# Patient Record
Sex: FEMALE | Race: BLACK OR AFRICAN AMERICAN | Employment: UNEMPLOYED
[De-identification: names, ages, dates, MRNs, and addresses within clinical notes are randomized per-mention and may not be internally consistent; named-entity substitution may affect disease eponyms.]

---

## 2017-01-06 ENCOUNTER — HOME CARE VISIT (OUTPATIENT)
Dept: SCHEDULING | Facility: HOME HEALTH | Age: 82
End: 2017-01-06
Payer: MEDICARE

## 2017-01-06 VITALS — OXYGEN SATURATION: 98 % | HEART RATE: 96 BPM | SYSTOLIC BLOOD PRESSURE: 138 MMHG | DIASTOLIC BLOOD PRESSURE: 72 MMHG

## 2017-01-06 PROCEDURE — G0151 HHCP-SERV OF PT,EA 15 MIN: HCPCS

## 2017-02-06 ENCOUNTER — HOSPITAL ENCOUNTER (OUTPATIENT)
Dept: LAB | Age: 82
Discharge: HOME | End: 2017-02-06
Payer: MEDICARE

## 2017-02-06 ENCOUNTER — OFFICE VISIT (OUTPATIENT)
Dept: INTERNAL MEDICINE CLINIC | Age: 82
End: 2017-02-06

## 2017-02-06 VITALS
HEART RATE: 98 BPM | OXYGEN SATURATION: 99 % | HEIGHT: 62 IN | DIASTOLIC BLOOD PRESSURE: 70 MMHG | RESPIRATION RATE: 16 BRPM | WEIGHT: 190.2 LBS | BODY MASS INDEX: 35 KG/M2 | SYSTOLIC BLOOD PRESSURE: 146 MMHG | TEMPERATURE: 97.4 F

## 2017-02-06 DIAGNOSIS — R04.0 EPISTAXIS, RECURRENT: ICD-10-CM

## 2017-02-06 DIAGNOSIS — R60.9 EDEMA, UNSPECIFIED TYPE: ICD-10-CM

## 2017-02-06 DIAGNOSIS — R10.30 LOWER ABDOMINAL PAIN: ICD-10-CM

## 2017-02-06 DIAGNOSIS — R04.0 EPISTAXIS, RECURRENT: Primary | ICD-10-CM

## 2017-02-06 LAB
ANION GAP BLD CALC-SCNC: 7 MMOL/L (ref 3–18)
APPEARANCE UR: ABNORMAL
BACTERIA URNS QL MICRO: ABNORMAL /HPF
BILIRUB UR QL: ABNORMAL
BUN SERPL-MCNC: 30 MG/DL (ref 7–18)
BUN/CREAT SERPL: 16 (ref 12–20)
CALCIUM SERPL-MCNC: 9.3 MG/DL (ref 8.5–10.1)
CHLORIDE SERPL-SCNC: 108 MMOL/L (ref 100–108)
CO2 SERPL-SCNC: 27 MMOL/L (ref 21–32)
COLOR UR: ABNORMAL
CREAT SERPL-MCNC: 1.82 MG/DL (ref 0.6–1.3)
EPITH CASTS URNS QL MICRO: ABNORMAL /LPF (ref 0–5)
ERYTHROCYTE [DISTWIDTH] IN BLOOD BY AUTOMATED COUNT: 13.7 % (ref 11.6–14.5)
GLUCOSE SERPL-MCNC: 86 MG/DL (ref 74–99)
GLUCOSE UR STRIP.AUTO-MCNC: NEGATIVE MG/DL
HCT VFR BLD AUTO: 35 % (ref 35–45)
HGB BLD-MCNC: 11.2 G/DL (ref 12–16)
HGB UR QL STRIP: ABNORMAL
KETONES UR QL STRIP.AUTO: ABNORMAL MG/DL
LEUKOCYTE ESTERASE UR QL STRIP.AUTO: ABNORMAL
MCH RBC QN AUTO: 31.6 PG (ref 24–34)
MCHC RBC AUTO-ENTMCNC: 32 G/DL (ref 31–37)
MCV RBC AUTO: 98.9 FL (ref 74–97)
NITRITE UR QL STRIP.AUTO: NEGATIVE
PH UR STRIP: 5.5 [PH] (ref 5–8)
PLATELET # BLD AUTO: 108 K/UL (ref 135–420)
PMV BLD AUTO: 11.9 FL (ref 9.2–11.8)
POTASSIUM SERPL-SCNC: 4.1 MMOL/L (ref 3.5–5.5)
PROT UR STRIP-MCNC: 30 MG/DL
RBC # BLD AUTO: 3.54 M/UL (ref 4.2–5.3)
RBC #/AREA URNS HPF: ABNORMAL /HPF (ref 0–5)
SODIUM SERPL-SCNC: 142 MMOL/L (ref 136–145)
SP GR UR REFRACTOMETRY: 1.02 (ref 1–1.03)
UROBILINOGEN UR QL STRIP.AUTO: 1 EU/DL (ref 0.2–1)
WBC # BLD AUTO: 4.4 K/UL (ref 4.6–13.2)
WBC URNS QL MICRO: ABNORMAL /HPF (ref 0–4)

## 2017-02-06 PROCEDURE — 85027 COMPLETE CBC AUTOMATED: CPT | Performed by: INTERNAL MEDICINE

## 2017-02-06 PROCEDURE — 81001 URINALYSIS AUTO W/SCOPE: CPT | Performed by: INTERNAL MEDICINE

## 2017-02-06 PROCEDURE — 80048 BASIC METABOLIC PNL TOTAL CA: CPT | Performed by: INTERNAL MEDICINE

## 2017-02-06 PROCEDURE — 36415 COLL VENOUS BLD VENIPUNCTURE: CPT | Performed by: INTERNAL MEDICINE

## 2017-02-06 NOTE — PATIENT INSTRUCTIONS
Nosebleeds: Care Instructions  Your Care Instructions    Nosebleeds are common, especially if you have colds or allergies. Many things can cause a nosebleed. Some nosebleeds stop on their own with pressure. Others need packing. Some get cauterized (sealed). If you have gauze or other packing materials in your nose, you will need to follow up with your doctor to have the packing removed. You may need more treatment if you get nosebleeds a lot. The doctor has checked you carefully, but problems can develop later. If you notice any problems or new symptoms, get medical treatment right away. Follow-up care is a key part of your treatment and safety. Be sure to make and go to all appointments, and call your doctor if you are having problems. It's also a good idea to know your test results and keep a list of the medicines you take. How can you care for yourself at home? · If you get another nosebleed:  ¨ Sit up and tilt your head slightly forward. This keeps blood from going down your throat. ¨ Use your thumb and index finger to pinch your nose shut for 10 minutes. Use a clock. Do not check to see if the bleeding has stopped before the 10 minutes are up. If the bleeding has not stopped, pinch your nose shut for another 10 minutes. ¨ When the bleeding has stopped, try not to pick, rub, or blow your nose for 12 hours. Avoiding these things helps keep your nose from bleeding again. · If your doctor prescribed antibiotics, take them as directed. Do not stop taking them just because you feel better. You need to take the full course of antibiotics. To prevent nosebleeds  · Do not blow your nose too hard. · Try not to lift or strain after a nosebleed. · Raise your head on a pillow while you sleep. · Put a thin layer of a saline- or water-based nasal gel, such as NasoGel, inside your nose. Put it on the septum, which divides your nostrils. This will prevent dryness that can cause nosebleeds.   · Use a vaporizer or humidifier to add moisture to your bedroom. Follow the directions for cleaning the machine. · Do not use aspirin, ibuprofen (Advil, Motrin), or naproxen (Aleve) for 36 to 48 hours after a nosebleed unless your doctor tells you to. You can use acetaminophen (Tylenol) for pain relief. · Talk to your doctor about stopping any other medicines you are taking. Some medicines may make you more likely to get a nosebleed. · Do not use cold medicines or nasal sprays without first talking to your doctor. They can make your nose dry. When should you call for help? Call 911 anytime you think you may need emergency care. For example, call if:  · You passed out (lost consciousness). Call your doctor now or seek immediate medical care if:  · You get another nosebleed and your nose is still bleeding after you have applied pressure 3 times for 10 minutes each time (30 minutes total). · There is a lot of blood running down the back of your throat even after you pinch your nose and tilt your head forward. · You have a fever. · You have sinus pain. Watch closely for changes in your health, and be sure to contact your doctor if:  · You get nosebleeds often, even if they stop. · You do not get better as expected. Where can you learn more? Go to http://quirino-zeinab.info/. Enter S156 in the search box to learn more about \"Nosebleeds: Care Instructions. \"  Current as of: May 27, 2016  Content Version: 11.1  © 7286-2766 [employer]. Care instructions adapted under license by [employer] (which disclaims liability or warranty for this information). If you have questions about a medical condition or this instruction, always ask your healthcare professional. [address] any warranty or liability for your use of this information.

## 2017-02-06 NOTE — PROGRESS NOTES
Patient presents today for FU diabetes. Patient  presents today with C/O LLQ abdominal pain, patient also reports having nose bleeds 5 times last week. Pt preferred language for healthcare discussion is english. Do you have an advanced directive? No  Is someone accompanying this pt? If so who? Yes her daughter in law   Is the patient using any DME equipment during [address]? Yes What equipment? 1. Have you been to the ER, urgent care clinic since your last visit? Hospitalized since your last visit?  No

## 2017-02-06 NOTE — PROGRESS NOTES
HISTORY OF PRESENT ILLNESS  Keyur Carlson is a 80 y.o. female. HPI Comments: 79 yo female with c/o recent increased nose bleeds, abdominal discomfort, diarrhea. Epistaxis - lasted up to 4 hours intermittent last week. Usually only 15-30 minutes. Typically stops if she packs with tissue. She does have low plts, likely related to her chemo agent. Is not using nasal spray currently. She notes diarrhea is also a chronic intermittent issue since being on chemo. No melena, BRBPR. She does note some lower abdomen discomfort. Sx increased with urinating. No fevers, chills though she does keep her home warm (80s). Edema has improved off Norvasc. Breathing better with recent PT instructions, energy conservation, breathing techniques. Review of Systems   Constitutional: Negative for chills, fever and weight loss. HENT: Positive for nosebleeds. Negative for congestion and sore throat. Eyes: Negative for blurred vision and pain. Respiratory: Positive for shortness of breath (chronic). Negative for cough and hemoptysis. Cardiovascular: Positive for leg swelling. Negative for chest pain and palpitations. Gastrointestinal: Positive for diarrhea. Negative for blood in stool, heartburn, melena, nausea and vomiting. Genitourinary: Positive for dysuria. Negative for frequency, hematuria and urgency. Musculoskeletal: Negative for falls. Skin: Negative for itching. Neurological: Negative for dizziness, tingling and headaches. Psychiatric/Behavioral: Negative for depression. The patient is not nervous/anxious. Past Medical History   Diagnosis Date    [facility] ([facility]     Diabetes ([facility]     GERD (gastroesophageal reflux disease)     Glaucoma     Hypercholesterolemia     Hypertension     [facility]      on tarciva     Current Outpatient Prescriptions on File Prior to Visit   Medication Sig Dispense Refill    TAGRISSO 80 mg tablet Take 80 mg by mouth daily.       prazosin (MINIPRESS) 1 mg capsule 1tid  Indications: HYPERTENSION 180 Cap 5    OMEGA-3 FATTY ACIDS/FISH OIL (OMEGA 3 FISH OIL PO) Take  by mouth.  omeprazole (PRILOSEC) 20 mg capsule take 1 capsule by mouth once daily 90 Cap 5    ipratropium (ATROVENT) 0.03 % nasal spray 2 Sprays by Both Nostrils route three (3) times daily.  ASPIRIN (ASPIR-81 PO) Take 81 mg by mouth daily.  BD INSULIN PEN NEEDLE UF SHORT 31 gauge x 5/16\" ndle USE AS DIRECTED TWICE DAILY 200 Pen Needle 5    insulin glargine (LANTUS SOLOSTAR) 100 unit/mL (3 mL) pen Use as directed based on endocrinology recommendations 1 Each 5    glucose blood VI test strips (ONETOUCH ULTRA TEST) strip Use as directed to test blood sugar 100 Strip 5    brinzolamide-brimonidine (SIMBRINZA) 1-0.2 % drps Apply 1 Drop to eye two (2) times a day. patient reports use of eye drops in both eyes      HUMALOG KWIKPEN 100 unit/mL kwikpen INJECT 6 UNITS BEFORE DINNER INCREASE TO 8 UNITS IF SUGAR IS OVER 200 15 mL 11    cyanocobalamin (VITAMIN B12) 500 mcg tablet Take 1,000 mcg by mouth daily.  CYCLOSPORINE (RESTASIS OP) Apply 1 Drop to eye two (2) times a day.  FERROUS SULFATE by Does Not Apply route.  CALCIUM CARB/VIT D2/MINERALS (CALTRATE PLUS PO) Take  by mouth.  erlotinib (TARCEVA) 100 mg tablet Take 150 mg by mouth daily. No current facility-administered medications on file prior to visit. Visit Vitals    /70 (BP 1 Location: Left arm, BP Patient Position: Sitting)    Pulse 98    Temp 97.4 °F (36.3 °C) (Oral)    Resp 16    Ht 5' 2\" (1.575 m)    Wt 190 lb 3.2 oz (86.3 kg)    SpO2 99%    BMI 34.79 kg/m2     Physical Exam   Constitutional: She appears well-developed and well-nourished. No distress. /70 (BP 1 Location: Left arm, BP Patient Position: Sitting)  Pulse 98  Temp 97.4 °F (36.3 °C) (Oral)   Resp 16  Ht 5' 2\" (1.575 m)  Wt 190 lb 3.2 oz (86.3 kg)  SpO2 99%  BMI 34.79 kg/m2     HENT:   Nose: No rhinorrhea.  No epistaxis (though some excoriation at septum). Right sinus exhibits no maxillary sinus tenderness. Left sinus exhibits no maxillary sinus tenderness. Eyes: EOM are normal. Right eye exhibits no discharge. Left eye exhibits no discharge. No scleral icterus. Neck: Neck supple. Cardiovascular: Normal rate, regular rhythm and normal heart sounds. Exam reveals no gallop and no friction rub. No murmur heard. Pulmonary/Chest: Effort normal and breath sounds normal. No respiratory distress. She has no wheezes. She has no rales. Abdominal: Soft. Bowel sounds are normal. She exhibits no distension. Tenderness: LLQ, RLQ. There is no guarding. Musculoskeletal: She exhibits edema (trace BLE). She exhibits no tenderness. Lymphadenopathy:     She has no cervical adenopathy. Neurological: She is alert. Skin: Skin is warm and dry. Psychiatric: She has a normal mood and affect. [facility]: Hgb A1c 7.1 (11/28/16)     (11/28/16)    microalb negative (4/28/16)  ASSESSMENT and PLAN    ICD-10-CM ICD-9-CM    1. Epistaxis, recurrent R04.0 784.7 CBC W/O DIFF   2. Lower abdominal pain R10.30 789.09 URINALYSIS W/ RFLX MICROSCOPIC      METABOLIC PANEL, BASIC   3. Edema, unspecified type R60.9 782.3    Discussed precautions regarding nose bleeds. Can try a little vaseline to nares. Will repeat labs today given thrombocytopenia, likely related to her chemo. .   Check UA to evaluate for possible UTI. Continue current medication.

## 2017-02-07 ENCOUNTER — TELEPHONE (OUTPATIENT)
Dept: INTERNAL MEDICINE CLINIC | Age: 82
End: 2017-02-07

## 2017-02-07 RX ORDER — SULFAMETHOXAZOLE AND TRIMETHOPRIM 800; 160 MG/1; MG/1
1 TABLET ORAL 2 TIMES DAILY
Qty: 6 TAB | Refills: 0 | Status: SHIPPED | OUTPATIENT
Start: 2017-02-07 | End: 2017-02-10

## 2017-02-07 NOTE — TELEPHONE ENCOUNTER
----- Message from Sydney Live MD sent at 2/7/2017  7:30 AM EST -----  Please let her know that there has been some decline in her kidney function. She should let her oncologist know that her creatinine is now 1.82 (GFR 32). She also has evidence of a UTI. I have ordered Bactrim for her.

## 2017-02-07 NOTE — TELEPHONE ENCOUNTER
Informed patient of result note below, informed patient that Bactrim was sent to pharmacy on file, patient verbalized understanding and agrees with plan.

## 2017-02-07 NOTE — PROGRESS NOTES
Please let her know that there has been some decline in her kidney function. She should let her oncologist know that her creatinine is now 1.82 (GFR 32). She also has evidence of a UTI. I have ordered Bactrim for her.

## 2017-03-30 ENCOUNTER — OFFICE VISIT (OUTPATIENT)
Dept: INTERNAL MEDICINE CLINIC | Age: 82
End: 2017-03-30

## 2017-03-30 VITALS
TEMPERATURE: 98.1 F | WEIGHT: 186.8 LBS | SYSTOLIC BLOOD PRESSURE: 93 MMHG | HEART RATE: 98 BPM | BODY MASS INDEX: 34.37 KG/M2 | RESPIRATION RATE: 32 BRPM | DIASTOLIC BLOOD PRESSURE: 52 MMHG | HEIGHT: 62 IN | OXYGEN SATURATION: 95 %

## 2017-03-30 DIAGNOSIS — I95.1 ORTHOSTATIC HYPOTENSION: Primary | ICD-10-CM

## 2017-03-30 NOTE — PROGRESS NOTES
HISTORY OF PRESENT ILLNESS  Shameka Campbell is a 80 y.o. female. HPI Comments: 81 yo female here initially for preop evaluation for urologic procedure, but reports increased SOB, lightheadedness. BP low on arrival. Orthostatics done and SBP dropped from 110 supine to 76 with standing with associated sx of dyspnea, lightheadedness. Denies CP. Glc this morning at home > 400. Son notes decreased PO intake. Neck Pain   Associated symptoms include shortness of breath. Pertinent negatives include no chest pain and no headaches. Arm Pain    Associated symptoms include neck pain. Pertinent negatives include no tingling. Review of Systems   Constitutional: Negative for chills and fever. HENT: Negative for congestion. Eyes: Negative for blurred vision and pain. Respiratory: Positive for shortness of breath. Negative for cough. Cardiovascular: Negative for chest pain, palpitations and leg swelling. Gastrointestinal: Negative for nausea and vomiting. Genitourinary: Negative for frequency and urgency. Musculoskeletal: Positive for neck pain. Negative for joint pain and myalgias. Neurological: Positive for dizziness. Negative for tingling and headaches. Psychiatric/Behavioral: Negative for depression. The patient is not nervous/anxious. Past Medical History:   Diagnosis Date    Cancer ([facility]     Diabetes ([facility]     GERD (gastroesophageal reflux disease)     Glaucoma     Hypercholesterolemia     Hypertension     Lung cancer ([facility]     on tarciva     Current Outpatient Prescriptions on File Prior to Visit   Medication Sig Dispense Refill    atorvastatin (LIPITOR) 20 mg tablet       RESTASIS 0.05 % ophthalmic emulsion instill 1 drop into both eyes twice a day  0    aspirin 81 mg chewable tablet 81 mg.      omega 3-dha-epa-fish oil (FISH OIL) 60- mg cap 500 mg.      insulin glargine (LANTUS) 100 unit/mL injection 12 Units.  prazosin (MINIPRESS) 1 mg capsule 1 mg.       MULTIVIT-MIN/IRON/FOLIC/LUTEIN (CENTRUM SILVER WOMEN PO) Take  by mouth.  omeprazole (PRILOSEC) 20 mg capsule take 1 capsule by mouth once daily 90 Cap 5    BD INSULIN PEN NEEDLE UF SHORT 31 gauge x 5/16\" ndle USE AS DIRECTED TWICE DAILY 200 Pen Needle 5    TAGRISSO 80 mg tablet Take 80 mg by mouth daily.  insulin glargine (LANTUS SOLOSTAR) 100 unit/mL (3 mL) pen Use as directed based on endocrinology recommendations 1 Each 5    glucose blood VI test strips (ONETOUCH ULTRA TEST) strip Use as directed to test blood sugar 100 Strip 5    prazosin (MINIPRESS) 1 mg capsule 1tid  Indications: HYPERTENSION 180 Cap 5    brinzolamide-brimonidine (SIMBRINZA) 1-0.2 % drps Apply 1 Drop to eye two (2) times a day. patient reports use of eye drops in both eyes      HUMALOG KWIKPEN 100 unit/mL kwikpen INJECT 6 UNITS BEFORE DINNER INCREASE TO 8 UNITS IF SUGAR IS OVER 200 15 mL 11    cyanocobalamin (VITAMIN B12) 500 mcg tablet Take 1,000 mcg by mouth daily.  FERROUS SULFATE by Does Not Apply route.  CALCIUM CARB/VIT D2/MINERALS (CALTRATE PLUS PO) Take  by mouth.  amLODIPine (NORVASC) 5 mg tablet       ipratropium (ATROVENT) 0.03 % nasal spray 2 Sprays by Both Nostrils route three (3) times daily. No current facility-administered medications on file prior to visit. Social History   Substance Use Topics    Smoking status: Never Smoker    Smokeless tobacco: Never Used    Alcohol use No       Physical Exam   Constitutional: She appears well-developed and well-nourished. Appears tired  BP 93/52 (BP 1 Location: Left arm, BP Patient Position: Sitting)  Pulse 98  Temp 98.1 °F (36.7 °C) (Oral)   Resp (!) 32  Ht 5' 2\" (1.575 m)  Wt 186 lb 12.8 oz (84.7 kg)  SpO2 95%  BMI 34.17 kg/m2     Eyes: EOM are normal. Right eye exhibits no discharge. Left eye exhibits no discharge. No scleral icterus. Cardiovascular: Normal rate, regular rhythm and normal heart sounds.   Exam reveals no gallop and no friction rub. No murmur heard. Pulmonary/Chest: Effort normal and breath sounds normal. No respiratory distress. She has no wheezes. She has no rales. Musculoskeletal: She exhibits no edema or tenderness. Neurological: She is alert. Skin: Skin is warm and dry. Psychiatric: She has a normal mood and affect. Lab Results   Component Value Date/Time    Sodium 142 02/06/2017 10:16 AM    Potassium 4.1 02/06/2017 10:16 AM    Chloride 108 02/06/2017 10:16 AM    CO2 27 02/06/2017 10:16 AM    Anion gap 7 02/06/2017 10:16 AM    Glucose 86 02/06/2017 10:16 AM    BUN 30 02/06/2017 10:16 AM    Creatinine 1.82 02/06/2017 10:16 AM    BUN/Creatinine ratio 16 02/06/2017 10:16 AM    GFR est AA 32 02/06/2017 10:16 AM    GFR est non-AA 26 02/06/2017 10:16 AM    Calcium 9.3 02/06/2017 10:16 AM    Bilirubin, total 0.3 09/08/2014 10:29 AM    AST (SGOT) 30 09/08/2014 10:29 AM    Alk. phosphatase 83 09/08/2014 10:29 AM    Protein, total 6.3 09/08/2014 10:29 AM    Albumin 3.5 09/08/2014 10:29 AM    A-G Ratio 1.3 09/08/2014 10:29 AM    ALT (SGPT) 24 09/08/2014 10:29 AM       ASSESSMENT and PLAN    ICD-10-CM ICD-9-CM    1. Orthostatic hypotension I95.1 458.0    Transferred to hospital for further assessment given sx, drop in BP, hyperglycemia.

## 2017-03-30 NOTE — PROGRESS NOTES
Patient presents today with C/O Right arm pain and numbness, neck pain, SOB, patient report symptoms started  two days ago. Patient son reports, patient BS was 461 this morning  at 8:37am  Pt preferred language for healthcare discussion is english. Do you have an advanced directive? Yes  Is someone accompanying this pt? If so who? Yes her son  Is the patient using any DME equipment during [address]? Yes What equipment? A walker  1. Have you been to the ER, urgent care clinic since your last visit?   Hospitalized since your last visit?no

## 2017-04-06 ENCOUNTER — PATIENT OUTREACH (OUTPATIENT)
Dept: INTERNAL MEDICINE CLINIC | Age: 82
End: 2017-04-06

## 2017-04-06 RX ORDER — GABAPENTIN 100 MG/1
100 CAPSULE ORAL 2 TIMES DAILY
COMMUNITY
End: 2017-05-22

## 2017-04-06 RX ORDER — BRINZOLAMIDE 10 MG/ML
1 SUSPENSION/ DROPS OPHTHALMIC 2 TIMES DAILY
COMMUNITY
End: 2017-05-22

## 2017-04-06 RX ORDER — OXYCODONE AND ACETAMINOPHEN 5; 325 MG/1; MG/1
1 TABLET ORAL
COMMUNITY
End: 2017-05-22

## 2017-04-12 ENCOUNTER — DOCUMENTATION ONLY (OUTPATIENT)
Dept: INTERNAL MEDICINE CLINIC | Age: 82
End: 2017-04-12

## 2017-04-12 ENCOUNTER — OFFICE VISIT (OUTPATIENT)
Dept: INTERNAL MEDICINE CLINIC | Age: 82
End: 2017-04-12

## 2017-04-12 VITALS
TEMPERATURE: 98.6 F | WEIGHT: 186 LBS | RESPIRATION RATE: 20 BRPM | BODY MASS INDEX: 34.23 KG/M2 | OXYGEN SATURATION: 92 % | DIASTOLIC BLOOD PRESSURE: 65 MMHG | HEIGHT: 62 IN | SYSTOLIC BLOOD PRESSURE: 121 MMHG | HEART RATE: 87 BPM

## 2017-04-12 DIAGNOSIS — I95.1 ORTHOSTATIC HYPOTENSION: Primary | ICD-10-CM

## 2017-04-12 DIAGNOSIS — C34.90 MALIGNANT NEOPLASM OF LUNG, UNSPECIFIED LATERALITY, UNSPECIFIED PART OF LUNG (HCC): ICD-10-CM

## 2017-04-12 DIAGNOSIS — R05.9 COUGH: ICD-10-CM

## 2017-04-12 RX ORDER — LEVOFLOXACIN 750 MG/1
TABLET ORAL
Qty: 4 TAB | Refills: 0 | Status: SHIPPED | OUTPATIENT
Start: 2017-04-12 | End: 2017-05-22

## 2017-04-12 NOTE — PROGRESS NOTES
Chief Complaint   Patient presents with   [facility] Follow Up     Pt states that she caught a cold while in [facility], and has had difficulty getting rid of the sxs. Pt son states that pt was placed on O2 in hospital due to pt destating on exersion prior to discharge       Pt preferred language for health care discussion is english. Is someone accompanying this pt? son    Is the patient using any DME equipment during [address]? wheelchair    Depression Screening completed. Yes    Learning Assessment completed. Yes    Abuse Screening completed. Yes    Health Maintenance reviewed and discussed per provider. Yes    Pt is due for Bone Density, MWV, Diabetic eye exam, Zostavax, Pneumo-13 or Peumo-23, Tdap. Please order/place referral if appropriate. Advance Directive:  1. Do you have an advance directive in place? Patient Reply:no    2. If not, would you like material regarding how to put one in place? Patient Reply: No    Coordination of Care:  1. Have you been to the ER, urgent care clinic since your last visit? Hospitalized since your last visit? [facility]- [facility]    2. Have you seen or consulted any other health care providers outside of the [address] [place] [address] since your last visit? Include any pap smears or colon screening.  no

## 2017-04-12 NOTE — MR AVS SNAPSHOT
Visit Information Date & Time Provider Department Dept. Phone Encounter #  
 4/12/2017 10:30 AM Belen Hutton [place] [phone] [id number] Follow-up Instructions Return in about 3 months (around 7/12/2017), or if symptoms worsen or fail to improve. Your Appointments 5/22/2017  9:30 AM  
Office Visit with MD PEACE Krause [facility]) Appt Note: 3 months fu; [address]  & 3 months fu  
 [address]  
  
   
 [address] [facility] Upcoming Health Maintenance Date Due  
 FOOT EXAM Q1 3/9/1940 DTaP/Tdap/Td series (1 - Tdap) 3/9/1951 ZOSTER VACCINE AGE 60> 3/9/1990 OSTEOPOROSIS SCREENING (DEXA) 3/9/1995 Pneumococcal 65+ High/Highest Risk (2 of 2 - PCV13) 11/3/2013 MEDICARE YEARLY EXAM 1/8/2017 MICROALBUMIN Q1 4/28/2017 HEMOGLOBIN A1C Q6M 5/28/2017 LIPID PANEL Q1 11/28/2017 EYE EXAM RETINAL OR DILATED Q1 12/27/2017 GLAUCOMA SCREENING Q2Y 12/27/2018 Allergies as of 4/12/2017  Review Complete On: 4/12/2017 By: Belen Hutton MD  
  
 Severity Noted Reaction Type Reactions Egg  02/20/2014    Other (comments) Per Dr. Cecy Jerome note on 11/20/13: She cannot take the flu shot because of egg allergy Current Immunizations  Never Reviewed Name Date Pneumococcal Polysaccharide (PPSV-23) 11/3/2012 Not reviewed this visit You Were Diagnosed With   
  
 Codes Comments Cough    -  Primary ICD-10-CM: M61 ICD-9-CM: 786.2 Orthostatic hypotension     ICD-10-CM: I95.1 ICD-9-CM: 458.0 Malignant neoplasm of lung, unspecified laterality, unspecified part of lung ([facility]     ICD-10-CM: C34.90 ICD-9-CM: 162.9 Vitals BP Pulse Temp Resp Height(growth percentile) Weight(growth percentile) 121/65 87 98.6 °F (37 °C) (Oral) 20 5' 2\" (1.575 m) 186 lb (84.4 kg) SpO2 BMI OB Status Smoking Status 92% 34.02 kg/m2 Hysterectomy Never Smoker BMI and BSA Data Body Mass Index Body Surface Area 34.02 kg/m 2 1.92 m 2 Preferred Pharmacy Pharmacy Name Phone [facility], [address] [place] [address] [phone] Your Updated Medication List  
  
   
This list is accurate as of: 4/12/17 11:10 AM.  Always use your most recent med list.  
  
  
  
  
 Artifi. Tear (Hypromellose)-PF 0.3 % Drop Apply 1 Drop to eye daily as needed. aspirin 81 mg chewable tablet 81 mg.  
  
 atorvastatin 20 mg tablet Commonly known as:  LIPITOR  
  
 BD INSULIN PEN NEEDLE UF SHORT 31 gauge x 5/16\" Ndle Generic drug:  Insulin Needles (Disposable) USE AS DIRECTED TWICE DAILY  
  
 brinzolamide 1 % ophthalmic suspension Commonly known as:  AZOPT Administer 1 Drop to both eyes two (2) times a day. CALTRATE PLUS PO Take  by mouth. CENTRUM SILVER WOMEN PO Take  by mouth.  
  
 cyanocobalamin 500 mcg tablet Commonly known as:  VITAMIN B12 Take 1,000 mcg by mouth daily. FERROUS SULFATE  
by Does Not Apply route. FISH OIL 60- mg Cap Generic drug:  omega 3-dha-epa-fish oil 500 mg.  
  
 gabapentin 100 mg capsule Commonly known as:  NEURONTIN Take 100 mg by mouth two (2) times a day. glucose blood VI test strips strip Commonly known as:  ONETOUCH ULTRA TEST Use as directed to test blood sugar HumaLOG KwikPen 100 unit/mL kwikpen Generic drug:  insulin lispro INJECT 6 UNITS BEFORE DINNER INCREASE TO 8 UNITS IF SUGAR IS OVER 200  
  
 insulin glargine 100 unit/mL (3 mL) pen Commonly known as:  LANTUS SOLOSTAR Use as directed based on endocrinology recommendations  
  
 levoFLOXacin 750 mg tablet Commonly known as:  LEVAQUIN  
1 tab po q every other day for one week  
  
 omeprazole 20 mg capsule Commonly known as:  PRILOSEC  
 take 1 capsule by mouth once daily  
  
 oxyCODONE-acetaminophen 5-325 mg per tablet Commonly known as:  PERCOCET Take 1 Tab by mouth every six (6) hours as needed for Pain. Take for 7 days  
  
 prazosin 1 mg capsule Commonly known as:  MINIPRESS  
1 mg. RESTASIS 0.05 % ophthalmic emulsion Generic drug:  cycloSPORINE  
instill 1 drop into both eyes twice a day SIMBRINZA 1-0.2 % Drps Generic drug:  brinzolamide-brimonidine Apply 1 Drop to eye two (2) times a day. patient reports use of eye drops in both eyes TAGRISSO 80 mg tablet Generic drug:  osimertinib Take 80 mg by mouth daily. Prescriptions Sent to Pharmacy Refills  
 levoFLOXacin (LEVAQUIN) 750 mg tablet 0 Si tab po q every other day for one week Class: Normal  
 Pharmacy: [address] [place] [address] #: [phone] Follow-up Instructions Return in about 3 months (around 2017), or if symptoms worsen or fail to improve. Introducing [facility]! Dear Sheron Waldrop: 
Thank you for requesting a [employer] account. Our records indicate that you already have an active [employer] account. You can access your account anytime at [email or web address]. [email or web address] Did you know that you can access your hospital and ER discharge instructions at any time in [employer]? You can also review all of your test results from your hospital stay or ER visit. Additional Information If you have questions, please visit the Frequently Asked Questions section of the [employer] website at [email or web address] Remember, [employer] is NOT to be used for urgent needs. For medical emergencies, dial 911. Now available from your iPhone and Android! Please provide this summary of care documentation to your next provider. Your primary care clinician is listed as Daniel Pfeiffer  If you have any questions after today's visit, please call [phone].

## 2017-04-12 NOTE — PROGRESS NOTES
HISTORY OF PRESENT ILLNESS  José Miguel Donovan is a 80 y.o. female. HPI Comments: 79 yo female here for f/u for hospital stay after being transferred to ED from office visit due to hypotension, hyperglycemia. Dx with UTI. Treated with IV rocephin. Noted to have decreased pOx. Now with home O2. She uses this mainly with activity. Needed at rest while at visit today. Feels tired and has some shortness of breath, but overall feels better than she did at hospital discharge though has developed cough which is productive over the past week (admitted 3/30-4/3 at [facility]). She did have home PT/OT arranged, but she had difficulty tolerating this and it was stopped. Son is present with her and agrees that she has declined functionally and requires more assistance. 11 lb weight loss in the past 6 months (unable to be weighed today due to difficulty standing). Review of Systems   Constitutional: Positive for malaise/fatigue and weight loss. Negative for chills and fever. HENT: Negative for congestion. Eyes: Negative for blurred vision and pain. Respiratory: Positive for cough and shortness of breath. Negative for hemoptysis and wheezing. Cardiovascular: Positive for leg swelling. Negative for chest pain and palpitations. Gastrointestinal: Negative for nausea and vomiting. Musculoskeletal: Negative for falls and myalgias. Neurological: Positive for weakness. Negative for dizziness, tingling and headaches. Psychiatric/Behavioral: Negative for depression. The patient is not nervous/anxious. Past Medical History:   Diagnosis Date    [facility] ([facility]     [facility] ([email or web address]     GERD (gastroesophageal reflux disease)     Glaucoma     Hypercholesterolemia     Hypertension     Lung cancer ([email or web address]     on tarciva     Current Outpatient Prescriptions on File Prior to Visit   Medication Sig Dispense Refill    Artifi. Tear, Hypromellose,-PF 0.3 % drop Apply 1 Drop to eye daily as needed.       atorvastatin (LIPITOR) 20 mg tablet       RESTASIS 0.05 % ophthalmic emulsion instill 1 drop into both eyes twice a day  0    aspirin 81 mg chewable tablet 81 mg.      omega 3-dha-epa-fish oil (FISH OIL) 60- mg cap 500 mg.  prazosin (MINIPRESS) 1 mg capsule 1 mg.  MULTIVIT-MIN/IRON/FOLIC/LUTEIN (CENTRUM SILVER WOMEN PO) Take  by mouth.  omeprazole (PRILOSEC) 20 mg capsule take 1 capsule by mouth once daily 90 Cap 5    BD INSULIN PEN NEEDLE UF SHORT 31 gauge x 5/16\" ndle USE AS DIRECTED TWICE DAILY 200 Pen Needle 5    TAGRISSO 80 mg tablet Take 80 mg by mouth daily.  insulin glargine (LANTUS SOLOSTAR) 100 unit/mL (3 mL) pen Use as directed based on endocrinology recommendations 1 Each 5    glucose blood VI test strips (ONETOUCH ULTRA TEST) strip Use as directed to test blood sugar 100 Strip 5    brinzolamide-brimonidine (SIMBRINZA) 1-0.2 % drps Apply 1 Drop to eye two (2) times a day. patient reports use of eye drops in both eyes      HUMALOG KWIKPEN 100 unit/mL kwikpen INJECT 6 UNITS BEFORE DINNER INCREASE TO 8 UNITS IF SUGAR IS OVER 200 15 mL 11    cyanocobalamin (VITAMIN B12) 500 mcg tablet Take 1,000 mcg by mouth daily.  FERROUS SULFATE by Does Not Apply route.  CALCIUM CARB/VIT D2/MINERALS (CALTRATE PLUS PO) Take  by mouth.  brinzolamide (AZOPT) 1 % ophthalmic suspension Administer 1 Drop to both eyes two (2) times a day.  gabapentin (NEURONTIN) 100 mg capsule Take 100 mg by mouth two (2) times a day.  oxyCODONE-acetaminophen (PERCOCET) 5-325 mg per tablet Take 1 Tab by mouth every six (6) hours as needed for Pain. Take for 7 days       No current facility-administered medications on file prior to visit. Social History   Substance Use Topics    Smoking status: Never Smoker    Smokeless tobacco: Never Used    Alcohol use No     Physical Exam   Constitutional: She appears well-developed and well-nourished. No distress.    /65  Pulse 87  Temp 98.6 °F (37 °C) (Oral)   Resp 20  Ht 5' 2\" (1.575 m)  Wt 186 lb (84.4 kg) Comment: pt in wheel chair today  SpO2 92%  BMI 34.02 kg/m2     Eyes: EOM are normal. Right eye exhibits no discharge. Left eye exhibits no discharge. No scleral icterus. Neck: Neck supple. Cardiovascular: Normal rate, regular rhythm and normal heart sounds. Exam reveals no gallop and no friction rub. No murmur heard. Pulmonary/Chest: Effort normal. No respiratory distress. She has no wheezes. She has rales. Musculoskeletal: She exhibits edema (trace BLE). She exhibits no tenderness. Lymphadenopathy:     She has no cervical adenopathy. Neurological: She is alert. She exhibits normal muscle tone. Skin: Skin is warm and dry. Psychiatric: She has a normal mood and affect. Lab Results   Component Value Date/Time    Sodium 142 02/06/2017 10:16 AM    Potassium 4.1 02/06/2017 10:16 AM    Chloride 108 02/06/2017 10:16 AM    CO2 27 02/06/2017 10:16 AM    Anion gap 7 02/06/2017 10:16 AM    Glucose 86 02/06/2017 10:16 AM    BUN 30 02/06/2017 10:16 AM    Creatinine 1.82 02/06/2017 10:16 AM    BUN/Creatinine ratio 16 02/06/2017 10:16 AM    GFR est AA 32 02/06/2017 10:16 AM    GFR est non-AA 26 02/06/2017 10:16 AM    Calcium 9.3 02/06/2017 10:16 AM    Bilirubin, total 0.3 09/08/2014 10:29 AM    AST (SGOT) 30 09/08/2014 10:29 AM    Alk. phosphatase 83 09/08/2014 10:29 AM    Protein, total 6.3 09/08/2014 10:29 AM    Albumin 3.5 09/08/2014 10:29 AM    A-G Ratio 1.3 09/08/2014 10:29 AM    ALT (SGPT) 24 09/08/2014 10:29 AM     Lab Results   Component Value Date/Time    WBC 4.4 02/06/2017 10:16 AM    WBC 6.4 07/02/2012 01:54 PM    HGB 11.2 02/06/2017 10:16 AM    HCT 35.0 02/06/2017 10:16 AM    PLATELET 903 07/24/9626 10:16 AM    MCV 98.9 02/06/2017 10:16 AM     ASSESSMENT and PLAN    ICD-10-CM ICD-9-CM    1. Orthostatic hypotension I95.1 458.0    2. Cough R05 786.2    3.  Malignant [facility] of lung, unspecified laterality, [facility] C34.90 162.9 REFERRAL TO HOSPICE   Orthostatic sx have improved, though concern with possible hosp acquired pneumonia with rhonchi on exam , increased cough, decreased pOx. Will treat with course of levofloxacin. Discussed her overall functional decline over the past few months. She and her son are interested in hospice referral to see if she is a candidate and if this would help meet her care needs. Referral entered.

## 2017-04-17 ENCOUNTER — OFFICE VISIT (OUTPATIENT)
Dept: INTERNAL MEDICINE CLINIC | Age: 82
End: 2017-04-17

## 2017-04-17 ENCOUNTER — HOME HEALTH ADMISSION (OUTPATIENT)
Dept: HOME HEALTH SERVICES | Facility: HOME HEALTH | Age: 82
End: 2017-04-17
Payer: MEDICARE

## 2017-04-17 VITALS
RESPIRATION RATE: 20 BRPM | HEART RATE: 86 BPM | OXYGEN SATURATION: 96 % | SYSTOLIC BLOOD PRESSURE: 110 MMHG | HEIGHT: 62 IN | TEMPERATURE: 98.4 F | DIASTOLIC BLOOD PRESSURE: 49 MMHG

## 2017-04-17 DIAGNOSIS — R62.7 FAILURE TO THRIVE IN ADULT: ICD-10-CM

## 2017-04-17 DIAGNOSIS — R06.02 SHORTNESS OF BREATH: Primary | ICD-10-CM

## 2017-04-17 RX ORDER — FUROSEMIDE 20 MG/1
TABLET ORAL
Qty: 30 TAB | Refills: 1 | Status: SHIPPED | OUTPATIENT
Start: 2017-04-17 | End: 2017-06-10 | Stop reason: SDUPTHER

## 2017-04-17 NOTE — MR AVS SNAPSHOT
Visit Information Date & Time Provider Department Dept. Phone Encounter #  
 4/17/2017 11:00 AM Audrey Dunabr, [address] [phone] [id number] Follow-up Instructions Return if symptoms worsen or fail to improve. Your Appointments 5/22/2017  9:30 AM  
Office Visit with MD PEACE Miner [facility]) Appt Note: 3 months fu; [address]  & 3 months fu  
 [address]  
  
   
 [address] [facility] Upcoming Health Maintenance Date Due  
 FOOT EXAM Q1 3/9/1940 DTaP/Tdap/Td series (1 - Tdap) 3/9/1951 ZOSTER VACCINE AGE 60> 3/9/1990 OSTEOPOROSIS SCREENING (DEXA) 3/9/1995 Pneumococcal 65+ High/Highest Risk (2 of 2 - PCV13) 11/3/2013 MEDICARE YEARLY EXAM 1/8/2017 MICROALBUMIN Q1 4/28/2017 HEMOGLOBIN A1C Q6M 5/28/2017 LIPID PANEL Q1 11/28/2017 EYE EXAM RETINAL OR DILATED Q1 12/27/2017 GLAUCOMA SCREENING Q2Y 12/27/2018 Allergies as of 4/17/2017  Review Complete On: 4/17/2017 By: Audrey Dunbar MD  
  
 Severity Noted Reaction Type Reactions Egg  02/20/2014    Other (comments) Per Dr. Sebastian Pass note on 11/20/13: She cannot take the flu shot because of egg allergy Current Immunizations  Never Reviewed Name Date Pneumococcal Polysaccharide (PPSV-23) 11/3/2012 Not reviewed this visit You Were Diagnosed With   
  
 Codes Comments Shortness of breath    -  Primary ICD-10-CM: R06.02 
ICD-9-CM: 786.05 Failure to thrive in adult     ICD-10-CM: R62.7 ICD-9-CM: 572. 7 Vitals BP Pulse Temp Resp Height(growth percentile) SpO2  
 110/49 (BP 1 Location: Left arm, BP Patient Position: Sitting) 86 98.4 °F (36.9 °C) (Oral) 20 5' 2\" (1.575 m) 96% OB Status Smoking Status Hysterectomy Never Smoker Preferred Pharmacy Pharmacy Name Phone [facility] [facility], [address] [place] [address] [phone] Your Updated Medication List  
  
   
This list is accurate as of: 4/17/17 11:52 AM.  Always use your most recent med list.  
  
  
  
  
 Artifi. Tear (Hypromellose)-PF 0.3 % Drop Apply 1 Drop to eye daily as needed. aspirin 81 mg chewable tablet 81 mg.  
  
 atorvastatin 20 mg tablet Commonly known as:  LIPITOR  
  
 BD INSULIN PEN NEEDLE UF SHORT 31 gauge x 5/16\" Ndle Generic drug:  Insulin Needles (Disposable) USE AS DIRECTED TWICE DAILY  
  
 brinzolamide 1 % ophthalmic suspension Commonly known as:  AZOPT Administer 1 Drop to both eyes two (2) times a day. CALTRATE PLUS PO Take  by mouth. CENTRUM SILVER WOMEN PO Take  by mouth.  
  
 cyanocobalamin 500 mcg tablet Commonly known as:  VITAMIN B12 Take 1,000 mcg by mouth daily. FERROUS SULFATE  
by Does Not Apply route. FISH OIL 60- mg Cap Generic drug:  omega 3-dha-epa-fish oil 500 mg.  
  
 furosemide 20 mg tablet Commonly known as:  LASIX  
1 tab po q day for leg swelling  
  
 gabapentin 100 mg capsule Commonly known as:  NEURONTIN Take 100 mg by mouth two (2) times a day. glucose blood VI test strips strip Commonly known as:  ONETOUCH ULTRA TEST Use as directed to test blood sugar HumaLOG KwikPen 100 unit/mL kwikpen Generic drug:  insulin lispro INJECT 6 UNITS BEFORE DINNER INCREASE TO 8 UNITS IF SUGAR IS OVER 200  
  
 insulin glargine 100 unit/mL (3 mL) pen Commonly known as:  LANTUS SOLOSTAR Use as directed based on endocrinology recommendations  
  
 levoFLOXacin 750 mg tablet Commonly known as:  LEVAQUIN  
1 tab po q every other day for one week  
  
 omeprazole 20 mg capsule Commonly known as:  PRILOSEC  
take 1 capsule by mouth once daily  
  
 oxyCODONE-acetaminophen 5-325 mg per tablet Commonly known as:  PERCOCET  
 Take 1 Tab by mouth every six (6) hours as needed for Pain. Take for 7 days  
  
 prazosin 1 mg capsule Commonly known as:  MINIPRESS  
1 mg. RESTASIS 0.05 % ophthalmic emulsion Generic drug:  cycloSPORINE  
instill 1 drop into both eyes twice a day SIMBRINZA 1-0.2 % Drps Generic drug:  brinzolamide-brimonidine Apply 1 Drop to eye two (2) times a day. patient reports use of eye drops in both eyes TAGRISSO 80 mg tablet Generic drug:  osimertinib Take 80 mg by mouth daily. Prescriptions Sent to Pharmacy Refills  
 furosemide (LASIX) 20 mg tablet 1 Si tab po q day for leg swelling Class: Normal  
 Pharmacy: [address] [place] [address], [address] [place] [address] #: [phone] We Performed the Following [address] [place] [address] Comments:  
 Please evaluate patient for failure to thrive; need for personal care aide. Michael Ann Follow-up Instructions Return if symptoms worsen or fail to improve. To-Do List   
 Around 2017 Imaging:  XR CHEST PA LAT Referral Information Referral ID Referred By Referred To  
  
 [id number] Dameon MONTAGUE Not Available Visits Status Start Date End Date 1 New Request 17 If your referral has a status of pending review or denied, additional information will be sent to support the outcome of this decision. Patient Instructions Furosemide (By mouth) Furosemide (bomd-MR-wl-mide) Treats fluid retention and high blood pressure. This medicine is a diuretic (water pill). Brand Name(s): Active-Medicated Specimen Collection Kit, Lasix, RX-Specimen Collection Kit, Urinx Medicated Specimen Collection Package There may be other brand names for this medicine. When This Medicine Should Not Be Used: This medicine is not right for everyone.  Do not use it if you had an allergic reaction to furosemide or if you are not able to urinate. How to Use This Medicine:  
Liquid, Tablet · Take your medicine as directed. Your dose may need to be changed several times to find what works best for you. · Measure the oral liquid medicine with a marked measuring spoon, oral syringe, or medicine cup. · You may take this medicine with food if it upsets your stomach. · Missed dose: Take a dose as soon as you remember. If it is almost time for your next dose, wait until then and take a regular dose. Do not take extra medicine to make up for a missed dose. · Store the medicine in a closed container at room temperature, away from heat, moisture, and direct light. Drugs and Foods to Avoid: Ask your doctor or pharmacist before using any other medicine, including over-the-counter medicines, vitamins, and herbal products. · Some medicines can affect how furosemide works. Tell your doctor if you are also using cisplatin, cyclosporine, digoxin, ethacrynic acid, indomethacin, licorice, lithium, mecamylamine, methotrexate, or phenytoin. · Also tell your doctor if you are also using an adrenocorticotropic hormone (ACTH), a laxative, medicine to treat an infection, other medicine for high blood pressure, a steroid medicine (such as hydrocortisone, methylprednisolone, prednisone, prednisolone, dexamethasone), or an NSAID pain or arthritis medicine (such as aspirin, diclofenac, ibuprofen, naproxen). · If you also take sucralfate, allow at least 2 hours between the time you take furosemide and the time you take sucralfate. Warnings While Using This Medicine: · Tell your doctor if you are pregnant or breastfeeding, or if you have kidney disease, liver disease, anemia, diabetes, gout, hearing problems, low blood pressure, lupus, an enlarged prostate, trouble urinating, or an allergy to sulfa drugs. Tell your doctor if you drink alcohol. · This medicine may cause the following problems: ¨ Hypokalemia (low potassium in your blood) ¨ Changes in blood sugar levels ¨ Hearing problems · Make sure any doctor or dentist who treats you knows that you are using this medicine. · This medicine could lower your blood pressure too much, especially when you first use it or if you are dehydrated. Stand or sit up slowly if you feel lightheaded or dizzy. · Drink plenty of fluids if you exercise, sweat more than usual, or have diarrhea or vomiting. · This medicine may make your skin more sensitive to sunlight. Wear sunscreen. Do not use sunlamps or tanning beds. · Your doctor will do lab tests at regular visits to check on the effects of this medicine. Keep all appointments. · Keep all medicine out of the reach of children. Never share your medicine with anyone. Possible Side Effects While Using This Medicine:  
Call your doctor right away if you notice any of these side effects: · Allergic reaction: Itching or hives, swelling in your face or hands, swelling or tingling in your mouth or throat, chest tightness, trouble breathing · Blistering, peeling, red skin rash · Chest pain, shortness of breath · Confusion, weakness, muscle twitching · Dry mouth, increased thirst, muscle cramps, nausea or vomiting, uneven heartbeat · Sudden and severe stomach pain, nausea, vomiting, fever, lightheadedness · Hearing loss, ringing in the ears · Lightheadedness, dizziness, fainting · Severe diarrhea · Unusual bleeding or bruising · Yellow skin or eyes If you notice these less serious side effects, talk with your doctor: · Loss of appetite, stomach cramps If you notice other side effects that you think are caused by this medicine, tell your doctor. Call your doctor for medical advice about side effects. You may report side effects to FDA at 8-207-WFT-4822 © 2016 [address] is for End User's use only and may not be sold, redistributed or otherwise used for commercial purposes. The above information is an  only. It is not intended as medical advice for individual conditions or treatments. Talk to your doctor, nurse or pharmacist before following any medical regimen to see if it is safe and effective for you. Introducing [facility]! Dear Maria Victoria Solomon: 
Thank you for requesting a LoLo account. Our records indicate that you already have an active LoLo account. You can access your account anytime at [email or web address] Did you know that you can access your hospital and ER discharge instructions at any time in LoLo? You can also review all of your test results from your hospital stay or ER visit. Additional Information If you have questions, please visit the Frequently Asked Questions section of the [employer] website at [email or web address] Remember, [employer]Lo is NOT to be used for urgent needs. For medical emergencies, dial 911. Now available from your iPhone and Android! Please provide this summary of care documentation to your next provider. Your primary care clinician is listed as Daniel Villagomez. If you have any questions after today's visit, please call [phone].

## 2017-04-17 NOTE — PROGRESS NOTES
HISTORY OF PRESENT ILLNESS  Homero Groves is a 80 y.o. female. HPI Comments: 81 yo female here for f/u of cough, shortness of breath. She does not feel better since starting levofloxacin. Still productive. Some increase in LE edema. She remains weak and has difficulty with standing, self care. Son and daughter-in-law have been providing more IADL, ADL support. Hospice referral entered last week and they were impressed with this option, but it would likely mean that she would need to discontinue the medication for her lung cancer. They report her oncologist feels this medicine has stabilized this dz. Review of Systems   Constitutional: Negative for chills and fever. HENT: Negative for congestion. Eyes: Negative for blurred vision and pain. Respiratory: Positive for cough, sputum production and shortness of breath. Negative for hemoptysis. Cardiovascular: Positive for leg swelling. Negative for chest pain and palpitations. Gastrointestinal: Negative for nausea and vomiting. Musculoskeletal: Negative for falls. Neurological: Negative for dizziness, tingling and headaches. Psychiatric/Behavioral: Negative for depression. The patient is not nervous/anxious. Past Medical History:   Diagnosis Date    [facility]     Diabetes ([facility]     GERD (gastroesophageal reflux disease)     Glaucoma     Hypercholesterolemia     Hypertension     Lung cancer ([facility]     on tarciva     Current Outpatient Prescriptions on File Prior to Visit   Medication Sig Dispense Refill    levoFLOXacin (LEVAQUIN) 750 mg tablet 1 tab po q every other day for one week 4 Tab 0    Artifi. Tear, Hypromellose,-PF 0.3 % drop Apply 1 Drop to eye daily as needed.  brinzolamide (AZOPT) 1 % ophthalmic suspension Administer 1 Drop to both eyes two (2) times a day.  gabapentin (NEURONTIN) 100 mg capsule Take 100 mg by mouth two (2) times a day.       oxyCODONE-acetaminophen (PERCOCET) 5-325 mg per tablet Take 1 Tab by mouth every six (6) hours as needed for Pain. Take for 7 days      atorvastatin (LIPITOR) 20 mg tablet       RESTASIS 0.05 % ophthalmic emulsion instill 1 drop into both eyes twice a day  0    aspirin 81 mg chewable tablet 81 mg.      omega 3-dha-epa-fish oil (FISH OIL) 60- mg cap 500 mg.  prazosin (MINIPRESS) 1 mg capsule 1 mg.  MULTIVIT-MIN/IRON/FOLIC/LUTEIN (CENTRUM SILVER WOMEN PO) Take  by mouth.  omeprazole (PRILOSEC) 20 mg capsule take 1 capsule by mouth once daily 90 Cap 5    BD INSULIN PEN NEEDLE UF SHORT 31 gauge x 5/16\" ndle USE AS DIRECTED TWICE DAILY 200 Pen Needle 5    TAGRISSO 80 mg tablet Take 80 mg by mouth daily.  insulin glargine (LANTUS SOLOSTAR) 100 unit/mL (3 mL) pen Use as directed based on endocrinology recommendations 1 Each 5    glucose blood VI test strips (ONETOUCH ULTRA TEST) strip Use as directed to test blood sugar 100 Strip 5    brinzolamide-brimonidine (SIMBRINZA) 1-0.2 % drps Apply 1 Drop to eye two (2) times a day. patient reports use of eye drops in both eyes      HUMALOG KWIKPEN 100 unit/mL kwikpen INJECT 6 UNITS BEFORE DINNER INCREASE TO 8 UNITS IF SUGAR IS OVER 200 15 mL 11    cyanocobalamin (VITAMIN B12) 500 mcg tablet Take 1,000 mcg by mouth daily.  CALCIUM CARB/VIT D2/MINERALS (CALTRATE PLUS PO) Take  by mouth.  FERROUS SULFATE by Does Not Apply route. No current facility-administered medications on file prior to visit. Social History   Substance Use Topics    Smoking status: Never Smoker    Smokeless tobacco: Never Used    Alcohol use No     Physical Exam   Constitutional: She appears well-developed and well-nourished. /49 (BP 1 Location: Left arm, BP Patient Position: Sitting)  Pulse 86  Temp 98.4 °F (36.9 °C) (Oral)   Resp 20  Ht 5' 2\" (1.575 m)  SpO2 96%     Eyes: EOM are normal. Right eye exhibits no discharge. Left eye exhibits no discharge. No scleral icterus. Neck: Neck supple. Cardiovascular: Normal rate, regular rhythm and normal heart sounds. Exam reveals no gallop and no friction rub. No murmur heard. Pulmonary/Chest: Effort normal. No respiratory distress. She has no wheezes. She has rales. Musculoskeletal: She exhibits edema. She exhibits no tenderness. Lymphadenopathy:     She has no cervical adenopathy. Neurological: She is alert. She exhibits normal muscle tone. Skin: Skin is warm and dry. Psychiatric: She has a normal mood and affect. Lab Results   Component Value Date/Time    Sodium 142 02/06/2017 10:16 AM    Potassium 4.1 02/06/2017 10:16 AM    Chloride 108 02/06/2017 10:16 AM    CO2 27 02/06/2017 10:16 AM    Anion gap 7 02/06/2017 10:16 AM    Glucose 86 02/06/2017 10:16 AM    BUN 30 02/06/2017 10:16 AM    Creatinine 1.82 02/06/2017 10:16 AM    BUN/Creatinine ratio 16 02/06/2017 10:16 AM    GFR est AA 32 02/06/2017 10:16 AM    GFR est non-AA 26 02/06/2017 10:16 AM    Calcium 9.3 02/06/2017 10:16 AM    Bilirubin, total 0.3 09/08/2014 10:29 AM    AST (SGOT) 30 09/08/2014 10:29 AM    Alk. phosphatase 83 09/08/2014 10:29 AM    Protein, total 6.3 09/08/2014 10:29 AM    Albumin 3.5 09/08/2014 10:29 AM    A-G Ratio 1.3 09/08/2014 10:29 AM    ALT (SGPT) 24 09/08/2014 10:29 AM     ASSESSMENT and PLAN    ICD-10-CM ICD-9-CM    1. Shortness of breath R06.02 786.05 XR CHEST PA LAT   2. Failure to thrive in adult R62.7 783.7 REFERRAL TO HOME HEALTH   Complete course of abx for possible hosp aquired pneumonia. Will check CXR today. Can try lasix for edema, but cautiously due to CKD. Discussed her functional decline. She notes she wants comfort and assistance with care. Hospice referral entered previously, but this would mean discontinuation of her PO chemo for lung CA. She will think about hospice some more with her family. Will place referral for home care aide.  If this is not enough support, can further discuss hospice option with her oncologist to determine best course of action.

## 2017-04-17 NOTE — PROGRESS NOTES
Patient presents today with her son for F/U cough. Patient son reports cough is not getting any better  Pt preferred language for healthcare discussion is english. Do you have an advanced directive? No  Is someone accompanying this pt? If so who? Yes her son   Is the patient using any DME equipment during [address]? Yes What equipment?  A wheel chair

## 2017-04-17 NOTE — PATIENT INSTRUCTIONS
Furosemide (By mouth)   Furosemide (roxf-XK-gz-mide)  Treats fluid retention and high blood pressure. This medicine is a diuretic (water pill). Brand Name(s): Active-Medicated Specimen Collection Kit, Lasix, RX-Specimen Collection Kit, Urinx Medicated Specimen Collection Package   There may be other brand names for this medicine. When This Medicine Should Not Be Used: This medicine is not right for everyone. Do not use it if you had an allergic reaction to furosemide or if you are not able to urinate. How to Use This Medicine:   Liquid, Tablet  · Take your medicine as directed. Your dose may need to be changed several times to find what works best for you. · Measure the oral liquid medicine with a marked measuring spoon, oral syringe, or medicine cup. · You may take this medicine with food if it upsets your stomach. · Missed dose: Take a dose as soon as you remember. If it is almost time for your next dose, wait until then and take a regular dose. Do not take extra medicine to make up for a missed dose. · Store the medicine in a closed container at room temperature, away from heat, moisture, and direct light. Drugs and Foods to Avoid:   Ask your doctor or pharmacist before using any other medicine, including over-the-counter medicines, vitamins, and herbal products. · Some medicines can affect how furosemide works. Tell your doctor if you are also using cisplatin, cyclosporine, digoxin, ethacrynic acid, indomethacin, licorice, lithium, mecamylamine, methotrexate, or phenytoin. · Also tell your doctor if you are also using an adrenocorticotropic hormone (ACTH), a laxative, medicine to treat an infection, other medicine for high blood pressure, a steroid medicine (such as hydrocortisone, methylprednisolone, prednisone, prednisolone, dexamethasone), or an NSAID pain or arthritis medicine (such as aspirin, diclofenac, ibuprofen, naproxen).   · If you also take sucralfate, allow at least 2 hours between the time you take furosemide and the time you take sucralfate. Warnings While Using This Medicine:   · Tell your doctor if you are pregnant or breastfeeding, or if you have kidney disease, liver disease, anemia, diabetes, gout, hearing problems, low blood pressure, lupus, an enlarged prostate, trouble urinating, or an allergy to sulfa drugs. Tell your doctor if you drink alcohol. · This medicine may cause the following problems:   ¨ Hypokalemia (low potassium in your blood)  ¨ Changes in blood sugar levels  ¨ Hearing problems  · Make sure any doctor or dentist who treats you knows that you are using this medicine. · This medicine could lower your blood pressure too much, especially when you first use it or if you are dehydrated. Stand or sit up slowly if you feel lightheaded or dizzy. · Drink plenty of fluids if you exercise, sweat more than usual, or have diarrhea or vomiting. · This medicine may make your skin more sensitive to sunlight. Wear sunscreen. Do not use sunlamps or tanning beds. · Your doctor will do lab tests at regular visits to check on the effects of this medicine. Keep all appointments. · Keep all medicine out of the reach of children. Never share your medicine with anyone.   Possible Side Effects While Using This Medicine:   Call your doctor right away if you notice any of these side effects:  · Allergic reaction: Itching or hives, swelling in your face or hands, swelling or tingling in your mouth or throat, chest tightness, trouble breathing  · Blistering, peeling, red skin rash  · Chest pain, shortness of breath  · Confusion, weakness, muscle twitching  · Dry mouth, increased thirst, muscle cramps, nausea or vomiting, uneven heartbeat  · Sudden and severe stomach pain, nausea, vomiting, fever, lightheadedness  · Hearing loss, ringing in the ears  · Lightheadedness, dizziness, fainting  · Severe diarrhea  · Unusual bleeding or bruising  · Yellow skin or eyes  If you notice these less serious side effects, talk with your doctor:   · Loss of appetite, stomach cramps  If you notice other side effects that you think are caused by this medicine, tell your doctor. Call your doctor for medical advice about side effects. You may report side effects to FDA at 6-641-HZE-5598  © 2016 [address] is for End User's use only and may not be sold, redistributed or otherwise used for commercial purposes. The above information is an  only. It is not intended as medical advice for individual conditions or treatments. Talk to your doctor, nurse or pharmacist before following any medical regimen to see if it is safe and effective for you.

## 2017-04-18 ENCOUNTER — TELEPHONE (OUTPATIENT)
Dept: INTERNAL MEDICINE CLINIC | Age: 82
End: 2017-04-18

## 2017-04-18 NOTE — TELEPHONE ENCOUNTER
Attempted to contact pt at  number, no answer. m for pt to return call to office at [phone]. Will continue to try to contact pt.

## 2017-04-18 NOTE — TELEPHONE ENCOUNTER
----- Message from Zbigniew Mohr MD sent at 4/17/2017  8:21 PM EDT -----  Please let her know her cxr did not show acute changes.

## 2017-04-19 ENCOUNTER — HOME CARE VISIT (OUTPATIENT)
Dept: SCHEDULING | Facility: HOME HEALTH | Age: 82
End: 2017-04-19
Payer: MEDICARE

## 2017-04-19 VITALS
DIASTOLIC BLOOD PRESSURE: 72 MMHG | TEMPERATURE: 96.5 F | HEART RATE: 85 BPM | OXYGEN SATURATION: 98 % | RESPIRATION RATE: 16 BRPM | SYSTOLIC BLOOD PRESSURE: 152 MMHG

## 2017-04-19 PROCEDURE — G0299 HHS/HOSPICE OF RN EA 15 MIN: HCPCS

## 2017-04-19 PROCEDURE — 3331090002 HH PPS REVENUE DEBIT

## 2017-04-19 PROCEDURE — 3331090001 HH PPS REVENUE CREDIT

## 2017-04-19 PROCEDURE — 400013 HH SOC

## 2017-04-19 NOTE — TELEPHONE ENCOUNTER
Pt contacted at home number. Spoke with pt son Bunny Cheney. 2 pt identifiers confirmed. Pt son informed of below. Pt son verbalized understanding. No other questions at this time.

## 2017-04-20 ENCOUNTER — HOME CARE VISIT (OUTPATIENT)
Dept: HOME HEALTH SERVICES | Facility: HOME HEALTH | Age: 82
End: 2017-04-20
Payer: MEDICARE

## 2017-04-20 DIAGNOSIS — R62.7 FAILURE TO THRIVE IN ADULT: Primary | ICD-10-CM

## 2017-04-20 PROCEDURE — 3331090002 HH PPS REVENUE DEBIT

## 2017-04-20 PROCEDURE — 3331090001 HH PPS REVENUE CREDIT

## 2017-04-21 ENCOUNTER — HOME CARE VISIT (OUTPATIENT)
Dept: SCHEDULING | Facility: HOME HEALTH | Age: 82
End: 2017-04-21
Payer: MEDICARE

## 2017-04-21 VITALS
DIASTOLIC BLOOD PRESSURE: 58 MMHG | OXYGEN SATURATION: 96 % | HEART RATE: 89 BPM | RESPIRATION RATE: 22 BRPM | SYSTOLIC BLOOD PRESSURE: 118 MMHG | TEMPERATURE: 97.9 F

## 2017-04-21 PROCEDURE — 3331090002 HH PPS REVENUE DEBIT

## 2017-04-21 PROCEDURE — G0299 HHS/HOSPICE OF RN EA 15 MIN: HCPCS

## 2017-04-21 PROCEDURE — 3331090001 HH PPS REVENUE CREDIT

## 2017-04-22 ENCOUNTER — HOME CARE VISIT (OUTPATIENT)
Dept: SCHEDULING | Facility: HOME HEALTH | Age: 82
End: 2017-04-22
Payer: MEDICARE

## 2017-04-22 PROCEDURE — G0156 HHCP-SVS OF AIDE,EA 15 MIN: HCPCS

## 2017-04-22 PROCEDURE — 3331090002 HH PPS REVENUE DEBIT

## 2017-04-22 PROCEDURE — 3331090001 HH PPS REVENUE CREDIT

## 2017-04-23 PROCEDURE — 3331090002 HH PPS REVENUE DEBIT

## 2017-04-23 PROCEDURE — 3331090001 HH PPS REVENUE CREDIT

## 2017-04-24 ENCOUNTER — TELEPHONE (OUTPATIENT)
Dept: INTERNAL MEDICINE CLINIC | Age: 82
End: 2017-04-24

## 2017-04-24 ENCOUNTER — HOME CARE VISIT (OUTPATIENT)
Dept: SCHEDULING | Facility: HOME HEALTH | Age: 82
End: 2017-04-24
Payer: MEDICARE

## 2017-04-24 VITALS
TEMPERATURE: 97.9 F | DIASTOLIC BLOOD PRESSURE: 60 MMHG | SYSTOLIC BLOOD PRESSURE: 106 MMHG | HEART RATE: 85 BPM | OXYGEN SATURATION: 99 %

## 2017-04-24 DIAGNOSIS — R62.7 FAILURE TO THRIVE IN ADULT: Primary | ICD-10-CM

## 2017-04-24 PROCEDURE — 3331090001 HH PPS REVENUE CREDIT

## 2017-04-24 PROCEDURE — 3331090002 HH PPS REVENUE DEBIT

## 2017-04-24 PROCEDURE — G0152 HHCP-SERV OF OT,EA 15 MIN: HCPCS

## 2017-04-24 NOTE — TELEPHONE ENCOUNTER
Incoming call from [place] [id number]. 2 pt identifiers confirmed. Kimberly Bowden states she would like to make MD aware of a few concerns. One pt has been complaining of loose, black stools x 2 days. BP was taken and is 106/60. Also pt is currently on 3L of oxygen continuous, pt is wearing during day as ordered but has not wearing during the night, was considering if pt may need another mask for night time. And lastly, home jose e care was initially ordered for bathing and dressing, because family felt that pt may not be able to tolerate PT, however Kimberly Bowden reports she spoke with family regarding PT and how it may help to increase pt tolerance and now family would like pt to have PT with home jose e, so a new order is needed. Dr Daphne Wilkinson please advise.

## 2017-04-25 PROCEDURE — 3331090001 HH PPS REVENUE CREDIT

## 2017-04-25 PROCEDURE — 3331090002 HH PPS REVENUE DEBIT

## 2017-04-25 NOTE — TELEPHONE ENCOUNTER
PT ordered. Please ask that pt be evaluated if loose stool continues or if home health can draw CBC.

## 2017-04-26 ENCOUNTER — HOME CARE VISIT (OUTPATIENT)
Dept: SCHEDULING | Facility: HOME HEALTH | Age: 82
End: 2017-04-26
Payer: MEDICARE

## 2017-04-26 PROCEDURE — 3331090001 HH PPS REVENUE CREDIT

## 2017-04-26 PROCEDURE — 3331090002 HH PPS REVENUE DEBIT

## 2017-04-26 PROCEDURE — G0299 HHS/HOSPICE OF RN EA 15 MIN: HCPCS

## 2017-04-26 NOTE — TELEPHONE ENCOUNTER
Spoke with patient's nurse, confirmed name and . Relayed Dr. Galloway Tyler message regarding PT order. Understanding verbalized.      Be advised

## 2017-04-26 NOTE — TELEPHONE ENCOUNTER
Attempted to contact [facility] at number provided, no answer. Lvm for [facility] to return call to office at [phone]. Will continue to try to contact [facility].

## 2017-04-27 ENCOUNTER — HOME CARE VISIT (OUTPATIENT)
Dept: SCHEDULING | Facility: HOME HEALTH | Age: 82
End: 2017-04-27
Payer: MEDICARE

## 2017-04-27 VITALS
OXYGEN SATURATION: 86 % | TEMPERATURE: 98.4 F | SYSTOLIC BLOOD PRESSURE: 118 MMHG | HEART RATE: 114 BPM | DIASTOLIC BLOOD PRESSURE: 68 MMHG

## 2017-04-27 PROCEDURE — 3331090001 HH PPS REVENUE CREDIT

## 2017-04-27 PROCEDURE — G0152 HHCP-SERV OF OT,EA 15 MIN: HCPCS

## 2017-04-27 PROCEDURE — G0156 HHCP-SVS OF AIDE,EA 15 MIN: HCPCS

## 2017-04-27 PROCEDURE — 3331090002 HH PPS REVENUE DEBIT

## 2017-04-28 ENCOUNTER — HOME CARE VISIT (OUTPATIENT)
Dept: SCHEDULING | Facility: HOME HEALTH | Age: 82
End: 2017-04-28
Payer: MEDICARE

## 2017-04-28 VITALS
DIASTOLIC BLOOD PRESSURE: 56 MMHG | TEMPERATURE: 98.2 F | SYSTOLIC BLOOD PRESSURE: 108 MMHG | HEART RATE: 64 BPM | OXYGEN SATURATION: 97 %

## 2017-04-28 VITALS
OXYGEN SATURATION: 98 % | TEMPERATURE: 98.1 F | DIASTOLIC BLOOD PRESSURE: 58 MMHG | HEART RATE: 88 BPM | SYSTOLIC BLOOD PRESSURE: 96 MMHG

## 2017-04-28 PROCEDURE — 3331090002 HH PPS REVENUE DEBIT

## 2017-04-28 PROCEDURE — 3331090001 HH PPS REVENUE CREDIT

## 2017-04-28 PROCEDURE — G0151 HHCP-SERV OF PT,EA 15 MIN: HCPCS

## 2017-04-29 ENCOUNTER — HOME CARE VISIT (OUTPATIENT)
Dept: HOME HEALTH SERVICES | Facility: HOME HEALTH | Age: 82
End: 2017-04-29
Payer: MEDICARE

## 2017-04-29 PROCEDURE — 3331090001 HH PPS REVENUE CREDIT

## 2017-04-29 PROCEDURE — 3331090002 HH PPS REVENUE DEBIT

## 2017-04-30 PROCEDURE — 3331090002 HH PPS REVENUE DEBIT

## 2017-04-30 PROCEDURE — 3331090001 HH PPS REVENUE CREDIT

## 2017-05-01 ENCOUNTER — HOME CARE VISIT (OUTPATIENT)
Dept: SCHEDULING | Facility: HOME HEALTH | Age: 82
End: 2017-05-01
Payer: MEDICARE

## 2017-05-01 PROCEDURE — 3331090001 HH PPS REVENUE CREDIT

## 2017-05-01 PROCEDURE — 3331090002 HH PPS REVENUE DEBIT

## 2017-05-01 PROCEDURE — G0156 HHCP-SVS OF AIDE,EA 15 MIN: HCPCS

## 2017-05-02 ENCOUNTER — HOME CARE VISIT (OUTPATIENT)
Dept: SCHEDULING | Facility: HOME HEALTH | Age: 82
End: 2017-05-02
Payer: MEDICARE

## 2017-05-02 VITALS
TEMPERATURE: 98.2 F | OXYGEN SATURATION: 99 % | DIASTOLIC BLOOD PRESSURE: 58 MMHG | HEART RATE: 80 BPM | SYSTOLIC BLOOD PRESSURE: 102 MMHG

## 2017-05-02 PROCEDURE — G0299 HHS/HOSPICE OF RN EA 15 MIN: HCPCS

## 2017-05-02 PROCEDURE — 3331090001 HH PPS REVENUE CREDIT

## 2017-05-02 PROCEDURE — G0152 HHCP-SERV OF OT,EA 15 MIN: HCPCS

## 2017-05-02 PROCEDURE — 3331090002 HH PPS REVENUE DEBIT

## 2017-05-03 ENCOUNTER — HOME CARE VISIT (OUTPATIENT)
Dept: SCHEDULING | Facility: HOME HEALTH | Age: 82
End: 2017-05-03
Payer: MEDICARE

## 2017-05-03 VITALS — HEART RATE: 78 BPM | SYSTOLIC BLOOD PRESSURE: 130 MMHG | DIASTOLIC BLOOD PRESSURE: 80 MMHG | OXYGEN SATURATION: 98 %

## 2017-05-03 VITALS
SYSTOLIC BLOOD PRESSURE: 111 MMHG | DIASTOLIC BLOOD PRESSURE: 60 MMHG | OXYGEN SATURATION: 99 % | HEART RATE: 98 BPM | TEMPERATURE: 97.9 F

## 2017-05-03 PROCEDURE — G0157 HHC PT ASSISTANT EA 15: HCPCS

## 2017-05-03 PROCEDURE — 3331090002 HH PPS REVENUE DEBIT

## 2017-05-03 PROCEDURE — 3331090001 HH PPS REVENUE CREDIT

## 2017-05-04 ENCOUNTER — HOME CARE VISIT (OUTPATIENT)
Dept: SCHEDULING | Facility: HOME HEALTH | Age: 82
End: 2017-05-04
Payer: MEDICARE

## 2017-05-04 VITALS
HEART RATE: 78 BPM | SYSTOLIC BLOOD PRESSURE: 110 MMHG | TEMPERATURE: 97.7 F | OXYGEN SATURATION: 98 % | DIASTOLIC BLOOD PRESSURE: 68 MMHG

## 2017-05-04 PROCEDURE — G0152 HHCP-SERV OF OT,EA 15 MIN: HCPCS

## 2017-05-04 PROCEDURE — G0156 HHCP-SVS OF AIDE,EA 15 MIN: HCPCS

## 2017-05-04 PROCEDURE — 3331090001 HH PPS REVENUE CREDIT

## 2017-05-04 PROCEDURE — 3331090002 HH PPS REVENUE DEBIT

## 2017-05-05 PROCEDURE — 3331090002 HH PPS REVENUE DEBIT

## 2017-05-05 PROCEDURE — 3331090001 HH PPS REVENUE CREDIT

## 2017-05-06 ENCOUNTER — HOME CARE VISIT (OUTPATIENT)
Dept: SCHEDULING | Facility: HOME HEALTH | Age: 82
End: 2017-05-06
Payer: MEDICARE

## 2017-05-06 PROCEDURE — 3331090001 HH PPS REVENUE CREDIT

## 2017-05-06 PROCEDURE — 3331090002 HH PPS REVENUE DEBIT

## 2017-05-06 PROCEDURE — G0157 HHC PT ASSISTANT EA 15: HCPCS

## 2017-05-07 PROCEDURE — 3331090002 HH PPS REVENUE DEBIT

## 2017-05-07 PROCEDURE — 3331090001 HH PPS REVENUE CREDIT

## 2017-05-08 ENCOUNTER — HOME CARE VISIT (OUTPATIENT)
Dept: SCHEDULING | Facility: HOME HEALTH | Age: 82
End: 2017-05-08
Payer: MEDICARE

## 2017-05-08 VITALS — DIASTOLIC BLOOD PRESSURE: 62 MMHG | SYSTOLIC BLOOD PRESSURE: 120 MMHG | HEART RATE: 77 BPM | OXYGEN SATURATION: 99 %

## 2017-05-08 PROCEDURE — G0156 HHCP-SVS OF AIDE,EA 15 MIN: HCPCS

## 2017-05-08 PROCEDURE — 3331090001 HH PPS REVENUE CREDIT

## 2017-05-08 PROCEDURE — 3331090002 HH PPS REVENUE DEBIT

## 2017-05-08 PROCEDURE — G0157 HHC PT ASSISTANT EA 15: HCPCS

## 2017-05-08 PROCEDURE — G0299 HHS/HOSPICE OF RN EA 15 MIN: HCPCS

## 2017-05-09 ENCOUNTER — HOME CARE VISIT (OUTPATIENT)
Dept: HOME HEALTH SERVICES | Facility: HOME HEALTH | Age: 82
End: 2017-05-09
Payer: MEDICARE

## 2017-05-09 VITALS — SYSTOLIC BLOOD PRESSURE: 100 MMHG | OXYGEN SATURATION: 98 % | DIASTOLIC BLOOD PRESSURE: 60 MMHG | HEART RATE: 68 BPM

## 2017-05-09 PROCEDURE — 3331090002 HH PPS REVENUE DEBIT

## 2017-05-09 PROCEDURE — 3331090001 HH PPS REVENUE CREDIT

## 2017-05-10 PROCEDURE — 3331090002 HH PPS REVENUE DEBIT

## 2017-05-10 PROCEDURE — 3331090001 HH PPS REVENUE CREDIT

## 2017-05-11 PROCEDURE — 3331090002 HH PPS REVENUE DEBIT

## 2017-05-11 PROCEDURE — 3331090001 HH PPS REVENUE CREDIT

## 2017-05-12 ENCOUNTER — TELEPHONE (OUTPATIENT)
Dept: INTERNAL MEDICINE CLINIC | Age: 82
End: 2017-05-12

## 2017-05-12 ENCOUNTER — HOME CARE VISIT (OUTPATIENT)
Dept: SCHEDULING | Facility: HOME HEALTH | Age: 82
End: 2017-05-12
Payer: MEDICARE

## 2017-05-12 ENCOUNTER — HOME CARE VISIT (OUTPATIENT)
Dept: HOME HEALTH SERVICES | Facility: HOME HEALTH | Age: 82
End: 2017-05-12
Payer: MEDICARE

## 2017-05-12 PROCEDURE — 3331090002 HH PPS REVENUE DEBIT

## 2017-05-12 PROCEDURE — 3331090001 HH PPS REVENUE CREDIT

## 2017-05-12 PROCEDURE — G0157 HHC PT ASSISTANT EA 15: HCPCS

## 2017-05-12 NOTE — TELEPHONE ENCOUNTER
Incoming call from [place] with [facility] health report that patient BP at  12:00 pm it was 76/51, at 1:00 pm 94/59 then at 1:30 pm /54, [place] reports that patient stated that her head was swimming. Informed Teresa Shannon with [facility]  Physical therapy that patient needs to go to the ED.

## 2017-05-12 NOTE — TELEPHONE ENCOUNTER
Incoming call from patient son stating that he does not want to take patient to ED. Patient son reports patient has had  these symptoms before. Per Dr. Sandip Crandall informed patients son to hold minipress. If symptoms worsen or fail to improve. Patient needs to seek medical attention.

## 2017-05-13 PROCEDURE — 3331090001 HH PPS REVENUE CREDIT

## 2017-05-13 PROCEDURE — 3331090002 HH PPS REVENUE DEBIT

## 2017-05-14 VITALS
HEART RATE: 87 BPM | SYSTOLIC BLOOD PRESSURE: 104 MMHG | TEMPERATURE: 98.3 F | DIASTOLIC BLOOD PRESSURE: 60 MMHG | RESPIRATION RATE: 18 BRPM | OXYGEN SATURATION: 99 %

## 2017-05-14 PROCEDURE — 3331090002 HH PPS REVENUE DEBIT

## 2017-05-14 PROCEDURE — 3331090001 HH PPS REVENUE CREDIT

## 2017-05-15 ENCOUNTER — HOME CARE VISIT (OUTPATIENT)
Dept: SCHEDULING | Facility: HOME HEALTH | Age: 82
End: 2017-05-15
Payer: MEDICARE

## 2017-05-15 VITALS
SYSTOLIC BLOOD PRESSURE: 110 MMHG | DIASTOLIC BLOOD PRESSURE: 66 MMHG | OXYGEN SATURATION: 99 % | TEMPERATURE: 97 F | HEART RATE: 88 BPM | RESPIRATION RATE: 22 BRPM

## 2017-05-15 PROCEDURE — G0299 HHS/HOSPICE OF RN EA 15 MIN: HCPCS

## 2017-05-15 PROCEDURE — 3331090002 HH PPS REVENUE DEBIT

## 2017-05-15 PROCEDURE — 3331090001 HH PPS REVENUE CREDIT

## 2017-05-16 PROCEDURE — 3331090001 HH PPS REVENUE CREDIT

## 2017-05-16 PROCEDURE — 3331090002 HH PPS REVENUE DEBIT

## 2017-05-17 VITALS — HEART RATE: 97 BPM | DIASTOLIC BLOOD PRESSURE: 60 MMHG | SYSTOLIC BLOOD PRESSURE: 100 MMHG | OXYGEN SATURATION: 96 %

## 2017-05-17 PROCEDURE — 3331090002 HH PPS REVENUE DEBIT

## 2017-05-17 PROCEDURE — 3331090001 HH PPS REVENUE CREDIT

## 2017-05-18 ENCOUNTER — HOME CARE VISIT (OUTPATIENT)
Dept: HOME HEALTH SERVICES | Facility: HOME HEALTH | Age: 82
End: 2017-05-18
Payer: MEDICARE

## 2017-05-18 ENCOUNTER — HOME CARE VISIT (OUTPATIENT)
Dept: SCHEDULING | Facility: HOME HEALTH | Age: 82
End: 2017-05-18
Payer: MEDICARE

## 2017-05-18 PROCEDURE — 3331090002 HH PPS REVENUE DEBIT

## 2017-05-18 PROCEDURE — 3331090001 HH PPS REVENUE CREDIT

## 2017-05-18 PROCEDURE — 3331090003 HH PPS REVENUE ADJ

## 2017-05-18 PROCEDURE — G0157 HHC PT ASSISTANT EA 15: HCPCS

## 2017-05-19 PROCEDURE — 3331090002 HH PPS REVENUE DEBIT

## 2017-05-19 PROCEDURE — 3331090001 HH PPS REVENUE CREDIT

## 2017-05-20 PROCEDURE — 3331090001 HH PPS REVENUE CREDIT

## 2017-05-20 PROCEDURE — 3331090002 HH PPS REVENUE DEBIT

## 2017-05-21 PROCEDURE — 3331090002 HH PPS REVENUE DEBIT

## 2017-05-21 PROCEDURE — 3331090001 HH PPS REVENUE CREDIT

## 2017-05-22 ENCOUNTER — OFFICE VISIT (OUTPATIENT)
Dept: INTERNAL MEDICINE CLINIC | Age: 82
End: 2017-05-22

## 2017-05-22 ENCOUNTER — HOSPITAL ENCOUNTER (OUTPATIENT)
Dept: LAB | Age: 82
Discharge: HOME | End: 2017-05-22
Payer: MEDICARE

## 2017-05-22 VITALS
TEMPERATURE: 97.1 F | BODY MASS INDEX: 31.62 KG/M2 | OXYGEN SATURATION: 98 % | DIASTOLIC BLOOD PRESSURE: 52 MMHG | WEIGHT: 171.8 LBS | HEART RATE: 87 BPM | RESPIRATION RATE: 20 BRPM | SYSTOLIC BLOOD PRESSURE: 96 MMHG | HEIGHT: 62 IN

## 2017-05-22 DIAGNOSIS — E11.9 TYPE 2 DIABETES MELLITUS WITHOUT COMPLICATION, WITH LONG-TERM CURRENT USE OF INSULIN (HCC): ICD-10-CM

## 2017-05-22 DIAGNOSIS — D50.9 IRON DEFICIENCY ANEMIA, UNSPECIFIED IRON DEFICIENCY ANEMIA TYPE: ICD-10-CM

## 2017-05-22 DIAGNOSIS — Z00.00 ROUTINE GENERAL MEDICAL EXAMINATION AT A HEALTH CARE FACILITY: Primary | ICD-10-CM

## 2017-05-22 DIAGNOSIS — R60.9 EDEMA, UNSPECIFIED TYPE: ICD-10-CM

## 2017-05-22 DIAGNOSIS — Z79.4 TYPE 2 DIABETES MELLITUS WITHOUT COMPLICATION, WITH LONG-TERM CURRENT USE OF INSULIN (HCC): ICD-10-CM

## 2017-05-22 LAB
ANION GAP BLD CALC-SCNC: 9 MMOL/L (ref 3–18)
BUN SERPL-MCNC: 29 MG/DL (ref 7–18)
BUN/CREAT SERPL: 15 (ref 12–20)
CALCIUM SERPL-MCNC: 9.7 MG/DL (ref 8.5–10.1)
CHLORIDE SERPL-SCNC: 104 MMOL/L (ref 100–108)
CO2 SERPL-SCNC: 28 MMOL/L (ref 21–32)
CREAT SERPL-MCNC: 1.9 MG/DL (ref 0.6–1.3)
ERYTHROCYTE [DISTWIDTH] IN BLOOD BY AUTOMATED COUNT: 15.1 % (ref 11.6–14.5)
EST. AVERAGE GLUCOSE BLD GHB EST-MCNC: 134 MG/DL
GLUCOSE SERPL-MCNC: 159 MG/DL (ref 74–99)
HBA1C MFR BLD: 6.3 % (ref 4.2–5.6)
HCT VFR BLD AUTO: 33.8 % (ref 35–45)
HGB BLD-MCNC: 10.8 G/DL (ref 12–16)
IRON SATN MFR SERPL: 24 %
IRON SERPL-MCNC: 61 UG/DL (ref 50–175)
MCH RBC QN AUTO: 31.4 PG (ref 24–34)
MCHC RBC AUTO-ENTMCNC: 32 G/DL (ref 31–37)
MCV RBC AUTO: 98.3 FL (ref 74–97)
PLATELET # BLD AUTO: 134 K/UL (ref 135–420)
PMV BLD AUTO: 12.2 FL (ref 9.2–11.8)
POTASSIUM SERPL-SCNC: 4.8 MMOL/L (ref 3.5–5.5)
RBC # BLD AUTO: 3.44 M/UL (ref 4.2–5.3)
SODIUM SERPL-SCNC: 141 MMOL/L (ref 136–145)
TIBC SERPL-MCNC: 251 UG/DL (ref 250–450)
WBC # BLD AUTO: 3.9 K/UL (ref 4.6–13.2)

## 2017-05-22 PROCEDURE — 3331090002 HH PPS REVENUE DEBIT

## 2017-05-22 PROCEDURE — 36415 COLL VENOUS BLD VENIPUNCTURE: CPT | Performed by: INTERNAL MEDICINE

## 2017-05-22 PROCEDURE — 3331090001 HH PPS REVENUE CREDIT

## 2017-05-22 PROCEDURE — 80048 BASIC METABOLIC PNL TOTAL CA: CPT | Performed by: INTERNAL MEDICINE

## 2017-05-22 PROCEDURE — 83540 ASSAY OF IRON: CPT | Performed by: INTERNAL MEDICINE

## 2017-05-22 PROCEDURE — 85027 COMPLETE CBC AUTOMATED: CPT | Performed by: INTERNAL MEDICINE

## 2017-05-22 PROCEDURE — 83036 HEMOGLOBIN GLYCOSYLATED A1C: CPT | Performed by: INTERNAL MEDICINE

## 2017-05-22 NOTE — PROGRESS NOTES
HISTORY OF PRESENT ILLNESS  Liana Hernandez is a 80 y.o. female. HPI Comments: 79 yo female here for [address], f/u of edema, hypotension, DM. She notes continued low BP, feels head swimming at times when standing. Minipress stopped. Still taking Lasix. This has helped with her edema. Continued O2 use prn. Would like smaller portable/concentrator. Has f/u next month with her onc regarding lung CA. PET scheduled. Had been on iron for anemia. Not sure why this was stopped. DM controlled by A1c last labs. Review of Systems   Constitutional: Positive for weight loss. Negative for chills and fever. HENT: Negative for congestion. Eyes: Negative for blurred vision and pain. Respiratory: Negative for cough and shortness of breath. Cardiovascular: Negative for chest pain, palpitations and leg swelling. Gastrointestinal: Negative for heartburn, nausea and vomiting. Genitourinary: Negative for frequency and urgency. Musculoskeletal: Negative for falls, joint pain and myalgias. Neurological: Positive for dizziness. Negative for tingling and headaches. Psychiatric/Behavioral: Negative for depression. The patient is not nervous/anxious. Past Medical History:   Diagnosis Date    [facility] [address]     Diabetes [facility] [address]     GERD (gastroesophageal reflux disease)     Glaucoma     Hypercholesterolemia     Hypertension     Lung cancer ([facility] [address]     on tarciva     Current Outpatient Prescriptions on File Prior to Visit   Medication Sig Dispense Refill    FOLIC ACID/MULTIVIT-MIN/LUTEIN (CENTRUM SILVER PO) Take 1 Tab by mouth daily.  cyanocobalamin 1,000 mcg tablet Take 1,000 mcg by mouth daily.  CALCIUM CARBONATE/VITAMIN D3 (CALTRATE 600 + D PO) Take 600 mg by mouth daily.  Omega-3 Fatty Acids-Fish Oil (OMEGA 3 FISH OIL) 684-1,200 mg cpDR Take 1 Tab by mouth daily.  loperamide (IMODIUM) 1 mg/5 mL solution Take 1 mg by mouth three (3) times daily as needed for Diarrhea.       OXYGEN-AIR DELIVERY SYSTEMS 3 L by Nasal route continuous.  acetaminophen (TYLENOL) 500 mg tablet Take 500 mg by mouth every six (6) hours as needed for Pain or Fever.  furosemide (LASIX) 20 mg tablet 1 tab po q day for leg swelling 30 Tab 1    atorvastatin (LIPITOR) 20 mg tablet Take 20 mg by mouth daily.  omeprazole (PRILOSEC) 20 mg capsule take 1 capsule by mouth once daily 90 Cap 5    BD INSULIN PEN NEEDLE UF SHORT 31 gauge x 5/16\" ndle USE AS DIRECTED TWICE DAILY 200 Pen Needle 5    insulin glargine (LANTUS SOLOSTAR) 100 unit/mL (3 mL) pen Use as directed based on endocrinology recommendations (Patient taking differently: 20 Units by SubCUTAneous route nightly. SLIDING SCALE   0-120=0  121-160= 10 UNITS  161-249= 20 UNITS  OVER 250= 20 UNITS  ) 1 Each 5    glucose blood VI test strips (ONETOUCH ULTRA TEST) strip Use as directed to test blood sugar 100 Strip 5    brinzolamide-brimonidine (SIMBRINZA) 1-0.2 % drps Apply 1 Drop to eye two (2) times a day. patient reports use of eye drops in both eyes      HUMALOG KWIKPEN 100 unit/mL kwikpen INJECT 6 UNITS BEFORE DINNER INCREASE TO 8 UNITS IF SUGAR IS OVER 200 (Patient taking differently: BELOW 160 TAKING 3 UNITS ABOVE 160 TAKING 4 UNITS ABOVE 200 TAKING 5 UNITS) 15 mL 11    aspirin 81 mg chewable tablet Take 81 mg by mouth daily.  FERROUS SULFATE Take 325 mg by mouth daily.  ipratropium (ATROVENT) 0.03 % nasal spray 2 Sprays by Both Nostrils route two (2) times daily as needed for Rhinitis.  prazosin (MINIPRESS) 1 mg capsule Take 1 mg by mouth three (3) times daily.  levoFLOXacin (LEVAQUIN) 750 mg tablet 1 tab po q every other day for one week 4 Tab 0    Artifi. Tear, Hypromellose,-PF 0.3 % drop Apply 1 Drop to eye daily as needed.  brinzolamide (AZOPT) 1 % ophthalmic suspension Administer 1 Drop to both eyes two (2) times a day.  gabapentin (NEURONTIN) 100 mg capsule Take 100 mg by mouth two (2) times a day.  oxyCODONE-acetaminophen (PERCOCET) 5-325 mg per tablet Take 1 Tab by mouth every six (6) hours as needed for Pain. Take for 7 days      RESTASIS 0.05 % ophthalmic emulsion instill 1 drop into both eyes twice a day  0    prazosin (MINIPRESS) 1 mg capsule 1 mg.  TAGRISSO 80 mg tablet Take 80 mg by mouth daily. No current facility-administered medications on file prior to visit. Social History   Substance Use Topics    Smoking status: Never Smoker    Smokeless tobacco: Never Used    Alcohol use No     Physical Exam   Constitutional: She appears well-developed and well-nourished. No distress. BP 96/52 (BP 1 Location: Left arm, BP Patient Position: Sitting)  Pulse 87  Temp 97.1 °F (36.2 °C) (Oral)   Resp 20  Ht 5' 2\" (1.575 m)  Wt 171 lb 12.8 oz (77.9 kg)  SpO2 98%  BMI 31.42 kg/m2     Eyes: EOM are normal. Right eye exhibits no discharge. Left eye exhibits no discharge. No scleral icterus. Neck: Neck supple. Cardiovascular: Normal rate, regular rhythm and normal heart sounds. Exam reveals no gallop and no friction rub. No murmur heard. Pulmonary/Chest: Effort normal and breath sounds normal. No respiratory distress. She has no wheezes. She has no rales. Musculoskeletal: She exhibits no edema or tenderness. Lymphadenopathy:     She has no cervical adenopathy. Neurological: She is alert. She exhibits normal muscle tone. Skin: Skin is warm and dry. Psychiatric: She has a normal mood and affect.         Lab Results   Component Value Date/Time    Sodium 142 02/06/2017 10:16 AM    Potassium 4.1 02/06/2017 10:16 AM    Chloride 108 02/06/2017 10:16 AM    CO2 27 02/06/2017 10:16 AM    Anion gap 7 02/06/2017 10:16 AM    Glucose 86 02/06/2017 10:16 AM    BUN 30 02/06/2017 10:16 AM    Creatinine 1.82 02/06/2017 10:16 AM    BUN/Creatinine ratio 16 02/06/2017 10:16 AM    GFR est AA 32 02/06/2017 10:16 AM    GFR est non-AA 26 02/06/2017 10:16 AM    Calcium 9.3 02/06/2017 10:16 AM Bilirubin, total 0.3 09/08/2014 10:29 AM    AST (SGOT) 30 09/08/2014 10:29 AM    Alk. phosphatase 83 09/08/2014 10:29 AM    Protein, total 6.3 09/08/2014 10:29 AM    Albumin 3.5 09/08/2014 10:29 AM    A-G Ratio 1.3 09/08/2014 10:29 AM    ALT (SGPT) 24 09/08/2014 10:29 AM     Lab Results   Component Value Date/Time    WBC 4.4 02/06/2017 10:16 AM    WBC 6.4 07/02/2012 01:54 PM    HGB 11.2 02/06/2017 10:16 AM    HCT 35.0 02/06/2017 10:16 AM    PLATELET 753 45/52/8157 10:16 AM    MCV 98.9 02/06/2017 10:16 AM     Lab Results   Component Value Date/Time    Hemoglobin A1c 7.2 06/01/2015 12:10 PM    Hemoglobin A1c (POC) 6.7 01/08/2016 11:30 AM     ASSESSMENT and PLAN    ICD-10-CM ICD-9-CM    1. Routine general medical examination at a health care facility Z00.00 V70.0    2. Type 2 diabetes mellitus without complication, with long-term current use of insulin (HCC) E11.9 250.00 HEMOGLOBIN A1C WITH EAG    Z79.4 V58.67 MICROALBUMIN, UR, RAND W/ MICROALBUMIN/CREA RATIO   3. Iron deficiency anemia, unspecified iron deficiency anemia type D50.9 280.9 CBC W/O DIFF      IRON PROFILE   4. Edema, unspecified type Q68.2 985.3 METABOLIC PANEL, BASIC   5. Encounter for immunization Z23 V03.89 PNEUMOCOCCAL CONJ VACCINE 13 VALENT IM   Will repeat labs today. Hold lasix for now and can resume for short courses if needed for edema given her low BP. Discussed her weight loss. Has PET planned with her onc. Will monitor PO intake. This is a Subsequent Medicare Annual Wellness Visit providing Personalized Prevention Plan Services (PPPS) (Performed 12 months after initial AWV and PPPS )    I have reviewed the patient's medical history in detail and updated the computerized patient record. History   79 yo female here for [address] She feels okay at this time. Still with dizziness at times. Edema improved. Lives alone but good support from family. Has life alert as well.   Past Medical History:   Diagnosis Date    Cancer ([facility] [address]     [facility] ([facility] [address]     GERD (gastroesophageal reflux disease)     Glaucoma     Hypercholesterolemia     Hypertension     Lung cancer ([facility] [address]     on tarciva      Past Surgical History:   Procedure Laterality Date    DELIVERY       HX BREAST BIOPSY Right X 3    all benign    HX HYSTERECTOMY  years ago    partial via abdmen    HX OTHER SURGICAL      ulcer on toe    HX OTHER SURGICAL      removal \"boil\" from chest     Current Outpatient Prescriptions   Medication Sig Dispense Refill    FOLIC ACID/MULTIVIT-MIN/LUTEIN (CENTRUM SILVER PO) Take 1 Tab by mouth daily.  cyanocobalamin 1,000 mcg tablet Take 1,000 mcg by mouth daily.  CALCIUM CARBONATE/VITAMIN D3 (CALTRATE 600 + D PO) Take 600 mg by mouth daily.  Omega-3 Fatty Acids-Fish Oil (OMEGA 3 FISH OIL) 684-1,200 mg cpDR Take 1 Tab by mouth daily.  loperamide (IMODIUM) 1 mg/5 mL solution Take 1 mg by mouth three (3) times daily as needed for Diarrhea.  OXYGEN-AIR DELIVERY SYSTEMS 3 L by Nasal route continuous.  acetaminophen (TYLENOL) 500 mg tablet Take 500 mg by mouth every six (6) hours as needed for Pain or Fever.  furosemide (LASIX) 20 mg tablet 1 tab po q day for leg swelling 30 Tab 1    Artifi. Tear, Hypromellose,-PF 0.3 % drop Apply 1 Drop to eye daily as needed.  atorvastatin (LIPITOR) 20 mg tablet Take 20 mg by mouth daily.  RESTASIS 0.05 % ophthalmic emulsion instill 1 drop into both eyes twice a day  0    omeprazole (PRILOSEC) 20 mg capsule take 1 capsule by mouth once daily 90 Cap 5    BD INSULIN PEN NEEDLE UF SHORT 31 gauge x /16\" ndle USE AS DIRECTED TWICE DAILY 200 Pen Needle 5    TAGRISSO 80 mg tablet Take 80 mg by mouth daily.  insulin glargine (LANTUS SOLOSTAR) 100 unit/mL (3 mL) pen Use as directed based on endocrinology recommendations (Patient taking differently: 20 Units by SubCUTAneous route nightly.  SLIDING SCALE   0-120=0  121-160= 10 UNITS  161-249= 20 UNITS  OVER 250= 20 UNITS  ) 1 Each 5    glucose blood VI test strips (ONETOUCH ULTRA TEST) strip Use as directed to test blood sugar 100 Strip 5    brinzolamide-brimonidine (SIMBRINZA) 1-0.2 % drps Apply 1 Drop to eye two (2) times a day. patient reports use of eye drops in both eyes      HUMALOG KWIKPEN 100 unit/mL kwikpen INJECT 6 UNITS BEFORE DINNER INCREASE TO 8 UNITS IF SUGAR IS OVER 200 (Patient taking differently: BELOW 160 TAKING 3 UNITS ABOVE 160 TAKING 4 UNITS ABOVE 200 TAKING 5 UNITS) 15 mL 11     Allergies   Allergen Reactions    Egg Other (comments)     Per Dr. Cecy Jerome note on 11/20/13: She cannot take the flu shot because of egg allergy       Family History   Problem Relation Age of Onset    No Known Problems Other      Social History   Substance Use Topics    Smoking status: Never Smoker    Smokeless tobacco: Never Used    Alcohol use No     Patient Active Problem List   Diagnosis Code    Pure hypercholesterolemia E78.00    Hypertension I10    Diabetes mellitus ([address] E11.9    Burn T30.0    Lung cancer ([address] C34.90    Diabetic polyneuropathy associated with diabetes mellitus due to underlying condition ([address] E08.42    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 Requires assistance with: bathing and hygiene    Fall Risk     Fall Risk Assessment, last 12 mths 5/22/2017   Able to walk? Yes   Fall in past 12 months? No     Abuse Screen   Patient is not abused    Review of Systems   A comprehensive review of systems was negative except for that written in the HPI.     Physical Examination     Evaluation of Cognitive Function:  Mood/affect:  happy  Appearance: age appropriate  Family member/caregiver input: son, Hayes Gonzalez    Visit Vitals    BP 96/52 (BP 1 Location: Left arm, BP Patient Position: Sitting)    Pulse 87    Temp 97.1 °F (36.2 °C) (Oral)    Resp 20    Ht 5' 2\" (1.575 m)    Wt 171 lb 12.8 oz (77.9 kg)    SpO2 98%    BMI 31.42 kg/m2     General appearance: alert, cooperative, no distress, appears stated age  Ears: abnormal external canal AD - debris in canal, abnormal external canal AS - debris in canal  Lungs: clear to auscultation bilaterally  Heart: regular rate and rhythm, S1, S2 normal, no murmur, click, rub or gallop  Extremities: no edema    Patient Care Team:  Aric Moss MD as PCP - General (Internal Medicine)  Kash Alegre MD (General Surgery)  Liana Brooke MD (Ophthalmology)  Hayley Valenzuela MD (Cardiology)  Eben Chavez DPM (Podiatry)  Francisco Bellamy MD (Internal Medicine)  Tenisha Carlton RN as Nurse Kamron Corley MD (Hematology and Oncology)    Advice/Referrals/Counseling   Education and counseling provided:  End-of-Life planning (with patient's consent)      Assessment/Plan       ICD-10-CM ICD-9-CM    1. Routine general medical examination at a health care facility Z00.00 V70.0    2. Type 2 diabetes mellitus without complication, with long-term current use of insulin (HCC) E11.9 250.00 HEMOGLOBIN A1C WITH EAG    Z79.4 V58.67 MICROALBUMIN, UR, RAND W/ MICROALBUMIN/CREA RATIO   3. Iron deficiency anemia, unspecified iron deficiency anemia type D50.9 280.9 CBC W/O DIFF      IRON PROFILE   4. Edema, unspecified type Y66.8 004.9 METABOLIC PANEL, BASIC   5. Encounter for immunization Z23 V03.89 PNEUMOCOCCAL CONJ VACCINE 13 VALENT IM   Discussed ACP. Has paperwork regarding this. Her son Hayes Gonzalez would be her surrogate decision maker. Will check with her onc regarding prevnar given oral chemo.

## 2017-05-22 NOTE — MR AVS SNAPSHOT
Visit Information Date & Time Provider Department Dept. Phone Encounter #  
 5/22/2017  9:30 AM [address] [phone] [id number] Follow-up Instructions Return in about 3 months (around 8/22/2017), or if symptoms worsen or fail to improve. Upcoming Health Maintenance Date Due  
 FOOT EXAM Q1 3/9/1940 DTaP/Tdap/Td series (1 - Tdap) 3/9/1951 ZOSTER VACCINE AGE 60> 3/9/1990 OSTEOPOROSIS SCREENING (DEXA) 3/9/1995 HEMOGLOBIN A1C Q6M 11/22/2017 LIPID PANEL Q1 11/28/2017 EYE EXAM RETINAL OR DILATED Q1 12/27/2017 MICROALBUMIN Q1 5/22/2018 MEDICARE YEARLY EXAM 5/23/2018 GLAUCOMA SCREENING Q2Y 12/27/2018 Allergies as of 5/22/2017  Review Complete On: 5/22/2017 By: Pascual Baca LPN Severity Noted Reaction Type Reactions Egg  02/20/2014    Other (comments) Per Dr. Silvia Gaytan note on 11/20/13: She cannot take the flu shot because of egg allergy Current Immunizations  Never Reviewed Name Date Pneumococcal Conjugate (PCV-13)  Incomplete Pneumococcal Polysaccharide (PPSV-23) 11/3/2012 Not reviewed this visit You Were Diagnosed With   
  
 Codes Comments Routine general medical examination at a health care facility    -  Primary ICD-10-CM: Z00.00 ICD-9-CM: V70.0 Type 2 diabetes mellitus without complication, with long-term current use of insulin (HCC)     ICD-10-CM: E11.9, Z79.4 ICD-9-CM: 250.00, V58.67 Iron deficiency anemia, unspecified iron deficiency anemia type     ICD-10-CM: D50.9 ICD-9-CM: 280.9 Edema, unspecified type     ICD-10-CM: R60.9 ICD-9-CM: 782.3 Encounter for immunization     ICD-10-CM: S94 ICD-9-CM: V03.89 Vitals BP Pulse Temp Resp Height(growth percentile) Weight(growth percentile) 96/52 (BP 1 Location: Left arm, BP Patient Position: Sitting) 87 97.1 °F (36.2 °C) (Oral) 20 5' 2\" (1.575 m) 171 lb 12.8 oz (77.9 kg) SpO2 BMI OB Status Smoking Status 98% 31.42 kg/m2 Hysterectomy Never Smoker Vitals History BMI and BSA Data Body Mass Index Body Surface Area  
 31.42 kg/m 2 1.85 m 2 Preferred Pharmacy Pharmacy Name Phone [address] [facility], [address] [place] [address] [phone] Your Updated Medication List  
  
   
This list is accurate as of: 5/22/17 10:23 AM.  Always use your most recent med list.  
  
  
  
  
 acetaminophen 500 mg tablet Commonly known as:  TYLENOL Take 500 mg by mouth every six (6) hours as needed for Pain or Fever. Artifi. Tear (Hypromellose)-PF 0.3 % Drop Apply 1 Drop to eye daily as needed. atorvastatin 20 mg tablet Commonly known as:  LIPITOR Take 20 mg by mouth daily. BD INSULIN PEN NEEDLE UF SHORT 31 gauge x 5/16\" Ndle Generic drug:  Insulin Needles (Disposable) USE AS DIRECTED TWICE DAILY CALTRATE 600 + D PO Take 600 mg by mouth daily. CENTRUM SILVER PO Take 1 Tab by mouth daily. cyanocobalamin 1,000 mcg tablet Take 1,000 mcg by mouth daily. furosemide 20 mg tablet Commonly known as:  LASIX  
1 tab po q day for leg swelling  
  
 glucose blood VI test strips strip Commonly known as:  ONETOUCH ULTRA TEST Use as directed to test blood sugar HumaLOG KwikPen 100 unit/mL kwikpen Generic drug:  insulin lispro INJECT 6 UNITS BEFORE DINNER INCREASE TO 8 UNITS IF SUGAR IS OVER 200  
  
 insulin glargine 100 unit/mL (3 mL) pen Commonly known as:  LANTUS SOLOSTAR Use as directed based on endocrinology recommendations  
  
 loperamide 1 mg/5 mL solution Commonly known as:  IMODIUM Take 1 mg by mouth three (3) times daily as needed for Diarrhea. OMEGA 3 FISH -1,200 mg Cpdr  
Generic drug:  Omega-3 Fatty Acids-Fish Oil Take 1 Tab by mouth daily. omeprazole 20 mg capsule Commonly known as:  PRILOSEC  
 take 1 capsule by mouth once daily OXYGEN-AIR DELIVERY SYSTEMS  
3 L by Nasal route continuous. RESTASIS 0.05 % ophthalmic emulsion Generic drug:  cycloSPORINE  
instill 1 drop into both eyes twice a day SIMBRINZA 1-0.2 % Drps Generic drug:  brinzolamide-brimonidine Apply 1 Drop to eye two (2) times a day. patient reports use of eye drops in both eyes TAGRISSO 80 mg tablet Generic drug:  osimertinib Take 80 mg by mouth daily. We Performed the Following PNEUMOCOCCAL CONJ VACCINE 13 VALENT IM [id number] CPT(R)] Follow-up Instructions Return in about 3 months (around 8/22/2017), or if symptoms worsen or fail to improve. To-Do List   
 05/22/2017 Lab:  CBC W/O DIFF   
  
 05/22/2017 Lab:  HEMOGLOBIN A1C WITH EAG   
  
 05/22/2017 Lab:  IRON PROFILE   
  
 05/22/2017 Lab:  METABOLIC PANEL, BASIC   
  
 05/22/2017 Lab:  MICROALBUMIN, UR, RAND W/ MICROALBUMIN/CREA RATIO Patient Instructions Hold furosemide (Lasix) for now. You can start taking this again if your edema returns. Try taking for 3 days and then hold if symptoms improve. Leg and Ankle Edema: Care Instructions Your Care Instructions Swelling in the legs, ankles, and feet is called edema. It is common after you sit or stand for a while. Long plane flights or car rides often cause swelling in the legs and feet. You may also have swelling if you have to stand for long periods of time at your job. Problems with the veins in the legs (varicose veins) and changes in hormones can also cause swelling. Sometimes the swelling in the ankles and feet is caused by a more serious problem, such as heart failure, infection, blood clots, or liver or kidney disease. Follow-up care is a key part of your treatment and safety. Be sure to make and go to all appointments, and call your doctor if you are having problems.  Its also a good idea to know your test results and keep a list of the medicines you take. How can you care for yourself at home? · If your doctor gave you medicine, take it as prescribed. Call your doctor if you think you are having a problem with your medicine. · Whenever you are resting, raise your legs up. Try to keep the swollen area higher than the level of your heart. · Take breaks from standing or sitting in one position. ¨ Walk around to increase the blood flow in your lower legs. ¨ Move your feet and ankles often while you stand, or tighten and relax your leg muscles. · Wear support stockings. Put them on in the morning, before swelling gets worse. · Eat a balanced diet. Lose weight if you need to. · Limit the amount of salt (sodium) in your diet. Salt holds fluid in the body and may increase swelling. When should you call for help? Call 911 anytime you think you may need emergency care. For example, call if: 
· You have symptoms of a blood clot in your lung (called a pulmonary embolism). These may include: 
¨ Sudden chest pain. ¨ Trouble breathing. ¨ Coughing up blood. Call your doctor now or seek immediate medical care if: 
· You have signs of a blood clot, such as: 
¨ Pain in your calf, back of the knee, thigh, or groin. ¨ Redness and swelling in your leg or groin. · You have symptoms of infection, such as: 
¨ Increased pain, swelling, warmth, or redness. ¨ Red streaks or pus. ¨ A fever. Watch closely for changes in your health, and be sure to contact your doctor if: 
· Your swelling is getting worse. · You have new or worsening pain in your legs. · You do not get better as expected. Where can you learn more? Go to http://quirino-zeinab.info/. Enter P760 in the search box to learn more about \"Leg and Ankle Edema: Care Instructions. \" Current as of: May 27, 2016 Content Version: 11.2 © 6868-1189 [employer].  Care instructions adapted under license by [employer] (which disclaims liability or warranty for this information). If you have questions about a medical condition or this instruction, always ask your healthcare professional. [address] any warranty or liability for your use of this information. Introducing [facility]! Dear Brendan Bellamy: 
Thank you for requesting a Yostro account. Our records indicate that you already have an active Yostro account. You can access your account anytime at https://5 examples. EatWith/5 examples Did you know that you can access your hospital and ER discharge instructions at any time in Yostro? You can also review all of your test results from your hospital stay or ER visit. Additional Information If you have questions, please visit the Frequently Asked Questions section of the Yostro website at [email or web address] examples/. Remember, Yostro is NOT to be used for urgent needs. For medical emergencies, dial 911. Now available from your iPhone and Android! Please provide this summary of care documentation to your next provider. Your primary care clinician is listed as Daniel Villagomez. If you have any questions after today's visit, please call [phone].

## 2017-05-22 NOTE — PATIENT INSTRUCTIONS
Hold furosemide (Lasix) for now. You can start taking this again if your edema returns. Try taking for 3 days and then hold if symptoms improve. Leg and Ankle Edema: Care Instructions  Your Care Instructions  Swelling in the legs, ankles, and feet is called edema. It is common after you sit or stand for a while. Long plane flights or car rides often cause swelling in the legs and feet. You may also have swelling if you have to stand for long periods of time at your job. Problems with the veins in the legs (varicose veins) and changes in hormones can also cause swelling. Sometimes the swelling in the ankles and feet is caused by a more serious problem, such as heart failure, infection, blood clots, or liver or kidney disease. Follow-up care is a key part of your treatment and safety. Be sure to make and go to all appointments, and call your doctor if you are having problems. Its also a good idea to know your test results and keep a list of the medicines you take. How can you care for yourself at home? · If your doctor gave you medicine, take it as prescribed. Call your doctor if you think you are having a problem with your medicine. · Whenever you are resting, raise your legs up. Try to keep the swollen area higher than the level of your heart. · Take breaks from standing or sitting in one position. ¨ Walk around to increase the blood flow in your lower legs. ¨ Move your feet and ankles often while you stand, or tighten and relax your leg muscles. · Wear support stockings. Put them on in the morning, before swelling gets worse. · Eat a balanced diet. Lose weight if you need to. · Limit the amount of salt (sodium) in your diet. Salt holds fluid in the body and may increase swelling. When should you call for help? Call 911 anytime you think you may need emergency care. For example, call if:  · You have symptoms of a blood clot in your lung (called a pulmonary embolism).  These may include:  ¨ Sudden chest pain. ¨ Trouble breathing. ¨ Coughing up blood. Call your doctor now or seek immediate medical care if:  · You have signs of a blood clot, such as:  ¨ Pain in your calf, back of the knee, thigh, or groin. ¨ Redness and swelling in your leg or groin. · You have symptoms of infection, such as:  ¨ Increased pain, swelling, warmth, or redness. ¨ Red streaks or pus. ¨ A fever. Watch closely for changes in your health, and be sure to contact your doctor if:  · Your swelling is getting worse. · You have new or worsening pain in your legs. · You do not get better as expected. Where can you learn more? Go to http://quirino-zeinab.info/. Enter Q842 in the search box to learn more about \"Leg and Ankle Edema: Care Instructions. \"  Current as of: May 27, 2016  Content Version: 11.2  © 1759-2343 [employer]. Care instructions adapted under license by [employer] (which disclaims liability or warranty for this information). If you have questions about a medical condition or this instruction, always ask your healthcare professional. [place] [address] any warranty or liability for your use of this information.

## 2017-05-23 ENCOUNTER — PATIENT OUTREACH (OUTPATIENT)
Dept: INTERNAL MEDICINE CLINIC | Age: 82
End: 2017-05-23

## 2017-05-23 ENCOUNTER — TELEPHONE (OUTPATIENT)
Dept: INTERNAL MEDICINE CLINIC | Age: 82
End: 2017-05-23

## 2017-05-23 PROCEDURE — 3331090002 HH PPS REVENUE DEBIT

## 2017-05-23 PROCEDURE — 3331090001 HH PPS REVENUE CREDIT

## 2017-05-23 NOTE — TELEPHONE ENCOUNTER
NN return call to Mr Andre Matos. He is requesting to change oxygen companies. Pt is using [facility]. Pt needs smaller tanks for travel. She is using large tanks for travel. She does not have a small portable tank and [facility] doesn't carry them. Pt is on 3L oxygen. NN outgoing call to [address]. Rep stated pt can request company change. They do have small tanks that can accommodate 3L. Return call to Mr Andre Matos and advised him of above, spoke to Mrs Andre Matos also. Encouraged to call the office for questions or concerns. They verbalized understanding and thanked us for the call. 10minutes.

## 2017-05-23 NOTE — PROGRESS NOTES
Return call from Ms Sandra Peralta. She want us to transfer oxygen companies. NN outgoing call to [employer], left voice mail message for return call.

## 2017-05-24 ENCOUNTER — TELEPHONE (OUTPATIENT)
Dept: INTERNAL MEDICINE CLINIC | Age: 82
End: 2017-05-24

## 2017-05-24 PROCEDURE — 3331090002 HH PPS REVENUE DEBIT

## 2017-05-24 PROCEDURE — 3331090001 HH PPS REVENUE CREDIT

## 2017-05-24 NOTE — TELEPHONE ENCOUNTER
----- Message from Souleymane Duarte MD sent at 5/24/2017  8:09 AM EDT -----  Please let her know her labs are stable except her creatinine has increased a little. We can monitor this now that she will not be taking Lasix (furosemide) every day.

## 2017-05-25 PROCEDURE — 3331090002 HH PPS REVENUE DEBIT

## 2017-05-25 PROCEDURE — 3331090001 HH PPS REVENUE CREDIT

## 2017-05-25 NOTE — PROGRESS NOTES
NN outgoing to [facility], spoke to Khalif about transferring oxygen to [address] per pt request.      She will fax the documentation they have. She will review transfer process and call back tomorrow about what the pt needs to do.

## 2017-05-26 ENCOUNTER — TELEPHONE (OUTPATIENT)
Dept: INTERNAL MEDICINE CLINIC | Age: 82
End: 2017-05-26

## 2017-05-26 NOTE — TELEPHONE ENCOUNTER
[facility] [facility] contacted office to speak with NN in regards to switching DME company.   [facility] [facility] stated they required a Medical Release form which can be faxed to [phone]

## 2017-05-26 NOTE — PROGRESS NOTES
NN outgoing call to [address] to see what's needed for oxygen to transfer from [address] [place] [address]. Spoke to Juan Carlos. Demographics, hospital discharge summary [facility] 4/3/17, oxygen qualifying testing, oxygen order. [facility]:  Office [phone]  Fax [phone]    All the above has been faxed and placed for scanning. Mr Rosi Ibarra has been made aware.

## 2017-06-01 ENCOUNTER — HOSPITAL ENCOUNTER (OUTPATIENT)
Dept: LAB | Age: 82
Discharge: HOME | End: 2017-06-01
Payer: MEDICARE

## 2017-06-01 DIAGNOSIS — E11.9 TYPE 2 DIABETES MELLITUS WITHOUT COMPLICATION, WITH LONG-TERM CURRENT USE OF INSULIN (HCC): ICD-10-CM

## 2017-06-01 DIAGNOSIS — Z79.4 TYPE 2 DIABETES MELLITUS WITHOUT COMPLICATION, WITH LONG-TERM CURRENT USE OF INSULIN (HCC): ICD-10-CM

## 2017-06-01 LAB
CREAT UR-MCNC: 148.79 MG/DL (ref 30–125)
MICROALBUMIN UR-MCNC: 9.8 MG/DL (ref 0–3)
MICROALBUMIN/CREAT UR-RTO: 66 MG/G (ref 0–30)

## 2017-06-01 PROCEDURE — 82043 UR ALBUMIN QUANTITATIVE: CPT | Performed by: INTERNAL MEDICINE

## 2017-06-07 ENCOUNTER — TELEPHONE (OUTPATIENT)
Dept: INTERNAL MEDICINE CLINIC | Age: 82
End: 2017-06-07

## 2017-06-10 RX ORDER — FUROSEMIDE 20 MG/1
TABLET ORAL
Qty: 30 TAB | Refills: 1 | Status: SHIPPED | OUTPATIENT
Start: 2017-06-10 | End: 2018-02-06 | Stop reason: SDUPTHER

## 2017-06-19 NOTE — TELEPHONE ENCOUNTER
Lab Results   Component Value Date/Time    Microalbumin/Creat ratio (mg/g creat) 66 06/01/2017 01:53 PM    Microalbumin,urine random 9.80 06/01/2017 01:53 PM     This is stable compared with labs done in the past at [facility]. Treatment is to control her diabetes.

## 2017-06-19 NOTE — TELEPHONE ENCOUNTER
NN outgoing call to [address], spoke to Drake Mendoza, she stated they did not receive the fax re oxygen. Fax number confirmed [phone]  All documents refaxed.

## 2017-06-19 NOTE — TELEPHONE ENCOUNTER
NN outgoing call [facility], spoke to Ayala Mondragon. She confirmed the fax was received and will let us know if they are able to provide service. NN outgoing call to Mr Zaragoza, left message on voice re oxygen and labs.

## 2017-06-19 NOTE — TELEPHONE ENCOUNTER
Return call to [employer] They haven't heard anything from [address] re oxygen and he was checking on the status. I will call [address] to follow up. He is also asking about pt's urine results, and wants to know if anything additional needs to be done. Pt has not been taking lasix and blood pressure is doing better. Will forward for review.

## 2017-06-21 ENCOUNTER — PATIENT OUTREACH (OUTPATIENT)
Dept: INTERNAL MEDICINE CLINIC | Age: 82
End: 2017-06-21

## 2017-06-21 NOTE — PROGRESS NOTES
Faxed letter to [facility] to request medical records pertaining to oxygen orders and testing. To aide patient in transferring companies. Jr:  Office [phone]  Fax [phone]    Incoming call from [facility], spoke to Padmini silveira. She states they do have smaller oxygen tanks and no new orders are required, patient just has to request them and they have other tanks that are continuous flow with post dose. She will call the pt and make aware and also faxed records to us. Incoming notes from Danilo were faxed to [address].    Office: [phone]  Fax: [phone]

## 2017-06-21 NOTE — LETTER
2017 9:29 AM 
 
 
To: [facility] [address] [place], [address] [facility] 
[phone] The purpose of this letter is to request medical records pertaining to oxygen orders and oxygen testing for the patient during her start of service with your company (jennifer 2017). Patient: [id number] [facility] [address] Kaushik Richards is her primary care provider. Thank you, Pauly Green RN, Nurse Navigator

## 2017-06-21 NOTE — TELEPHONE ENCOUNTER
NN outgoing call to [facility] spoke to Ever Horacio. She states hospital oxygen orders are not signed and there is no NPI. In order to process the request a new oxygen order, office visit notes and new qualifying test is needed. Pt was seen 5/22/17. Will review notes from [facility] to see if we can use there records.

## 2017-06-30 NOTE — PROGRESS NOTES
NN outgoing call to [address] to see if any additional information is required. Detailed Written Order has been signed and faxed 6/30/17. Also called Lynda to advise, left message for Criselda Kee. Spoke to family to advise additional information has been sent and will follow up with updates.

## 2017-07-05 ENCOUNTER — PATIENT OUTREACH (OUTPATIENT)
Dept: INTERNAL MEDICINE CLINIC | Age: 82
End: 2017-07-05

## 2017-07-23 RX ORDER — ATORVASTATIN CALCIUM 40 MG/1
TABLET, FILM COATED ORAL
Qty: 60 TAB | Refills: 5 | Status: SHIPPED | OUTPATIENT
Start: 2017-07-23 | End: 2018-12-22 | Stop reason: SDUPTHER

## 2017-09-27 RX ORDER — OMEPRAZOLE 20 MG/1
CAPSULE, DELAYED RELEASE ORAL
Qty: 90 CAP | Refills: 5 | Status: SHIPPED | OUTPATIENT
Start: 2017-09-27 | End: 2018-12-10 | Stop reason: SDUPTHER

## 2017-11-09 ENCOUNTER — HOSPITAL ENCOUNTER (OUTPATIENT)
Dept: LAB | Age: 82
Discharge: HOME | End: 2017-11-09
Payer: MEDICARE

## 2017-11-09 ENCOUNTER — OFFICE VISIT (OUTPATIENT)
Dept: INTERNAL MEDICINE CLINIC | Age: 82
End: 2017-11-09

## 2017-11-09 VITALS
HEART RATE: 86 BPM | RESPIRATION RATE: 14 BRPM | TEMPERATURE: 97.5 F | WEIGHT: 167 LBS | BODY MASS INDEX: 30.73 KG/M2 | OXYGEN SATURATION: 97 % | HEIGHT: 62 IN | SYSTOLIC BLOOD PRESSURE: 129 MMHG | DIASTOLIC BLOOD PRESSURE: 65 MMHG

## 2017-11-09 DIAGNOSIS — E08.42 DIABETIC POLYNEUROPATHY ASSOCIATED WITH DIABETES MELLITUS DUE TO UNDERLYING CONDITION (HCC): Primary | ICD-10-CM

## 2017-11-09 DIAGNOSIS — E78.00 PURE HYPERCHOLESTEROLEMIA: ICD-10-CM

## 2017-11-09 DIAGNOSIS — I10 ESSENTIAL HYPERTENSION: ICD-10-CM

## 2017-11-09 DIAGNOSIS — H61.23 CERUMEN DEBRIS ON TYMPANIC MEMBRANE OF BOTH EARS: ICD-10-CM

## 2017-11-09 DIAGNOSIS — E08.42 DIABETIC POLYNEUROPATHY ASSOCIATED WITH DIABETES MELLITUS DUE TO UNDERLYING CONDITION (HCC): ICD-10-CM

## 2017-11-09 LAB
ALBUMIN SERPL-MCNC: 3.6 G/DL (ref 3.4–5)
ALBUMIN/GLOB SERPL: 0.9 {RATIO} (ref 0.8–1.7)
ALP SERPL-CCNC: 81 U/L (ref 45–117)
ALT SERPL-CCNC: 63 U/L (ref 13–56)
ANION GAP SERPL CALC-SCNC: 9 MMOL/L (ref 3–18)
AST SERPL-CCNC: 48 U/L (ref 15–37)
BILIRUB SERPL-MCNC: 0.2 MG/DL (ref 0.2–1)
BUN SERPL-MCNC: 29 MG/DL (ref 7–18)
BUN/CREAT SERPL: 16 (ref 12–20)
CALCIUM SERPL-MCNC: 9.1 MG/DL (ref 8.5–10.1)
CHLORIDE SERPL-SCNC: 107 MMOL/L (ref 100–108)
CHOLEST SERPL-MCNC: 158 MG/DL
CO2 SERPL-SCNC: 26 MMOL/L (ref 21–32)
CREAT SERPL-MCNC: 1.85 MG/DL (ref 0.6–1.3)
ERYTHROCYTE [DISTWIDTH] IN BLOOD BY AUTOMATED COUNT: 14 % (ref 11.6–14.5)
GLOBULIN SER CALC-MCNC: 3.8 G/DL (ref 2–4)
GLUCOSE SERPL-MCNC: 102 MG/DL (ref 74–99)
HBA1C MFR BLD: 6.4 % (ref 4.2–5.6)
HCT VFR BLD AUTO: 35.8 % (ref 35–45)
HDLC SERPL-MCNC: 97 MG/DL (ref 40–60)
HDLC SERPL: 1.6 {RATIO} (ref 0–5)
HGB BLD-MCNC: 11.5 G/DL (ref 12–16)
LDLC SERPL CALC-MCNC: 41.6 MG/DL (ref 0–100)
LIPID PROFILE,FLP: ABNORMAL
MCH RBC QN AUTO: 32.1 PG (ref 24–34)
MCHC RBC AUTO-ENTMCNC: 32.1 G/DL (ref 31–37)
MCV RBC AUTO: 100 FL (ref 74–97)
PLATELET # BLD AUTO: 114 K/UL (ref 135–420)
PMV BLD AUTO: 11.9 FL (ref 9.2–11.8)
POTASSIUM SERPL-SCNC: 5 MMOL/L (ref 3.5–5.5)
PROT SERPL-MCNC: 7.4 G/DL (ref 6.4–8.2)
RBC # BLD AUTO: 3.58 M/UL (ref 4.2–5.3)
SODIUM SERPL-SCNC: 142 MMOL/L (ref 136–145)
TRIGL SERPL-MCNC: 97 MG/DL (ref ?–150)
VLDLC SERPL CALC-MCNC: 19.4 MG/DL
WBC # BLD AUTO: 4 K/UL (ref 4.6–13.2)

## 2017-11-09 PROCEDURE — 36415 COLL VENOUS BLD VENIPUNCTURE: CPT | Performed by: INTERNAL MEDICINE

## 2017-11-09 PROCEDURE — 80053 COMPREHEN METABOLIC PANEL: CPT | Performed by: INTERNAL MEDICINE

## 2017-11-09 PROCEDURE — 80061 LIPID PANEL: CPT | Performed by: INTERNAL MEDICINE

## 2017-11-09 PROCEDURE — 83036 HEMOGLOBIN GLYCOSYLATED A1C: CPT | Performed by: INTERNAL MEDICINE

## 2017-11-09 PROCEDURE — 85027 COMPLETE CBC AUTOMATED: CPT | Performed by: INTERNAL MEDICINE

## 2017-11-09 RX ORDER — LANCETS 28 GAUGE
EACH MISCELLANEOUS
Qty: 200 LANCET | Refills: 5 | Status: SHIPPED | OUTPATIENT
Start: 2017-11-09 | End: 2018-07-16 | Stop reason: SDUPTHER

## 2017-11-09 NOTE — PROGRESS NOTES
ROOM [address] presents today for   Chief Complaint   Patient presents with    Diabetes    Hypertension    Cholesterol Problem       Shireen Purcell preferred language for health care discussion is english/other. Is someone accompanying this pt? Yes, daughter in law    Is the patient using any DME equipment during [address]? Yes, walker    Depression Screening:  PHQ over the last two weeks 5/22/2017 4/17/2017 4/12/2017 3/30/2017 2/6/2017 11/21/2016 10/19/2016   Little interest or pleasure in doing things Not at all Not at all Not at all Several days Not at all Not at all Not at all   Feeling down, depressed or hopeless Not at all Not at all Not at all Not at all Not at all Not at all Not at all   Total Score PHQ 2 0 0 0 1 0 0 0       Learning Assessment:  Learning Assessment 6/1/2015 11/20/2013 8/19/2013 6/6/2013   PRIMARY LEARNER Patient Patient Patient Patient   HIGHEST LEVEL OF EDUCATION - PRIMARY LEARNER  2 YEARS OF COLLEGE 2 [address] [facility] - - Yes -   [facility] - - > 4 [address] - No No -   LEARNER [id number] [employer] (COMMENT) DEMONSTRATION     VIDEOS - DEMONSTRATION -   LEARNER [address] [facility] 18 - no no -   ANSWERED BY patient patient patient patient   RELATIONSHIP SELF SELF - SELF       Abuse Screening:  Abuse Screening Questionnaire 5/22/2017 6/1/2015   Do you ever feel afraid of your partner? N N   Are you in a relationship with someone who physically or mentally threatens you? N N   Is it safe for you to go home?  Y Y       Fall Risk  Fall Risk Assessment, last 12 mths 5/22/2017 4/17/2017 4/12/2017 3/30/2017 2/6/2017 11/21/2016 10/19/2016   Able to walk? Yes Yes Yes Yes Yes Yes Yes   Fall in past 12 months? No No No No No No No   Fall with injury? - - - - - - -       Health Maintenance reviewed and discussed per provider. Yes    Jeramie Gutierrez is due for foot exam, a1c, lipid, dexa, zoster, tdap. Please order/place referral if appropriate. Pt currently taking Antiplatelet therapy? Yes     Advance Directive:  1. Do you have an advance directive in place? Patient Reply: no    2. If not, would you like material regarding how to put one in place? Patient Reply: yes    Coordination of Care:  1. Have you been to the ER, urgent care clinic since your last visit? Hospitalized since your last visit? no    2. Have you seen or consulted any other health care providers outside of the [address] [place] [address] since your last visit? Include any pap smears or colon screening. no    Please see Red banners under Allergies, Med rec, Immunizations to remove outside inquires. All correct information has been verified with patient and added to chart.

## 2017-11-09 NOTE — MR AVS SNAPSHOT
Visit Information Date & Time Provider Department Dept. Phone Encounter #  
 11/9/2017  9:45 AM Naa Norman, [facility] [phone] [id number] Follow-up Instructions Return in about 4 months (around 3/9/2018), or if symptoms worsen or fail to improve, for diabetes, hypertension. Upcoming Health Maintenance Date Due DTaP/Tdap/Td series (1 - Tdap) 3/9/1951 ZOSTER VACCINE AGE 60> 1/9/1990 OSTEOPOROSIS SCREENING (DEXA) 3/9/1995 EYE EXAM RETINAL OR DILATED Q1 12/27/2017 HEMOGLOBIN A1C Q6M 5/9/2018 MEDICARE YEARLY EXAM 5/23/2018 MICROALBUMIN Q1 6/1/2018 FOOT EXAM Q1 11/9/2018 LIPID PANEL Q1 11/9/2018 GLAUCOMA SCREENING Q2Y 12/27/2018 Allergies as of 11/9/2017  Review Complete On: 11/9/2017 By: Bhaskar Buitrago Severity Noted Reaction Type Reactions Egg  02/20/2014    Other (comments) Per Dr. Viral Covarrubias note on 11/20/13: She cannot take the flu shot because of egg allergy Current Immunizations  Never Reviewed Name Date Pneumococcal Polysaccharide (PPSV-23) 11/3/2012 Not reviewed this visit You Were Diagnosed With   
  
 Codes Comments Diabetic polyneuropathy associated with diabetes mellitus due to underlying condition ([facility]    -  Primary ICD-10-CM: P34.50 
ICD-9-CM: 249.60, 357.2 Essential hypertension     ICD-10-CM: I10 
ICD-9-CM: 401.9 Pure hypercholesterolemia     ICD-10-CM: E78.00 ICD-9-CM: 272.0 Cerumen debris on tympanic membrane of both ears     ICD-10-CM: H61.23 
ICD-9-CM: 380.4 Vitals BP Pulse Temp Resp Height(growth percentile) Weight(growth percentile) 129/65 (BP 1 Location: Left arm, BP Patient Position: Sitting) 86 97.5 °F (36.4 °C) (Oral) 14 5' 2\" (1.575 m) 167 lb (75.8 kg) SpO2 BMI OB Status Smoking Status 97% 30.54 kg/m2 Hysterectomy Never Smoker Vitals History BMI and BSA Data Body Mass Index Body Surface Area 30.54 kg/m 2 1.82 m 2 Preferred Pharmacy Pharmacy Name Phone [address] [facility] [phone] Your Updated Medication List  
  
   
This list is accurate as of: 11/9/17 11:10 AM.  Always use your most recent med list.  
  
  
  
  
 acetaminophen 500 mg tablet Commonly known as:  TYLENOL Take 500 mg by mouth every six (6) hours as needed for Pain or Fever. Artifi. Tear (Hypromellose)-PF 0.3 % Drop Apply 1 Drop to eye daily as needed. * atorvastatin 20 mg tablet Commonly known as:  LIPITOR Take 20 mg by mouth daily. * atorvastatin 40 mg tablet Commonly known as:  LIPITOR  
take 1 tablet by mouth once daily BD INSULIN PEN NEEDLE UF SHORT 31 gauge x 5/16\" Ndle Generic drug:  Insulin Needles (Disposable) USE AS DIRECTED TWICE DAILY CALTRATE 600 + D PO Take 600 mg by mouth daily. CENTRUM SILVER PO Take 1 Tab by mouth daily. cyanocobalamin 1,000 mcg tablet Take 1,000 mcg by mouth daily. furosemide 20 mg tablet Commonly known as:  LASIX  
take 1 tablet by mouth once daily for edema  
  
 glucose blood VI test strips strip Commonly known as:  ONETOUCH ULTRA TEST Use as directed to test blood sugar TID HumaLOG KwikPen 100 unit/mL kwikpen Generic drug:  insulin lispro INJECT 6 UNITS BEFORE DINNER INCREASE TO 8 UNITS IF SUGAR IS OVER 200  
  
 insulin glargine 100 unit/mL (3 mL) Inpn Commonly known as:  LANTUS SOLOSTAR Use as directed based on endocrinology recommendations  
  
 lancets 28 gauge Misc Commonly known as:  FREESTYLE LANCETS Use as directed to test blood glucose TID  
  
 loperamide 1 mg/5 mL solution Commonly known as:  IMODIUM Take 1 mg by mouth three (3) times daily as needed for Diarrhea. OMEGA 3 FISH -1,200 mg Cpdr  
Generic drug:  Omega-3 Fatty Acids-Fish Oil Take 1 Tab by mouth daily. omeprazole 20 mg capsule Commonly known as:  PRILOSEC  
take 1 capsule by mouth once daily OXYGEN-AIR DELIVERY SYSTEMS  
3 L by Nasal route continuous. RESTASIS 0.05 % ophthalmic emulsion Generic drug:  cycloSPORINE  
instill 1 drop into both eyes twice a day SIMBRINZA 1-0.2 % Drps Generic drug:  brinzolamide-brimonidine Apply 1 Drop to eye two (2) times a day. patient reports use of eye drops in both eyes TAGRISSO 80 mg tablet Generic drug:  osimertinib Take 80 mg by mouth daily. * Notice: This list has 2 medication(s) that are the same as other medications prescribed for you. Read the directions carefully, and ask your doctor or other care provider to review them with you. Prescriptions Sent to Pharmacy Refills  
 glucose blood VI test strips (ONETOUCH ULTRA TEST) strip 5 Sig: Use as directed to test blood sugar TID Class: Normal  
 Pharmacy: [address], [address] Ph #: [phone]  
 lancets (FREESTYLE LANCETS) 28 gauge misc 5 Sig: Use as directed to test blood glucose TID Class: Normal  
 Pharmacy: [address], [address] Ph #: [phone] We Performed the Following  DIABETES FOOT EXAM [7 Custom] Follow-up Instructions Return in about 4 months (around 3/9/2018), or if symptoms worsen or fail to improve, for diabetes, hypertension. To-Do List   
 11/09/2017 Lab:  CBC W/O DIFF   
  
 11/09/2017 Lab:  HEMOGLOBIN A1C W/O EAG   
  
 11/09/2017 Lab:  LIPID PANEL   
  
 11/09/2017 Lab:  METABOLIC PANEL, COMPREHENSIVE Patient Instructions Earwax Blockage: Care Instructions Your Care Instructions Earwax is a natural substance that protects the ear canal. Normally, earwax drains from the ears and does not cause problems. Sometimes earwax builds up and hardens.  Earwax blockage (also called cerumen impaction) can cause some loss of hearing and pain. When wax is tightly packed, you will need to have your doctor remove it. Follow-up care is a key part of your treatment and safety. Be sure to make and go to all appointments, and call your doctor if you are having problems. It's also a good idea to know your test results and keep a list of the medicines you take. How can you care for yourself at home? · Do not try to remove earwax with cotton swabs, fingers, or other objects. This can make the blockage worse and damage the eardrum. · If your doctor recommends that you try to remove earwax at home: ¨ Soften and loosen the earwax with warm mineral oil. You also can try hydrogen peroxide mixed with an equal amount of room temperature water. Place 2 drops of the fluid, warmed to body temperature, in the ear two times a day for up to 5 days. ¨ Once the wax is loose and soft, all that is usually needed to remove it from the ear canal is a gentle, warm shower. Direct the water into the ear, then tip your head to let the earwax drain out. Dry your ear thoroughly with a hair dryer set on low. Hold the dryer several inches from your ear. ¨ If the warm mineral oil and shower do not work, use an over-the-counter wax softener followed by gentle flushing with an ear syringe each night for a week or two. Make sure the flushing solution is body temperature. Cool or hot fluids in the ear can cause dizziness. When should you call for help? Call your doctor now or seek immediate medical care if: 
? · Pus or blood drains from your ear. ? · Your ears are ringing or feel full. ? · You have a loss of hearing. ? Watch closely for changes in your health, and be sure to contact your doctor if: 
? · You have pain or reduced hearing after 1 week of home treatment. ? · You have any new symptoms, such as nausea or balance problems. Where can you learn more? Go to http://quirino-zeinab.info/. Enter C272 in the search box to learn more about \"Earwax Blockage: Care Instructions. \" Current as of: March 20, 2017 Content Version: 11.4 © 0301-2741 [employer]. Care instructions adapted under license by [employer] (which disclaims liability or warranty for this information). If you have questions about a medical condition or this instruction, always ask your healthcare professional. [address] any warranty or liability for your use of this information. Introducing [facility]! Dear Nelida Uriarte: 
Thank you for requesting a Sentillion account. Our records indicate that you already have an active Sentillion account. You can access your account anytime at [email or web address] Did you know that you can access your hospital and ER discharge instructions at any time in Sentillion? You can also review all of your test results from your hospital stay or ER visit. Additional Information If you have questions, please visit the Frequently Asked Questions section of the Sentillion website at [email or web address] Remember, Sentillion is NOT to be used for urgent needs. For medical emergencies, dial 911. Now available from your iPhone and Android! Please provide this summary of care documentation to your next provider. Your primary care clinician is listed as Daniel Villagomez. If you have any questions after today's visit, please call [phone].

## 2017-11-09 NOTE — ACP (ADVANCE CARE PLANNING)
Advance Directive:  1. Do you have an advance directive in place? Patient Reply: no    2. If not, would you like material regarding how to put one in place?  Patient Reply: yes

## 2017-11-09 NOTE — PATIENT INSTRUCTIONS
Earwax Blockage: Care Instructions  Your Care Instructions    Earwax is a natural substance that protects the ear canal. Normally, earwax drains from the ears and does not cause problems. Sometimes earwax builds up and hardens. Earwax blockage (also called cerumen impaction) can cause some loss of hearing and pain. When wax is tightly packed, you will need to have your doctor remove it. Follow-up care is a key part of your treatment and safety. Be sure to make and go to all appointments, and call your doctor if you are having problems. It's also a good idea to know your test results and keep a list of the medicines you take. How can you care for yourself at home? · Do not try to remove earwax with cotton swabs, fingers, or other objects. This can make the blockage worse and damage the eardrum. · If your doctor recommends that you try to remove earwax at home:  ¨ Soften and loosen the earwax with warm mineral oil. You also can try hydrogen peroxide mixed with an equal amount of room temperature water. Place 2 drops of the fluid, warmed to body temperature, in the ear two times a day for up to 5 days. ¨ Once the wax is loose and soft, all that is usually needed to remove it from the ear canal is a gentle, warm shower. Direct the water into the ear, then tip your head to let the earwax drain out. Dry your ear thoroughly with a hair dryer set on low. Hold the dryer several inches from your ear. ¨ If the warm mineral oil and shower do not work, use an over-the-counter wax softener followed by gentle flushing with an ear syringe each night for a week or two. Make sure the flushing solution is body temperature. Cool or hot fluids in the ear can cause dizziness. When should you call for help? Call your doctor now or seek immediate medical care if:  ? · Pus or blood drains from your ear. ? · Your ears are ringing or feel full. ? · You have a loss of hearing. ? Watch closely for changes in your health, and be sure to contact your doctor if:  ? · You have pain or reduced hearing after 1 week of home treatment. ? · You have any new symptoms, such as nausea or balance problems. Where can you learn more? Go to http://quirino-zeinab.info/. Enter G609 in the search box to learn more about \"Earwax Blockage: Care Instructions. \"  Current as of: March 20, 2017  Content Version: 11.4  © 8686-5582 [employer]et. Care instructions adapted under license by [employer] (which disclaims liability or warranty for this information). If you have questions about a medical condition or this instruction, always ask your healthcare professional. [place] [address] any warranty or liability for your use of this information.

## 2018-02-12 ENCOUNTER — OFFICE VISIT (OUTPATIENT)
Dept: INTERNAL MEDICINE CLINIC | Age: 83
End: 2018-02-12

## 2018-02-12 ENCOUNTER — HOSPITAL ENCOUNTER (OUTPATIENT)
Dept: LAB | Age: 83
Discharge: HOME | End: 2018-02-12
Payer: MEDICARE

## 2018-02-12 VITALS
DIASTOLIC BLOOD PRESSURE: 79 MMHG | BODY MASS INDEX: 31.1 KG/M2 | HEART RATE: 86 BPM | WEIGHT: 169 LBS | TEMPERATURE: 98 F | SYSTOLIC BLOOD PRESSURE: 156 MMHG | OXYGEN SATURATION: 98 % | HEIGHT: 62 IN | RESPIRATION RATE: 16 BRPM

## 2018-02-12 DIAGNOSIS — E08.42 DIABETIC POLYNEUROPATHY ASSOCIATED WITH DIABETES MELLITUS DUE TO UNDERLYING CONDITION (HCC): ICD-10-CM

## 2018-02-12 DIAGNOSIS — I10 ESSENTIAL HYPERTENSION: ICD-10-CM

## 2018-02-12 DIAGNOSIS — R30.9 PAIN WITH URINATION: Primary | ICD-10-CM

## 2018-02-12 DIAGNOSIS — R30.9 PAIN WITH URINATION: ICD-10-CM

## 2018-02-12 LAB
BILIRUB UR QL STRIP: NEGATIVE
GLUCOSE UR-MCNC: NEGATIVE MG/DL
KETONES P FAST UR STRIP-MCNC: NORMAL MG/DL
PH UR STRIP: 7.5 [PH] (ref 4.6–8)
PROT UR QL STRIP: NORMAL
SP GR UR STRIP: 1.02 (ref 1–1.03)
UA UROBILINOGEN AMB POC: NORMAL (ref 0.2–1)
URINALYSIS CLARITY POC: NORMAL
URINALYSIS COLOR POC: NORMAL
URINE BLOOD POC: NORMAL
URINE LEUKOCYTES POC: NORMAL
URINE NITRITES POC: NEGATIVE

## 2018-02-12 PROCEDURE — 87186 SC STD MICRODIL/AGAR DIL: CPT | Performed by: INTERNAL MEDICINE

## 2018-02-12 PROCEDURE — 87077 CULTURE AEROBIC IDENTIFY: CPT | Performed by: INTERNAL MEDICINE

## 2018-02-12 PROCEDURE — 87086 URINE CULTURE/COLONY COUNT: CPT | Performed by: INTERNAL MEDICINE

## 2018-02-12 RX ORDER — SULFAMETHOXAZOLE AND TRIMETHOPRIM 800; 160 MG/1; MG/1
1 TABLET ORAL 2 TIMES DAILY
Qty: 6 TAB | Refills: 0 | Status: SHIPPED | OUTPATIENT
Start: 2018-02-12 | End: 2018-02-15

## 2018-02-12 RX ORDER — INSULIN PUMP SYRINGE, 3 ML
EACH MISCELLANEOUS
Qty: 1 KIT | Refills: 0 | Status: SHIPPED | OUTPATIENT
Start: 2018-02-12 | End: 2019-08-30

## 2018-02-12 NOTE — PROGRESS NOTES
ROOM # [address] presents today for   Chief Complaint   Patient presents with    Urinary Pain     x 2 weeks with burning       Juan Ramon Tiki preferred language for health care discussion is english/other. Is someone accompanying this pt? Yes, son    Is the patient using any DME equipment during [address]? Yes, rolling walker    Depression Screening:  PHQ over the last two weeks 5/22/2017 4/17/2017 4/12/2017 3/30/2017 2/6/2017 11/21/2016 10/19/2016   Little interest or pleasure in doing things Not at all Not at all Not at all Several days Not at all Not at all Not at all   Feeling down, depressed or hopeless Not at all Not at all Not at all Not at all Not at all Not at all Not at all   Total Score PHQ 2 0 0 0 1 0 0 0       Learning Assessment:  Learning Assessment 6/1/2015 11/20/2013 8/19/2013 6/6/2013   PRIMARY LEARNER Patient Patient Patient Patient   HIGHEST LEVEL OF EDUCATION - PRIMARY LEARNER  2 YEARS OF COLLEGE 2 [address] [id number] [facility] CAREGIVER - - Yes -   [facility] - - > 4 [address] [facility] - No No -   LEARNER [id number] [facility] [facility] (COMMENT) DEMONSTRATION     VIDEOS - DEMONSTRATION -   LEARNER [id number] [facility] - no no -   ANSWERED BY patient patient patient patient   RELATIONSHIP SELF SELF - SELF       Abuse Screening:  Abuse Screening Questionnaire 5/22/2017 6/1/2015   Do you ever feel afraid of your partner? N N   Are you in a relationship with someone who physically or mentally threatens you? N N   Is it safe for you to go home?  Y Y       Fall Risk  Fall Risk Assessment, last 12 mths 2/12/2018 5/22/2017 4/17/2017 4/12/2017 3/30/2017 2/6/2017 11/21/2016   Able to walk? Yes Yes Yes Yes Yes Yes Yes   Fall in past 12 months? No No No No No No No   Fall with injury? - - - - - - -       Health Maintenance reviewed and discussed per provider. Yes    Tony Yo is due for tdap, zoster, dexa, eye exam.  Please order/place referral if appropriate. Advance Directive:  1. Do you have an advance directive in place? Patient Reply: no    2. If not, would you like material regarding how to put one in place? Patient Reply: no      Coordination of Care:  1. Have you been to the ER, urgent care clinic since your last visit? Hospitalized since your last visit? no    2. Have you seen or consulted any other health care providers outside of the [facility] since your last visit? Include any pap smears or colon screening. no    Please see Red banners under Allergies, Med rec, Immunizations to remove outside inquires. All correct information has been verified with patient and added to chart.

## 2018-02-12 NOTE — PROGRESS NOTES
HISTORY OF PRESENT ILLNESS  Conrad Delvalle is a 80 y.o. female. HPI Comments: 81 yo female here for f/u of DM, HTN, c/o dysuria over past few weeks. Sx improving some but still with increased frequency. DM: has not been checking as frequently as her glucometer is malfunctioning. Last A1c was 6.4% in NOV. HTN: BP is a little elevated today. Normally is very well controlled/low. Review of Systems   Constitutional: Negative for chills, fever and weight loss. HENT: Negative for congestion and ear pain. Eyes: Negative for blurred vision and pain. Respiratory: Negative for cough and shortness of breath. Cardiovascular: Negative for chest pain, palpitations and leg swelling. Gastrointestinal: Negative for nausea and vomiting. Genitourinary: Positive for dysuria and frequency. Negative for flank pain and urgency. Musculoskeletal: Negative for joint pain and myalgias. Skin: Negative for itching and rash. Neurological: Negative for dizziness, tingling and headaches. Psychiatric/Behavioral: Negative for depression. The patient is not nervous/anxious.       Past Medical History:   Diagnosis Date    [facility] ([facility]     Diabetes ([facility]     GERD (gastroesophageal reflux disease)     Glaucoma     Hypercholesterolemia     Hypertension     Lung cancer ([facility]     on tarciva     Current Outpatient Prescriptions on File Prior to Visit   Medication Sig Dispense Refill    furosemide (LASIX) 20 mg tablet take 1 tablet by mouth once daily for edema 30 Tab 5    glucose blood VI test strips (ONETOUCH ULTRA TEST) strip Use as directed to test blood sugar  Strip 5    lancets (FREESTYLE LANCETS) 28 gauge misc Use as directed to test blood glucose  Lancet 5    omeprazole (PRILOSEC) 20 mg capsule take 1 capsule by mouth once daily 90 Cap 5    atorvastatin (LIPITOR) 40 mg tablet take 1 tablet by mouth once daily 60 Tab 5    FOLIC ACID/MULTIVIT-MIN/LUTEIN (CENTRUM SILVER PO) Take 1 Tab by mouth daily.  cyanocobalamin 1,000 mcg tablet Take 1,000 mcg by mouth daily.  CALCIUM CARBONATE/VITAMIN D3 (CALTRATE 600 + D PO) Take 600 mg by mouth daily.  Omega-3 Fatty Acids-Fish Oil (OMEGA 3 FISH OIL) 684-1,200 mg cpDR Take 1 Tab by mouth daily.  loperamide (IMODIUM) 1 mg/5 mL solution Take 1 mg by mouth three (3) times daily as needed for Diarrhea.  OXYGEN-AIR DELIVERY SYSTEMS 3 L by Nasal route continuous.  acetaminophen (TYLENOL) 500 mg tablet Take 500 mg by mouth every six (6) hours as needed for Pain or Fever.  Artifi. Tear, Hypromellose,-PF 0.3 % drop Apply 1 Drop to eye daily as needed.  RESTASIS 0.05 % ophthalmic emulsion instill 1 drop into both eyes twice a day  0    BD INSULIN PEN NEEDLE UF SHORT 31 gauge x 5/16\" ndle USE AS DIRECTED TWICE DAILY 200 Pen Needle 5    TAGRISSO 80 mg tablet Take 80 mg by mouth daily.  insulin glargine (LANTUS SOLOSTAR) 100 unit/mL (3 mL) pen Use as directed based on endocrinology recommendations (Patient taking differently: 20 Units by SubCUTAneous route nightly. SLIDING SCALE   0-120=0  121-160= 10 UNITS  161-249= 22 UNITS  OVER 250= 26 UNITS) 1 Each 5    brinzolamide-brimonidine (SIMBRINZA) 1-0.2 % drps Apply 1 Drop to eye two (2) times a day. patient reports use of eye drops in both eyes      HUMALOG KWIKPEN 100 unit/mL kwikpen INJECT 6 UNITS BEFORE DINNER INCREASE TO 8 UNITS IF SUGAR IS OVER 200 (Patient taking differently: BELOW 160 TAKING 3 UNITS ABOVE 160 TAKING 1 UNITS ABOVE 200 TAKING 2 UNITS) 15 mL 11     No current facility-administered medications on file prior to visit. Social History   Substance Use Topics    Smoking status: Never Smoker    Smokeless tobacco: Never Used    Alcohol use No     Physical Exam   Constitutional: She appears well-developed and well-nourished. No distress.    /79 (BP 1 Location: Right arm, BP Patient Position: Sitting)  Pulse 86  Temp 98 °F (36.7 °C) (Oral)   Resp 16 Ht 5' 2\" (1.575 m)  Wt 169 lb (76.7 kg)  SpO2 98%  BMI 30.91 kg/m2     Eyes: EOM are normal. Right eye exhibits no discharge. Left eye exhibits no discharge. No scleral icterus. Neck: Neck supple. Cardiovascular: Normal rate, regular rhythm and normal heart sounds. Exam reveals no gallop and no friction rub. No murmur heard. Pulmonary/Chest: Effort normal and breath sounds normal. No respiratory distress. She has no wheezes. She has no rales. Abdominal: Soft. There is no tenderness. There is no rebound. Musculoskeletal: She exhibits no edema or tenderness. Lymphadenopathy:     She has no cervical adenopathy. Neurological: She is alert. She exhibits normal muscle tone. Skin: Skin is warm and dry. Psychiatric: She has a normal mood and affect. Lab Results   Component Value Date/Time    Hemoglobin A1c 6.4 (H) 11/09/2017 11:19 AM    Hemoglobin A1c (POC) 6.7 01/08/2016 11:30 AM     Lab Results   Component Value Date/Time    Sodium 142 11/09/2017 11:19 AM    Potassium 5.0 11/09/2017 11:19 AM    Chloride 107 11/09/2017 11:19 AM    CO2 26 11/09/2017 11:19 AM    Anion gap 9 11/09/2017 11:19 AM    Glucose 102 (H) 11/09/2017 11:19 AM    BUN 29 (H) 11/09/2017 11:19 AM    Creatinine 1.85 (H) 11/09/2017 11:19 AM    BUN/Creatinine ratio 16 11/09/2017 11:19 AM    GFR est AA 31 (L) 11/09/2017 11:19 AM    GFR est non-AA 26 (L) 11/09/2017 11:19 AM    Calcium 9.1 11/09/2017 11:19 AM    Bilirubin, total 0.2 11/09/2017 11:19 AM    AST (SGOT) 48 (H) 11/09/2017 11:19 AM    Alk. phosphatase 81 11/09/2017 11:19 AM    Protein, total 7.4 11/09/2017 11:19 AM    Albumin 3.6 11/09/2017 11:19 AM    Globulin 3.8 11/09/2017 11:19 AM    A-G Ratio 0.9 11/09/2017 11:19 AM    ALT (SGPT) 63 (H) 11/09/2017 11:19 AM     ASSESSMENT and PLAN    ICD-10-CM ICD-9-CM    1. Pain with urination R30.9 788.1 AMB POC URINALYSIS DIP STICK AUTO W/O MICRO      CULTURE, URINE   2.  Diabetic polyneuropathy associated with diabetes mellitus due to underlying condition (HCC) E08.42 249.60      357.2    3. Essential hypertension I10 401.9      UA c/w UTI Will send for UC and treat with abx   Discussed allowing glc to run higher/avoid hypoglycemia. She has been dosing insulin on sliding scale. Asked that she start dosing at 20 points higher than she has been and continue to monitor. Monitor BP.

## 2018-02-12 NOTE — PATIENT INSTRUCTIONS
It is okay to begin your insulin sliding scale at 20 points higher than you have been doing. I would rather allow for higher readings than hypoglycemia. Nutrition Tips for Diabetes: After Your Visit  Your Care Instructions  A healthy diet is important to manage diabetes. It helps you lose weight (if you need to) and keep it off. It gives you the nutrition and energy your body needs and helps prevent heart disease. But a diet for diabetes does not mean that you have to eat special foods. You can eat what your family eats, including occasional sweets and other favorites. But you do have to pay attention to how often you eat and how much you eat of certain foods. The right plan for you will give you meals that help you keep your blood sugar at healthy levels. Try to eat a variety of foods and to spread carbohydrate throughout the day. Carbohydrate raises blood sugar higher and more quickly than any other nutrient does. Carbohydrate is found in sugar, breads and cereals, fruit, starchy vegetables such as potatoes and corn, and milk and yogurt. You may want to work with a dietitian or diabetes educator to help you plan meals and snacks. A dietitian or diabetes educator also can help you lose weight if that is one of your goals. The following tips can help you enjoy your meals and stay healthy. Follow-up care is a key part of your treatment and safety. Be sure to make and go to all appointments, and call your doctor if you are having problems. Its also a good idea to know your test results and keep a list of the medicines you take. How can you care for yourself at home? · Learn which foods have carbohydrate and how much carbohydrate to eat. A dietitian or diabetes educator can help you learn to keep track of how much carbohydrate you eat. · Spread carbohydrate throughout the day. Eat some carbohydrate at all meals, but do not eat too much at any one time. · Plan meals to include food from all the food groups. These are the food groups and some example portion sizes:  ¨ Grains: 1 slice of bread (1 ounce), ½ cup of cooked cereal, and 1/3 cup of cooked pasta or rice. These have about 15 grams of carbohydrate in a serving. Choose whole grains such as whole wheat bread or crackers, oatmeal, and brown rice more often than refined grains. ¨ Fruit: 1 small fresh fruit, such as an apple or orange; ½ of a banana; ½ cup of chopped, cooked, or canned fruit; ½ cup of fruit juice; 1 cup of melon or raspberries; and 2 tablespoons of dried fruit. These have about 15 grams of carbohydrate in a serving. ¨ Dairy: 1 cup of nonfat or low-fat milk and 2/3 cup of plain yogurt. These have about 15 grams of carbohydrate in a serving. ¨ Protein foods: Beef, chicken, turkey, fish, eggs, tofu, cheese, cottage cheese, and peanut butter. A serving size of meat is 3 ounces, which is about the size of a deck of cards. Examples of meat substitute serving sizes (equal to 1 ounce of meat) are 1/4 cup of cottage cheese, 1 egg, 1 tablespoon of peanut butter, and ½ cup of tofu. These have very little or no carbohydrate per serving. ¨ Vegetables: Starchy vegetables such as ½ cup of cooked dried beans, peas, potatoes, or corn have about 15 grams of carbohydrate. Nonstarchy vegetables have very little carbohydrate, such as 1 cup of raw leafy vegetables (such as spinach), ½ cup of other vegetables (cooked or chopped), and 3/4 cup of vegetable juice. · Use the plate format to plan meals. It is a good, quick way to make sure that you have a balanced meal. It also helps you spread carbohydrate throughout the day. You divide your plate by types of foods. Put vegetables on half the plate, meat or meat substitutes on one-quarter of the plate, and a grain or starchy vegetable (such as brown rice or a potato) in the final quarter of the plate.  To this you can add a small piece of fruit and 1 cup of milk or yogurt, depending on how much carbohydrate you are supposed to eat at a meal.  · Talk to your dietitian or diabetes educator about ways to add limited amounts of sweets into your meal plan. You can eat these foods now and then, as long as you include the amount of carbohydrate they have in your daily carbohydrate allowance. · If you drink alcohol, limit it to no more than 1 drink a day for women and 2 drinks a day for men. If you are pregnant, no amount of alcohol is known to be safe. · Protein, fat, and fiber do not raise blood sugar as much as carbohydrate does. If you eat a lot of these nutrients in a meal, your blood sugar will rise more slowly than it would otherwise. · Limit saturated fats, such as those from meat and dairy products. Try to replace it with monounsaturated fat, such as olive oil. This is a healthier choice because people who have diabetes are at higher-than-average risk of heart disease. But use a modest amount of olive oil. A tablespoon of olive oil has 14 grams of fat and 120 calories. · Exercise lowers blood sugar. If you take insulin by shots or pump, you can use less than you would if you were not exercising. Keep in mind that timing matters. If you exercise within 1 hour after a meal, your body may need less insulin for that meal than it would if you exercised 3 hours after the meal. Test your blood sugar to find out how exercise affects your need for insulin. · Exercise on most days of the week. Aim for at least 30 minutes. Exercise helps you stay at a healthy weight and helps your body use insulin. Walking is an easy way to get exercise. Gradually increase the amount you walk every day. You also may want to swim, bike, or do other activities. When you eat out  · Learn to estimate the serving sizes of foods that have carbohydrate. If you measure food at home, it will be easier to estimate the amount in a serving of restaurant food.   · If the meal you order has too much carbohydrate (such as potatoes, corn, or baked beans), ask to have a low-carbohydrate food instead. Ask for a salad or green vegetables. · If you use insulin, check your blood sugar before and after eating out to help you plan how much to eat in the future. · If you eat more carbohydrate at a meal than you had planned, take a walk or do other exercise. This will help lower your blood sugar. Where can you learn more? Go to Path.be  Enter T727 in the search box to learn more about \"Nutrition Tips for Diabetes: After Your Visit. \"   © 4459-0859 Healthwise, Incorporated. Care instructions adapted under license by [employer] (which disclaims liability or warranty for this information). This care instruction is for use with your licensed healthcare professional. If you have questions about a medical condition or this instruction, always ask your healthcare professional. [address] any warranty or liability for your use of this information.   Content Version: 26.8.181377; Current as of: June 4, 2014

## 2018-02-12 NOTE — MR AVS SNAPSHOT
[address] [facility] [id number] 
[phone] Patient: [address]  N MRN: [id number] FXZ:[birth year] Visit Information Date & Time Provider Department Dept. Phone Encounter #  
 2/12/2018 11:15 AM [phone] Follow-up Instructions Return in about 3 months (around 5/12/2018), or if symptoms worsen or fail to improve, for diabetes, hypertension. Upcoming Health Maintenance Date Due DTaP/Tdap/Td series (1 - Tdap) 3/9/1951 ZOSTER VACCINE AGE 60> 1/9/1990 OSTEOPOROSIS SCREENING (DEXA) 3/9/1995 EYE EXAM RETINAL OR DILATED Q1 12/27/2017 HEMOGLOBIN A1C Q6M 5/9/2018 MEDICARE YEARLY EXAM 5/23/2018 MICROALBUMIN Q1 6/1/2018 FOOT EXAM Q1 11/9/2018 LIPID PANEL Q1 11/9/2018 GLAUCOMA SCREENING Q2Y 12/27/2018 Allergies as of 2/12/2018  Review Complete On: 2/12/2018 By: Fabian Hoffman Severity Noted Reaction Type Reactions Egg  02/20/2014    Other (comments) Per Dr. Reynoso Nine note on 11/20/13: She cannot take the flu shot because of egg allergy Current Immunizations  Never Reviewed Name Date Pneumococcal Polysaccharide (PPSV-23) 11/3/2012 Not reviewed this visit You Were Diagnosed With   
  
 Codes Comments Pain with urination    -  Primary ICD-10-CM: R30.9 ICD-9-CM: 788.1 Diabetic polyneuropathy associated with diabetes mellitus due to underlying condition ([facility].)     ICD-10-CM: W69.29 
ICD-9-CM: 249.60, 357.2 Essential hypertension     ICD-10-CM: I10 
ICD-9-CM: 401.9 Vitals BP Pulse Temp Resp Height(growth percentile) Weight(growth percentile) 156/79 (BP 1 Location: Right arm, BP Patient Position: Sitting) 86 98 °F (36.7 °C) (Oral) 16 5' 2\" (1.575 m) 169 lb (76.7 kg) SpO2 BMI OB Status Smoking Status 98% 30.91 kg/m2 Hysterectomy Never Smoker Vitals History BMI and BSA Data Body Mass Index Body Surface Area 30.91 kg/m 2 1.83 m 2 Preferred Pharmacy Pharmacy Name Phone [address] [facility], [address] [place] [address] [phone] Your Updated Medication List  
  
   
This list is accurate as of: 2/12/18 12:03 PM.  Always use your most recent med list.  
  
  
  
  
 acetaminophen 500 mg tablet Commonly known as:  TYLENOL Take 500 mg by mouth every six (6) hours as needed for Pain or Fever. Artifi. Tear (Hypromellose)-PF 0.3 % Drop Apply 1 Drop to eye daily as needed. atorvastatin 40 mg tablet Commonly known as:  LIPITOR  
take 1 tablet by mouth once daily BD INSULIN PEN NEEDLE UF SHORT 31 gauge x 5/16\" Ndle Generic drug:  Insulin Needles (Disposable) USE AS DIRECTED TWICE DAILY Blood-Glucose Meter monitoring kit Use as directed to test blood sugar CALTRATE 600 + D PO Take 600 mg by mouth daily. CENTRUM SILVER PO Take 1 Tab by mouth daily. cyanocobalamin 1,000 mcg tablet Take 1,000 mcg by mouth daily. furosemide 20 mg tablet Commonly known as:  LASIX  
take 1 tablet by mouth once daily for edema  
  
 glucose blood VI test strips strip Commonly known as:  ONETOUCH ULTRA TEST Use as directed to test blood sugar TID HumaLOG KwikPen 100 unit/mL kwikpen Generic drug:  insulin lispro INJECT 6 UNITS BEFORE DINNER INCREASE TO 8 UNITS IF SUGAR IS OVER 200  
  
 insulin glargine 100 unit/mL (3 mL) Inpn Commonly known as:  LANTUS SOLOSTAR Use as directed based on endocrinology recommendations  
  
 lancets 28 gauge Misc Commonly known as:  FREESTYLE LANCETS Use as directed to test blood glucose TID  
  
 loperamide 1 mg/5 mL solution Commonly known as:  IMODIUM Take 1 mg by mouth three (3) times daily as needed for Diarrhea. OMEGA 3 FISH -1,200 mg Cpdr  
Generic drug:  Omega-3 Fatty Acids-Fish Oil Take 1 Tab by mouth daily. omeprazole 20 mg capsule Commonly known as:  PRILOSEC  
take 1 capsule by mouth once daily OXYGEN-AIR DELIVERY SYSTEMS  
3 L by Nasal route continuous. RESTASIS 0.05 % ophthalmic emulsion Generic drug:  cycloSPORINE  
instill 1 drop into both eyes twice a day SIMBRINZA 1-0.2 % Drps Generic drug:  brinzolamide-brimonidine Apply 1 Drop to eye two (2) times a day. patient reports use of eye drops in both eyes TAGRISSO 80 mg tablet Generic drug:  osimertinib Take 80 mg by mouth daily. trimethoprim-sulfamethoxazole 160-800 mg per tablet Commonly known as:  BACTRIM DS, SEPTRA DS Take 1 Tab by mouth two (2) times a day for 3 days. Indications: BACTERIAL URINARY TRACT INFECTION Prescriptions Sent to Pharmacy Refills  
 trimethoprim-sulfamethoxazole (BACTRIM DS, SEPTRA DS) 160-800 mg per tablet 0 Sig: Take 1 Tab by mouth two (2) times a day for 3 days. Indications: BACTERIAL URINARY TRACT INFECTION Class: Normal  
 Pharmacy: [address] [place] [address] Ph #: [phone] Route: Oral  
 Blood-Glucose Meter monitoring kit 0 Sig: Use as directed to test blood sugar Class: Normal  
 Pharmacy: [address] [place] [address] Ph #: [phone] We Performed the Following AMB POC URINALYSIS DIP STICK AUTO W/O MICRO [00329 CPT(R)] Follow-up Instructions Return in about 3 months (around 5/12/2018), or if symptoms worsen or fail to improve, for diabetes, hypertension. Patient Instructions It is okay to begin your insulin sliding scale at 20 points higher than you have been doing. I would rather allow for higher readings than hypoglycemia. Nutrition Tips for Diabetes: After Your Visit Your Care Instructions A healthy diet is important to manage diabetes.  It helps you lose weight (if you need to) and keep it off. It gives you the nutrition and energy your body needs and helps prevent heart disease. But a diet for diabetes does not mean that you have to eat special foods. You can eat what your family eats, including occasional sweets and other favorites. But you do have to pay attention to how often you eat and how much you eat of certain foods. The right plan for you will give you meals that help you keep your blood sugar at healthy levels. Try to eat a variety of foods and to spread carbohydrate throughout the day. Carbohydrate raises blood sugar higher and more quickly than any other nutrient does. Carbohydrate is found in sugar, breads and cereals, fruit, starchy vegetables such as potatoes and corn, and milk and yogurt. You may want to work with a dietitian or diabetes educator to help you plan meals and snacks. A dietitian or diabetes educator also can help you lose weight if that is one of your goals. The following tips can help you enjoy your meals and stay healthy. Follow-up care is a key part of your treatment and safety. Be sure to make and go to all appointments, and call your doctor if you are having problems. Its also a good idea to know your test results and keep a list of the medicines you take. How can you care for yourself at home? · Learn which foods have carbohydrate and how much carbohydrate to eat. A dietitian or diabetes educator can help you learn to keep track of how much carbohydrate you eat. · Spread carbohydrate throughout the day. Eat some carbohydrate at all meals, but do not eat too much at any one time. · Plan meals to include food from all the food groups. These are the food groups and some example portion sizes: ¨ Grains: 1 slice of bread (1 ounce), ½ cup of cooked cereal, and 1/3 cup of cooked pasta or rice. These have about 15 grams of carbohydrate in a serving.  Choose whole grains such as whole wheat bread or crackers, oatmeal, and brown rice more often than refined grains. ¨ Fruit: 1 small fresh fruit, such as an apple or orange; ½ of a banana; ½ cup of chopped, cooked, or canned fruit; ½ cup of fruit juice; 1 cup of melon or raspberries; and 2 tablespoons of dried fruit. These have about 15 grams of carbohydrate in a serving. ¨ Dairy: 1 cup of nonfat or low-fat milk and 2/3 cup of plain yogurt. These have about 15 grams of carbohydrate in a serving. ¨ Protein foods: Beef, chicken, turkey, fish, eggs, tofu, cheese, cottage cheese, and peanut butter. A serving size of meat is 3 ounces, which is about the size of a deck of cards. Examples of meat substitute serving sizes (equal to 1 ounce of meat) are 1/4 cup of cottage cheese, 1 egg, 1 tablespoon of peanut butter, and ½ cup of tofu. These have very little or no carbohydrate per serving. ¨ Vegetables: Starchy vegetables such as ½ cup of cooked dried beans, peas, potatoes, or corn have about 15 grams of carbohydrate. Nonstarchy vegetables have very little carbohydrate, such as 1 cup of raw leafy vegetables (such as spinach), ½ cup of other vegetables (cooked or chopped), and 3/4 cup of vegetable juice. · Use the plate format to plan meals. It is a good, quick way to make sure that you have a balanced meal. It also helps you spread carbohydrate throughout the day. You divide your plate by types of foods. Put vegetables on half the plate, meat or meat substitutes on one-quarter of the plate, and a grain or starchy vegetable (such as brown rice or a potato) in the final quarter of the plate. To this you can add a small piece of fruit and 1 cup of milk or yogurt, depending on how much carbohydrate you are supposed to eat at a meal. 
· Talk to your dietitian or diabetes educator about ways to add limited amounts of sweets into your meal plan. You can eat these foods now and then, as long as you include the amount of carbohydrate they have in your daily carbohydrate allowance. · If you drink alcohol, limit it to no more than 1 drink a day for women and 2 drinks a day for men. If you are pregnant, no amount of alcohol is known to be safe. · Protein, fat, and fiber do not raise blood sugar as much as carbohydrate does. If you eat a lot of these nutrients in a meal, your blood sugar will rise more slowly than it would otherwise. · Limit saturated fats, such as those from meat and dairy products. Try to replace it with monounsaturated fat, such as olive oil. This is a healthier choice because people who have diabetes are at higher-than-average risk of heart disease. But use a modest amount of olive oil. A tablespoon of olive oil has 14 grams of fat and 120 calories. · Exercise lowers blood sugar. If you take insulin by shots or pump, you can use less than you would if you were not exercising. Keep in mind that timing matters. If you exercise within 1 hour after a meal, your body may need less insulin for that meal than it would if you exercised 3 hours after the meal. Test your blood sugar to find out how exercise affects your need for insulin. · Exercise on most days of the week. Aim for at least 30 minutes. Exercise helps you stay at a healthy weight and helps your body use insulin. Walking is an easy way to get exercise. Gradually increase the amount you walk every day. You also may want to swim, bike, or do other activities. When you eat out · Learn to estimate the serving sizes of foods that have carbohydrate. If you measure food at home, it will be easier to estimate the amount in a serving of restaurant food. · If the meal you order has too much carbohydrate (such as potatoes, corn, or baked beans), ask to have a low-carbohydrate food instead. Ask for a salad or green vegetables. · If you use insulin, check your blood sugar before and after eating out to help you plan how much to eat in the future.  
· If you eat more carbohydrate at a meal than you had planned, take a walk or do other exercise. This will help lower your blood sugar. Where can you learn more? Go to [employer]be Enter E343 in the search box to learn more about \"Nutrition Tips for Diabetes: After Your Visit. \"  
© 2690-2041 Healthwise, Incorporated. Care instructions adapted under license by [employer] (which disclaims liability or warranty for this information). This care instruction is for use with your licensed healthcare professional. If you have questions about a medical condition or this instruction, always ask your healthcare professional. [address] any warranty or liability for your use of this information. Content Version: 11.3.450472; Current as of: June 4, 2014 Introducing [facility]! [facility]: 
Thank you for requesting a DataSync account. Our records indicate that you already have an active DataSync account. You can access your account anytime at [email or web address] Did you know that you can access your hospital and ER discharge instructions at any time in DataSync? You can also review all of your test results from your hospital stay or ER visit. Additional Information If you have questions, please visit the Frequently Asked Questions section of the DataSync website at [email or web address] Remember, DataSync is NOT to be used for urgent needs. For medical emergencies, dial 911. Now available from your iPhone and Android! Please provide this summary of care documentation to your next provider. Your primary care clinician is listed as Daniel Villagomez. If you have any questions after today's visit, please call [phone].

## 2018-02-15 LAB
BACTERIA SPEC CULT: ABNORMAL
BACTERIA SPEC CULT: ABNORMAL
SERVICE CMNT-IMP: ABNORMAL

## 2018-02-26 ENCOUNTER — TELEPHONE (OUTPATIENT)
Dept: INTERNAL MEDICINE CLINIC | Age: 83
End: 2018-02-26

## 2018-02-26 NOTE — TELEPHONE ENCOUNTER
[facility] called stating they received the fax that was sent today. States there in information missing. The section requesting last Face to Face or OV needs to be completed and faxed back.

## 2018-02-26 NOTE — TELEPHONE ENCOUNTER
Contacted pharmacy. Two patient Identifiers confirmed. Advised per Dr David Gama notes. They advised they would refax for completion. Will await paperwork.

## 2018-03-14 RX ORDER — INSULIN GLARGINE 100 [IU]/ML
INJECTION, SOLUTION SUBCUTANEOUS
Qty: 15 ML | Refills: 5 | Status: SHIPPED | OUTPATIENT
Start: 2018-03-14 | End: 2018-05-17 | Stop reason: SDUPTHER

## 2018-04-02 ENCOUNTER — TELEPHONE (OUTPATIENT)
Dept: INTERNAL MEDICINE CLINIC | Age: 83
End: 2018-04-02

## 2018-04-02 NOTE — TELEPHONE ENCOUNTER
Incomining from [address]. Two patient Identifiers confirmed. She stated she needed to know if there were any notes from January 2018 til now stating pt benefits form having 02. Advised I did not see that in the note but did see where pt  was on 02. Pt verbalized understanding.

## 2018-05-01 RX ORDER — INSULIN LISPRO 100 [IU]/ML
INJECTION, SOLUTION INTRAVENOUS; SUBCUTANEOUS
Qty: 5 PEN | Refills: 3 | Status: SHIPPED | OUTPATIENT
Start: 2018-05-01 | End: 2019-06-06 | Stop reason: SDUPTHER

## 2018-05-01 NOTE — TELEPHONE ENCOUNTER
Requested Prescriptions     Pending Prescriptions Disp Refills    insulin lispro (HUMALOG KWIKPEN INSULIN) 100 unit/mL Julita Meyer requesting 90 day fill

## 2018-05-10 RX ORDER — PEN NEEDLE, DIABETIC 30 GX3/16"
NEEDLE, DISPOSABLE MISCELLANEOUS
Qty: 200 PEN NEEDLE | Refills: 5 | Status: SHIPPED | OUTPATIENT
Start: 2018-05-10 | End: 2018-10-11 | Stop reason: SDUPTHER

## 2018-05-10 NOTE — TELEPHONE ENCOUNTER
Requested Prescriptions     Pending Prescriptions Disp Refills    Insulin Needles, Disposable, (BD ULTRA-FINE SHORT PEN NEEDLE) 31 gauge x 5/16\" ndle 200 Pen Needle 5

## 2018-05-17 ENCOUNTER — OFFICE VISIT (OUTPATIENT)
Dept: INTERNAL MEDICINE CLINIC | Age: 83
End: 2018-05-17

## 2018-05-17 VITALS
TEMPERATURE: 97.6 F | SYSTOLIC BLOOD PRESSURE: 131 MMHG | WEIGHT: 171.4 LBS | DIASTOLIC BLOOD PRESSURE: 74 MMHG | BODY MASS INDEX: 31.54 KG/M2 | HEART RATE: 80 BPM | HEIGHT: 62 IN | OXYGEN SATURATION: 99 % | RESPIRATION RATE: 18 BRPM

## 2018-05-17 DIAGNOSIS — I10 ESSENTIAL HYPERTENSION: ICD-10-CM

## 2018-05-17 DIAGNOSIS — Z79.4 TYPE 2 DIABETES MELLITUS WITHOUT COMPLICATION, WITH LONG-TERM CURRENT USE OF INSULIN (HCC): Primary | ICD-10-CM

## 2018-05-17 DIAGNOSIS — E11.9 TYPE 2 DIABETES MELLITUS WITHOUT COMPLICATION, WITH LONG-TERM CURRENT USE OF INSULIN (HCC): Primary | ICD-10-CM

## 2018-05-17 DIAGNOSIS — H61.22 IMPACTED CERUMEN OF LEFT EAR: ICD-10-CM

## 2018-05-17 PROBLEM — E11.21 TYPE 2 DIABETES WITH NEPHROPATHY (HCC): Status: ACTIVE | Noted: 2018-05-17

## 2018-05-17 RX ORDER — INSULIN GLARGINE 100 [IU]/ML
INJECTION, SOLUTION SUBCUTANEOUS
Qty: 15 ML | Refills: 5 | Status: SHIPPED | OUTPATIENT
Start: 2018-05-17 | End: 2018-10-11 | Stop reason: SDUPTHER

## 2018-05-17 NOTE — PATIENT INSTRUCTIONS
Earwax Blockage: Care Instructions  Your Care Instructions    Earwax is a natural substance that protects the ear canal. Normally, earwax drains from the ears and does not cause problems. Sometimes earwax builds up and hardens. Earwax blockage (also called cerumen impaction) can cause some loss of hearing and pain. When wax is tightly packed, you will need to have your doctor remove it. Follow-up care is a key part of your treatment and safety. Be sure to make and go to all appointments, and call your doctor if you are having problems. It's also a good idea to know your test results and keep a list of the medicines you take. How can you care for yourself at home? · Do not try to remove earwax with cotton swabs, fingers, or other objects. This can make the blockage worse and damage the eardrum. · If your doctor recommends that you try to remove earwax at home:  ¨ Soften and loosen the earwax with warm mineral oil. You also can try hydrogen peroxide mixed with an equal amount of room temperature water. Place 2 drops of the fluid, warmed to body temperature, in the ear two times a day for up to 5 days. ¨ Once the wax is loose and soft, all that is usually needed to remove it from the ear canal is a gentle, warm shower. Direct the water into the ear, then tip your head to let the earwax drain out. Dry your ear thoroughly with a hair dryer set on low. Hold the dryer several inches from your ear. ¨ If the warm mineral oil and shower do not work, use an over-the-counter wax softener followed by gentle flushing with an ear syringe each night for a week or two. Make sure the flushing solution is body temperature. Cool or hot fluids in the ear can cause dizziness. When should you call for help? Call your doctor now or seek immediate medical care if:  ? · Pus or blood drains from your ear. ? · Your ears are ringing or feel full. ? · You have a loss of hearing. ? Watch closely for changes in your health, and be sure to contact your doctor if:  ? · You have pain or reduced hearing after 1 week of home treatment. ? · You have any new symptoms, such as nausea or balance problems. Where can you learn more? Go to http://quirino-zeinab.info/. Enter J646 in the search box to learn more about \"Earwax Blockage: Care Instructions. \"  Current as of: March 20, 2017  Content Version: 11.4  © 2169-6090 [employer]. Care instructions adapted under license by [employer] (which disclaims liability or warranty for this information). If you have questions about a medical condition or this instruction, always ask your healthcare professional. [place] [address] any warranty or liability for your use of this information.

## 2018-05-17 NOTE — MR AVS SNAPSHOT
[address] [place] [address] 
 
 
 [address] [facility] [id number] 
[phone] Patient: [address] [facility] 59  N MRN: [id number] POD:9/8/8173 Visit Information Date & Time Provider Department Dept. Phone Encounter #  
 5/17/2018 10:30 AM Olan Landau, [address] [phone] [id number] Follow-up Instructions Return in about 3 months (around 8/17/2018), or if symptoms worsen or fail to improve, for diabetes, hypertension. Upcoming Health Maintenance Date Due DTaP/Tdap/Td series (1 - Tdap) 3/9/1951 ZOSTER VACCINE AGE 60> 1/9/1990 Bone Densitometry (Dexa) Screening 3/9/1995 EYE EXAM RETINAL OR DILATED Q1 12/27/2017 MICROALBUMIN Q1 6/1/2018 MEDICARE YEARLY EXAM 5/23/2018 FOOT EXAM Q1 11/9/2018 LIPID PANEL Q1 11/9/2018 HEMOGLOBIN A1C Q6M 11/17/2018 GLAUCOMA SCREENING Q2Y 12/27/2018 Allergies as of 5/17/2018  Review Complete On: 5/17/2018 By: Elza Jackson Severity Noted Reaction Type Reactions Egg  02/20/2014    Other (comments) Per Dr. Jasmyn Vera note on 11/20/13: She cannot take the flu shot because of egg allergy Current Immunizations  Never Reviewed Name Date Pneumococcal Polysaccharide (PPSV-23) 11/3/2012 Not reviewed this visit You Were Diagnosed With   
  
 Codes Comments Type 2 diabetes mellitus without complication, with long-term current use of insulin (HCC)    -  Primary ICD-10-CM: E11.9, Z79.4 ICD-9-CM: 250.00, V58.67 Essential hypertension     ICD-10-CM: I10 
ICD-9-CM: 401.9 Impacted cerumen of left ear     ICD-10-CM: H61.22 
ICD-9-CM: 380.4 Vitals BP Pulse Temp Resp Height(growth percentile) Weight(growth percentile) 131/74 (BP 1 Location: Right arm, BP Patient Position: Sitting) 80 97.6 °F (36.4 °C) (Oral) 18 5' 2\" (1.575 m) 171 lb 6.4 oz (77.7 kg) SpO2 BMI OB Status Smoking Status 99% 31.35 kg/m2 Hysterectomy Never Smoker BMI and BSA Data Body Mass Index Body Surface Area  
 31.35 kg/m 2 1.84 m 2 Preferred Pharmacy Pharmacy Name Phone Tessie Carpenter, [facility] [phone] Your Updated Medication List  
  
   
This list is accurate as of 5/17/18 11:06 AM.  Always use your most recent med list.  
  
  
  
  
 acetaminophen 500 mg tablet Commonly known as:  TYLENOL Take 500 mg by mouth every six (6) hours as needed for Pain or Fever. Artifi. Tear (Hypromellose)-PF 0.3 % Drop Apply 1 Drop to eye daily as needed. atorvastatin 40 mg tablet Commonly known as:  LIPITOR  
take 1 tablet by mouth once daily Blood-Glucose Meter monitoring kit Use as directed to test blood sugar CALTRATE 600 + D PO Take 600 mg by mouth daily. CENTRUM SILVER PO Take 1 Tab by mouth daily. cyanocobalamin 1,000 mcg tablet Take 1,000 mcg by mouth daily. furosemide 20 mg tablet Commonly known as:  LASIX  
take 1 tablet by mouth once daily for edema  
  
 glucose blood VI test strips strip Commonly known as:  ONETOUCH ULTRA TEST Use as directed to test blood sugar TID  
  
 insulin glargine 100 unit/mL (3 mL) Inpn Commonly known as:  LANTUS SOLOSTAR U-100 INSULIN  
inject 20 units subcutaneously at bedtime  
  
 insulin lispro 100 unit/mL kwikpen Commonly known as:  HumaLOG KwikPen Insulin BELOW 160 TAKING 3 UNITS ABOVE 160 TAKING 1 UNITS ABOVE 200 TAKING 2 UNITS Insulin Needles (Disposable) 31 gauge x 5/16\" Ndle Commonly known as:  BD ULTRA-FINE SHORT PEN NEEDLE  
USE AS DIRECTED TWICE DAILY  
  
 lancets 28 gauge Misc Commonly known as:  FREESTYLE LANCETS Use as directed to test blood glucose TID  
  
 loperamide 1 mg/5 mL solution Commonly known as:  IMODIUM Take 1 mg by mouth three (3) times daily as needed for Diarrhea. OMEGA 3 FISH -1,200 mg Cpdr  
Generic drug:  Omega-3 Fatty Acids-Fish Oil Take 1 Tab by mouth daily. omeprazole 20 mg capsule Commonly known as:  PRILOSEC  
take 1 capsule by mouth once daily OXYGEN-AIR DELIVERY SYSTEMS  
3 L by Nasal route continuous. RESTASIS 0.05 % ophthalmic emulsion Generic drug:  cycloSPORINE  
instill 1 drop into both eyes twice a day SIMBRINZA 1-0.2 % Drps Generic drug:  brinzolamide-brimonidine Apply 1 Drop to eye two (2) times a day. patient reports use of eye drops in both eyes TAGRISSO 80 mg tablet Generic drug:  osimertinib Take 80 mg by mouth daily. Prescriptions Sent to Pharmacy Refills  
 insulin glargine (LANTUS SOLOSTAR U-100 INSULIN) 100 unit/mL (3 mL) inpn 5 Sig: inject 20 units subcutaneously at bedtime Class: Normal  
 Pharmacy: [address] [place] [address], [address] [place] [address] #: [phone] We Performed the Following AMB POC HEMOGLOBIN A1C [25757 CPT(R)] REMOVAL IMPACTED CERUMEN IRRIGATION/LVG UNILAT [id number] CPT(R)] Follow-up Instructions Return in about 3 months (around 8/17/2018), or if symptoms worsen or fail to improve, for diabetes, hypertension. To-Do List   
 05/17/2018 Lab:  MICROALBUMIN, UR, RAND W/ MICROALB/CREAT RATIO Patient Instructions Earwax Blockage: Care Instructions Your Care Instructions Earwax is a natural substance that protects the ear canal. Normally, earwax drains from the ears and does not cause problems. Sometimes earwax builds up and hardens. Earwax blockage (also called cerumen impaction) can cause some loss of hearing and pain. When wax is tightly packed, you will need to have your doctor remove it. Follow-up care is a key part of your treatment and safety.  Be sure to make and go to all appointments, and call your doctor if you are having problems. It's also a good idea to know your test results and keep a list of the medicines you take. How can you care for yourself at home? · Do not try to remove earwax with cotton swabs, fingers, or other objects. This can make the blockage worse and damage the eardrum. · If your doctor recommends that you try to remove earwax at home: ¨ Soften and loosen the earwax with warm mineral oil. You also can try hydrogen peroxide mixed with an equal amount of room temperature water. Place 2 drops of the fluid, warmed to body temperature, in the ear two times a day for up to 5 days. ¨ Once the wax is loose and soft, all that is usually needed to remove it from the ear canal is a gentle, warm shower. Direct the water into the ear, then tip your head to let the earwax drain out. Dry your ear thoroughly with a hair dryer set on low. Hold the dryer several inches from your ear. ¨ If the warm mineral oil and shower do not work, use an over-the-counter wax softener followed by gentle flushing with an ear syringe each night for a week or two. Make sure the flushing solution is body temperature. Cool or hot fluids in the ear can cause dizziness. When should you call for help? Call your doctor now or seek immediate medical care if: 
? · Pus or blood drains from your ear. ? · Your ears are ringing or feel full. ? · You have a loss of hearing. ? Watch closely for changes in your health, and be sure to contact your doctor if: 
? · You have pain or reduced hearing after 1 week of home treatment. ? · You have any new symptoms, such as nausea or balance problems. Where can you learn more? Go to http://quirino-zeinab.info/. Enter U454 in the search box to learn more about \"Earwax Blockage: Care Instructions. \" Current as of: March 20, 2017 Content Version: 11.4 © 1263-4635 [employer], [employer].  Care instructions adapted under license by [address] (which disclaims liability or warranty for this information). If you have questions about a medical condition or this instruction, always ask your healthcare professional. [address] any warranty or liability for your use of this information. Introducing [facility]! Dear Vi Salinas: 
Thank you for requesting a "[employer] account. Our records indicate that you already have an active "[employer] account. You can access your account anytime at [email or web address] Did you know that you can access your hospital and ER discharge instructions at any time in "[employer]? You can also review all of your test results from your hospital stay or ER visit. Additional Information If you have questions, please visit the Frequently Asked Questions section of the "[employer] website at [email or web address] Remember, "[employer] is NOT to be used for urgent needs. For medical emergencies, dial 911. Now available from your iPhone and Android! Please provide this summary of care documentation to your next provider. Your primary care clinician is listed as Daniel Villagomez. If you have any questions after today's visit, please call [phone].

## 2018-05-17 NOTE — PROGRESS NOTES
ROOM # 16  Identified pt with two pt identifiers(name and ). Reviewed record in preparation for visit and have obtained necessary documentation. Chief Complaint   Patient presents with    Follow-up    Wax in Ear    Burn     burn to fingers      [id number] [facility] 59  N preferred language for health care discussion is english/other. Is the patient using any DME equipment during OV? Arabella Block is due for:  Health Maintenance Due   Topic    DTaP/Tdap/Td series (1 - Tdap)    ZOSTER VACCINE AGE 60>     Bone Densitometry (Dexa) Screening     EYE EXAM RETINAL OR DILATED Q1     HEMOGLOBIN A1C Q6M     MICROALBUMIN Q1      Health Maintenance reviewed and discussed per provider  Please order/place referral if appropriate. Advance Directive:  1. Do you have an advance directive in place? Patient Reply: NO    2. If not, would you like material regarding how to put one in place? NO    Coordination of Care:  1. Have you been to the ER, urgent care clinic since your last visit? Hospitalized since your last visit? NO    2. Have you seen or consulted any other health care providers outside of the [address] [place] [address] since your last visit? Include any pap smears or colon screening. NO    Patient is accompanied by son I have received verbal consent from [id number] [facility] 59  N to discuss any/all medical information while they are present in the room.     Learning Assessment:  Learning Assessment 2013   PRIMARY LEARNER Patient Patient Patient Patient   HIGHEST LEVEL OF EDUCATION - PRIMARY LEARNER  2 YEARS OF COLLEGE 2 YEARS OF COLLEGE 2 YEARS OF COLLEGE -   BARRIERS PRIMARY LEARNER - NONE - -   CO-LEARNER CAREGIVER - - Yes -   [address] - - > 4 [facility] ENGLISH ENGLISH ENGLISH ENGLISH   PRIMARY LANGUAGE CO-LEARNER - - ENGLISH -    NEED - No No -   LEARNER PREFERENCE PRIMARY READING DEMONSTRATION OTHER (COMMENT) DEMONSTRATION     VIDEOS - DEMONSTRATION -   LEARNER PREFERENCE CO-LEARNER - - DEMONSTRATION -   LEARNING SPECIAL TOPICS - no no -   ANSWERED BY patient patient patient patient   RELATIONSHIP SELF SELF - SELF     Depression Screening:  PHQ over the last two weeks 5/17/2018 5/22/2017 4/17/2017 4/12/2017 3/30/2017 2/6/2017 11/21/2016   Little interest or pleasure in doing things Not at all Not at all Not at all Not at all Several days Not at all Not at all   Feeling down, depressed or hopeless Not at all Not at all Not at all Not at all Not at all Not at all Not at all   Total Score PHQ 2 0 0 0 0 1 0 0     Abuse Screening:  Abuse Screening Questionnaire 5/22/2017 6/1/2015   Do you ever feel afraid of your partner? N N   Are you in a relationship with someone who physically or mentally threatens you? N N   Is it safe for you to go home? Y Y     Fall Risk  Fall Risk Assessment, last 12 mths 5/17/2018 2/12/2018 5/22/2017 4/17/2017 4/12/2017 3/30/2017 2/6/2017   Able to walk? Yes Yes Yes Yes Yes Yes Yes   Fall in past 12 months?  No No No No No No No   Fall with injury? - - - - - - -

## 2018-05-17 NOTE — PROGRESS NOTES
HISTORY OF PRESENT ILLNESS  Dusty Ram is a 80 y.o. female. HPI Comments: 79 yo female here for f/u of HTN, DM. Doing well. BP controlled. DM has been doing well on home monitoring. Has not had recent hypoglycemia. Has decreased lantus to 15u lately as she is sleeping better and going longer before breakfast.  Notes some decreased hearing, h/o cerumen. Review of Systems   Constitutional: Negative for chills, fever and weight loss. HENT: Negative for congestion and ear pain. Eyes: Negative for blurred vision and pain. Respiratory: Positive for shortness of breath (intermittent, with exertion, stable with O2 as needed). Negative for cough. Cardiovascular: Negative for chest pain, palpitations and leg swelling. Gastrointestinal: Negative for nausea and vomiting. Genitourinary: Negative for frequency and urgency. Musculoskeletal: Negative for joint pain and myalgias. Skin: Negative for itching and rash. Neurological: Negative for dizziness, tingling and headaches. Psychiatric/Behavioral: Negative for depression. The patient is not nervous/anxious.       Past Medical History:   Diagnosis Date    [facility] ([facility]     Diabetes ([facility]     GERD (gastroesophageal reflux disease)     Glaucoma     Hypercholesterolemia     Hypertension     [facility]     on tarciva     Current Outpatient Prescriptions on File Prior to Visit   Medication Sig Dispense Refill    Insulin Needles, Disposable, (BD ULTRA-FINE SHORT PEN NEEDLE) 31 gauge x 5/16\" ndle USE AS DIRECTED TWICE DAILY 200 Pen Needle 5    insulin lispro (HUMALOG KWIKPEN INSULIN) 100 unit/mL kwikpen BELOW 160 TAKING 3 UNITS ABOVE 160 TAKING 1 UNITS ABOVE 200 TAKING 2 UNITS 5 Pen 3    LANTUS SOLOSTAR U-100 INSULIN 100 unit/mL (3 mL) inpn inject 20 units subcutaneously at bedtime 15 mL 5    Blood-Glucose Meter monitoring kit Use as directed to test blood sugar 1 Kit 0    furosemide (LASIX) 20 mg tablet take 1 tablet by mouth once daily for edema 30 Tab 5    glucose blood VI test strips (ONETOUCH ULTRA TEST) strip Use as directed to test blood sugar  Strip 5    lancets (FREESTYLE LANCETS) 28 gauge misc Use as directed to test blood glucose  Lancet 5    omeprazole (PRILOSEC) 20 mg capsule take 1 capsule by mouth once daily 90 Cap 5    atorvastatin (LIPITOR) 40 mg tablet take 1 tablet by mouth once daily 60 Tab 5    FOLIC ACID/MULTIVIT-MIN/LUTEIN (CENTRUM SILVER PO) Take 1 Tab by mouth daily.  cyanocobalamin 1,000 mcg tablet Take 1,000 mcg by mouth daily.  CALCIUM CARBONATE/VITAMIN D3 (CALTRATE 600 + D PO) Take 600 mg by mouth daily.  Omega-3 Fatty Acids-Fish Oil (OMEGA 3 FISH OIL) 684-1,200 mg cpDR Take 1 Tab by mouth daily.  loperamide (IMODIUM) 1 mg/5 mL solution Take 1 mg by mouth three (3) times daily as needed for Diarrhea.  OXYGEN-AIR DELIVERY SYSTEMS 3 L by Nasal route continuous.  acetaminophen (TYLENOL) 500 mg tablet Take 500 mg by mouth every six (6) hours as needed for Pain or Fever.  Artifi. Tear, Hypromellose,-PF 0.3 % drop Apply 1 Drop to eye daily as needed.  RESTASIS 0.05 % ophthalmic emulsion instill 1 drop into both eyes twice a day  0    TAGRISSO 80 mg tablet Take 80 mg by mouth daily.  brinzolamide-brimonidine (SIMBRINZA) 1-0.2 % drps Apply 1 Drop to eye two (2) times a day. patient reports use of eye drops in both eyes       No current facility-administered medications on file prior to visit. Physical Exam   Constitutional: She appears well-developed and well-nourished. No distress. /74 (BP 1 Location: Right arm, BP Patient Position: Sitting)  Pulse 80  Temp 97.6 °F (36.4 °C) (Oral)   Resp 18  Ht 5' 2\" (1.575 m)  Wt 171 lb 6.4 oz (77.7 kg)  SpO2 99%  BMI 31.35 kg/m2     HENT:   Right Ear: Tympanic membrane and ear canal normal.   + cerumen impaction on L   Eyes: EOM are normal. Right eye exhibits no discharge.  Left eye exhibits no discharge. No scleral icterus. Neck: Neck supple. Cardiovascular: Normal rate, regular rhythm and normal heart sounds. Exam reveals no gallop and no friction rub. No murmur heard. Pulmonary/Chest: Effort normal and breath sounds normal. No respiratory distress. She has no wheezes. She has no rales. Musculoskeletal: She exhibits no edema or tenderness. Lymphadenopathy:     She has no cervical adenopathy. Neurological: She is alert. She exhibits normal muscle tone. Skin: Skin is warm and dry. Psychiatric: She has a normal mood and affect. Lab Results   Component Value Date/Time    Hemoglobin A1c 6.4 (H) 11/09/2017 11:19 AM    Hemoglobin A1c (POC) 6.7 01/08/2016 11:30 AM     Lab Results   Component Value Date/Time    Sodium 142 11/09/2017 11:19 AM    Potassium 5.0 11/09/2017 11:19 AM    Chloride 107 11/09/2017 11:19 AM    CO2 26 11/09/2017 11:19 AM    Anion gap 9 11/09/2017 11:19 AM    Glucose 102 (H) 11/09/2017 11:19 AM    BUN 29 (H) 11/09/2017 11:19 AM    Creatinine 1.85 (H) 11/09/2017 11:19 AM    BUN/Creatinine ratio 16 11/09/2017 11:19 AM    GFR est AA 31 (L) 11/09/2017 11:19 AM    GFR est non-AA 26 (L) 11/09/2017 11:19 AM    Calcium 9.1 11/09/2017 11:19 AM    Bilirubin, total 0.2 11/09/2017 11:19 AM    AST (SGOT) 48 (H) 11/09/2017 11:19 AM    Alk. phosphatase 81 11/09/2017 11:19 AM    Protein, total 7.4 11/09/2017 11:19 AM    Albumin 3.6 11/09/2017 11:19 AM    Globulin 3.8 11/09/2017 11:19 AM    A-G Ratio 0.9 11/09/2017 11:19 AM    ALT (SGPT) 63 (H) 11/09/2017 11:19 AM     Lab Results   Component Value Date/Time    Microalbumin/Creat ratio (mg/g creat) 66 (H) 06/01/2017 01:53 PM    Microalbumin,urine random 9.80 (H) 06/01/2017 01:53 PM     ASSESSMENT and PLAN    ICD-10-CM ICD-9-CM    1. Type 2 diabetes mellitus without complication, with long-term current use of insulin (HCC) E11.9 250.00 AMB POC HEMOGLOBIN A1C    Z79.4 V58.67 MICROALBUMIN, UR, RAND W/ MICROALB/CREAT RATIO   2.  Essential hypertension I10 401.9    3. Impacted cerumen of left ear H61.22 380.4 REMOVAL IMPACTED CERUMEN IRRIGATION/LVG UNILAT     A1c very well controlled. Discussed decreasing insulin if needed to avoid hypoglycemia. BP stable. Cerumen irrigated.

## 2018-06-19 ENCOUNTER — HOSPITAL ENCOUNTER (OUTPATIENT)
Dept: LAB | Age: 83
Discharge: HOME | End: 2018-06-19
Payer: MEDICARE

## 2018-06-19 ENCOUNTER — OFFICE VISIT (OUTPATIENT)
Dept: INTERNAL MEDICINE CLINIC | Age: 83
End: 2018-06-19

## 2018-06-19 VITALS
SYSTOLIC BLOOD PRESSURE: 147 MMHG | WEIGHT: 171 LBS | DIASTOLIC BLOOD PRESSURE: 70 MMHG | OXYGEN SATURATION: 100 % | BODY MASS INDEX: 31.47 KG/M2 | TEMPERATURE: 96 F | HEART RATE: 77 BPM | HEIGHT: 62 IN | RESPIRATION RATE: 22 BRPM

## 2018-06-19 DIAGNOSIS — R31.9 URINARY TRACT INFECTION WITH HEMATURIA, SITE UNSPECIFIED: ICD-10-CM

## 2018-06-19 DIAGNOSIS — N39.0 URINARY TRACT INFECTION WITH HEMATURIA, SITE UNSPECIFIED: ICD-10-CM

## 2018-06-19 DIAGNOSIS — N39.0 URINARY TRACT INFECTION WITH HEMATURIA, SITE UNSPECIFIED: Primary | ICD-10-CM

## 2018-06-19 DIAGNOSIS — R31.9 URINARY TRACT INFECTION WITH HEMATURIA, SITE UNSPECIFIED: Primary | ICD-10-CM

## 2018-06-19 PROCEDURE — 87077 CULTURE AEROBIC IDENTIFY: CPT | Performed by: INTERNAL MEDICINE

## 2018-06-19 PROCEDURE — 87086 URINE CULTURE/COLONY COUNT: CPT | Performed by: INTERNAL MEDICINE

## 2018-06-19 PROCEDURE — 87186 SC STD MICRODIL/AGAR DIL: CPT | Performed by: INTERNAL MEDICINE

## 2018-06-19 RX ORDER — SULFAMETHOXAZOLE AND TRIMETHOPRIM 800; 160 MG/1; MG/1
1 TABLET ORAL 2 TIMES DAILY
Qty: 6 TAB | Refills: 0 | Status: SHIPPED | OUTPATIENT
Start: 2018-06-19 | End: 2018-06-22

## 2018-06-19 RX ORDER — SULFAMETHOXAZOLE AND TRIMETHOPRIM 800; 160 MG/1; MG/1
1 TABLET ORAL 2 TIMES DAILY
Qty: 6 TAB | Refills: 0 | Status: SHIPPED | OUTPATIENT
Start: 2018-06-19 | End: 2018-06-19 | Stop reason: SDUPTHER

## 2018-06-19 NOTE — PROGRESS NOTES
ROOM # 1  Identified pt with two pt identifiers(name and ). Reviewed record in preparation for visit and have obtained necessary documentation. Chief Complaint   Patient presents with    Urinary Pain      [id number] [facility] 59  N preferred language for health care discussion is english/other. Is the patient using any DME equipment during OV? Arabella Block is due for:  Health Maintenance Due   Topic    DTaP/Tdap/Td series (1 - Tdap)    ZOSTER VACCINE AGE 60>     Bone Densitometry (Dexa) Screening     MEDICARE YEARLY EXAM     MICROALBUMIN Q1      Health Maintenance reviewed and discussed per provider  Please order/place referral if appropriate. Advance Directive:  1. Do you have an advance directive in place? Patient Reply: NO    2. If not, would you like material regarding how to put one in place? NO    Coordination of Care:  1. Have you been to the ER, urgent care clinic since your last visit? Hospitalized since your last visit? NO    2. Have you seen or consulted any other health care providers outside of the [address] [place] [address] since your last visit? Include any pap smears or colon screening. NO    Patient is accompanied by son I have received verbal consent from [id number] [facility] 59  N to discuss any/all medical information while they are present in the room.     Learning Assessment:  Learning Assessment 2013   PRIMARY LEARNER Patient Patient Patient Patient   HIGHEST LEVEL OF EDUCATION - PRIMARY LEARNER  2 YEARS OF COLLEGE 2 YEARS OF COLLEGE 2 YEARS OF COLLEGE -   BARRIERS PRIMARY LEARNER - NONE - -   [address] - - > 4 YEARS [address] ENGLISH ENGLISH ENGLISH   PRIMARY LANGUAGE CO-LEARNER - - ENGLISH -    NEED - No No -   LEARNER PREFERENCE PRIMARY READING DEMONSTRATION OTHER (COMMENT) DEMONSTRATION     VIDEOS - DEMONSTRATION -   LEARNER PREFERENCE CO-LEARNER - - DEMONSTRATION -   LEARNING SPECIAL TOPICS - no no -   ANSWERED BY patient patient patient patient   RELATIONSHIP SELF SELF - SELF     Depression Screening:  PHQ over the last two weeks 6/19/2018 5/17/2018 5/22/2017 4/17/2017 4/12/2017 3/30/2017 2/6/2017   Little interest or pleasure in doing things Not at all Not at all Not at all Not at all Not at all Several days Not at all   Feeling down, depressed or hopeless Not at all Not at all Not at all Not at all Not at all Not at all Not at all   Total Score PHQ 2 0 0 0 0 0 1 0     Abuse Screening:  Abuse Screening Questionnaire 5/22/2017 6/1/2015   Do you ever feel afraid of your partner? N N   Are you in a relationship with someone who physically or mentally threatens you? N N   Is it safe for you to go home? Y Y     Fall Risk  Fall Risk Assessment, last 12 mths 6/19/2018 5/17/2018 2/12/2018 5/22/2017 4/17/2017 4/12/2017 3/30/2017   Able to walk? Yes Yes Yes Yes Yes Yes Yes   Fall in past 12 months?  No No No No No No No   Fall with injury? - - - - - - -

## 2018-06-19 NOTE — PROGRESS NOTES
HISTORY OF PRESENT ILLNESS  Ruel Knight is a 80 y.o. female. HPI Comments: 81 yo female here for evaluation of dysuria x 2 days. Has been trying to treat with cranberry juice. + increased frequency. No fevers, chills, back pain. Last UTI FEB. Urinary Pain    Associated symptoms include hematuria ('pink tinge', no clots). Pertinent negatives include no chills, no nausea, no vomiting, no frequency, no urgency and no flank pain. Review of Systems   Constitutional: Negative for chills, fever and weight loss. HENT: Negative for congestion and ear pain. Eyes: Negative for blurred vision and pain. Respiratory: Negative for cough and shortness of breath. Cardiovascular: Negative for chest pain, palpitations and leg swelling. Gastrointestinal: Negative for nausea and vomiting. Genitourinary: Positive for dysuria and hematuria ('pink tinge', no clots). Negative for flank pain, frequency and urgency. Musculoskeletal: Negative for joint pain and myalgias. Skin: Negative for itching and rash. Neurological: Negative for dizziness, tingling and headaches. Psychiatric/Behavioral: Negative for depression. The patient is not nervous/anxious.       Past Medical History:   Diagnosis Date    [facility] ([facility]     Diabetes ([facility]     GERD (gastroesophageal reflux disease)     Glaucoma     Hypercholesterolemia     Hypertension     [facility]     on tarciva     Current Outpatient Prescriptions on File Prior to Visit   Medication Sig Dispense Refill    insulin glargine (LANTUS SOLOSTAR U-100 INSULIN) 100 unit/mL (3 mL) inpn inject 20 units subcutaneously at bedtime 15 mL 5    Insulin Needles, Disposable, (BD ULTRA-FINE SHORT PEN NEEDLE) 31 gauge x 5/16\" ndle USE AS DIRECTED TWICE DAILY 200 Pen Needle 5    insulin lispro (HUMALOG KWIKPEN INSULIN) 100 unit/mL kwikpen BELOW 160 TAKING 3 UNITS ABOVE 160 TAKING 1 UNITS ABOVE 200 TAKING 2 UNITS 5 Pen 3    Blood-Glucose Meter monitoring kit Use as directed to test blood sugar 1 Kit 0    furosemide (LASIX) 20 mg tablet take 1 tablet by mouth once daily for edema 30 Tab 5    glucose blood VI test strips (ONETOUCH ULTRA TEST) strip Use as directed to test blood sugar  Strip 5    lancets (FREESTYLE LANCETS) 28 gauge misc Use as directed to test blood glucose  Lancet 5    omeprazole (PRILOSEC) 20 mg capsule take 1 capsule by mouth once daily 90 Cap 5    atorvastatin (LIPITOR) 40 mg tablet take 1 tablet by mouth once daily 60 Tab 5    FOLIC ACID/MULTIVIT-MIN/LUTEIN (CENTRUM SILVER PO) Take 1 Tab by mouth daily.  cyanocobalamin 1,000 mcg tablet Take 1,000 mcg by mouth daily.  CALCIUM CARBONATE/VITAMIN D3 (CALTRATE 600 + D PO) Take 600 mg by mouth daily.  Omega-3 Fatty Acids-Fish Oil (OMEGA 3 FISH OIL) 684-1,200 mg cpDR Take 1 Tab by mouth daily.  loperamide (IMODIUM) 1 mg/5 mL solution Take 1 mg by mouth three (3) times daily as needed for Diarrhea.  OXYGEN-AIR DELIVERY SYSTEMS 3 L by Nasal route continuous.  acetaminophen (TYLENOL) 500 mg tablet Take 500 mg by mouth every six (6) hours as needed for Pain or Fever.  Artifi. Tear, Hypromellose,-PF 0.3 % drop Apply 1 Drop to eye daily as needed.  RESTASIS 0.05 % ophthalmic emulsion instill 1 drop into both eyes twice a day  0    TAGRISSO 80 mg tablet Take 80 mg by mouth daily.  brinzolamide-brimonidine (SIMBRINZA) 1-0.2 % drps Apply 1 Drop to eye two (2) times a day. patient reports use of eye drops in both eyes       No current facility-administered medications on file prior to visit. Physical Exam   Constitutional: She appears well-developed and well-nourished. No distress. /70 (BP 1 Location: Left arm, BP Patient Position: Sitting)  Pulse 77  Temp 96 °F (35.6 °C) (Oral)   Resp 22  Ht 5' 2\" (1.575 m)  Wt 171 lb (77.6 kg)  SpO2 100%  BMI 31.28 kg/m2     Eyes: EOM are normal. Right eye exhibits no discharge.  Left eye exhibits no discharge. No scleral icterus. Neck: Neck supple. Cardiovascular: Normal rate, regular rhythm and normal heart sounds. Exam reveals no gallop and no friction rub. No murmur heard. Pulmonary/Chest: Effort normal and breath sounds normal. No respiratory distress. She has no wheezes. She has no rales. Abdominal: Soft. She exhibits no distension. There is no tenderness. There is no rebound. Musculoskeletal: She exhibits no edema or tenderness. Lymphadenopathy:     She has no cervical adenopathy. Neurological: She is alert. She exhibits normal muscle tone. Skin: Skin is warm and dry. Psychiatric: She has a normal mood and affect. Lab Results   Component Value Date/Time    Sodium 142 11/09/2017 11:19 AM    Potassium 5.0 11/09/2017 11:19 AM    Chloride 107 11/09/2017 11:19 AM    CO2 26 11/09/2017 11:19 AM    Anion gap 9 11/09/2017 11:19 AM    Glucose 102 (H) 11/09/2017 11:19 AM    BUN 29 (H) 11/09/2017 11:19 AM    Creatinine 1.85 (H) 11/09/2017 11:19 AM    BUN/Creatinine ratio 16 11/09/2017 11:19 AM    GFR est AA 31 (L) 11/09/2017 11:19 AM    GFR est non-AA 26 (L) 11/09/2017 11:19 AM    Calcium 9.1 11/09/2017 11:19 AM    Bilirubin, total 0.2 11/09/2017 11:19 AM    AST (SGOT) 48 (H) 11/09/2017 11:19 AM    Alk. phosphatase 81 11/09/2017 11:19 AM    Protein, total 7.4 11/09/2017 11:19 AM    Albumin 3.6 11/09/2017 11:19 AM    Globulin 3.8 11/09/2017 11:19 AM    A-G Ratio 0.9 11/09/2017 11:19 AM    ALT (SGPT) 63 (H) 11/09/2017 11:19 AM     ASSESSMENT and PLAN    ICD-10-CM ICD-9-CM    1. Urinary tract infection with hematuria, site unspecified N39.0 599.0     R31.9       Will treat with course of bactrim.  Can repeat UA in one month to assess for resolution of hematuria (? Related to UTI vs known stones on recent PET - no bladder activity noted on this)

## 2018-06-19 NOTE — MR AVS SNAPSHOT
[facility] 
 
 
 [address] [id number] 
[phone] [address]: [address]  N MRN: [id number] BIBIANA:[id number] Visit Information Date & Time Provider Department Dept. Phone Encounter #  
 6/19/2018  2:15 PM [phone] Your Appointments 8/17/2018  9:15 AM  
Office Visit with MD [facility]) Appt Note: 3 month f/u combo clinic  
 [address]  
  
   
 [address] [facility] Upcoming Health Maintenance Date Due DTaP/Tdap/Td series (1 - Tdap) 3/9/1951 ZOSTER VACCINE AGE 60> 1/9/1990 Bone Densitometry (Dexa) Screening 3/9/1995 MEDICARE YEARLY EXAM 5/23/2018 MICROALBUMIN Q1 6/1/2018 FOOT EXAM Q1 11/9/2018 LIPID PANEL Q1 11/9/2018 HEMOGLOBIN A1C Q6M 11/17/2018 EYE EXAM RETINAL OR DILATED Q1 5/3/2019 GLAUCOMA SCREENING Q2Y 5/3/2020 Allergies as of 6/19/2018  Review Complete On: 6/19/2018 By: Sandi Acevedo Severity Noted Reaction Type Reactions Egg  02/20/2014    Other (comments) Per Dr. 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   
  
 Codes Comments Urinary tract infection with hematuria, site unspecified    -  Primary ICD-10-CM: N39.0, R31.9 ICD-9-CM: 599.0 Vitals BP Pulse Temp Resp Height(growth percentile) Weight(growth percentile) 147/70 (BP 1 Location: Left arm, BP Patient Position: Sitting) 77 96 °F (35.6 °C) (Oral) 22 5' 2\" (1.575 m) 171 lb (77.6 kg) SpO2 BMI OB Status Smoking Status 100% 31.28 kg/m2 Hysterectomy Never Smoker Vitals History BMI and BSA Data Body Mass Index Body Surface Area  
 31.28 kg/m 2 1.84 m 2 Preferred Pharmacy Pharmacy Name Phone [phone], [address] [place] [address] [phone] Your Updated Medication List  
  
   
This list is accurate as of 6/19/18  3:06 PM.  Always use your most recent med list.  
  
  
  
  
 acetaminophen 500 mg tablet Commonly known as:  TYLENOL Take 500 mg by mouth every six (6) hours as needed for Pain or Fever. Artifi. Tear (Hypromellose)-PF 0.3 % Drop Apply 1 Drop to eye daily as needed. atorvastatin 40 mg tablet Commonly known as:  LIPITOR  
take 1 tablet by mouth once daily Blood-Glucose Meter monitoring kit Use as directed to test blood sugar CALTRATE 600 + D PO Take 600 mg by mouth daily. CENTRUM SILVER PO Take 1 Tab by mouth daily. cyanocobalamin 1,000 mcg tablet Take 1,000 mcg by mouth daily. furosemide 20 mg tablet Commonly known as:  LASIX  
take 1 tablet by mouth once daily for edema  
  
 glucose blood VI test strips strip Commonly known as:  ONETOUCH ULTRA TEST Use as directed to test blood sugar TID  
  
 insulin glargine 100 unit/mL (3 mL) Inpn Commonly known as:  LANTUS SOLOSTAR U-100 INSULIN  
inject 20 units subcutaneously at bedtime  
  
 insulin lispro 100 unit/mL kwikpen Commonly known as:  HumaLOG KwikPen Insulin BELOW 160 TAKING 3 UNITS ABOVE 160 TAKING 1 UNITS ABOVE 200 TAKING 2 UNITS Insulin Needles (Disposable) 31 gauge x 5/16\" Ndle Commonly known as:  BD ULTRA-FINE SHORT PEN NEEDLE  
USE AS DIRECTED TWICE DAILY  
  
 lancets 28 gauge Misc Commonly known as:  FREESTYLE LANCETS Use as directed to test blood glucose TID  
  
 loperamide 1 mg/5 mL solution Commonly known as:  IMODIUM Take 1 mg by mouth three (3) times daily as needed for Diarrhea.   
  
 OMEGA 3 FISH -1,200 mg Cpdr  
 Generic drug:  Omega-3 Fatty Acids-Fish Oil Take 1 Tab by mouth daily. omeprazole 20 mg capsule Commonly known as:  PRILOSEC  
take 1 capsule by mouth once daily OXYGEN-AIR DELIVERY SYSTEMS  
3 L by Nasal route continuous. RESTASIS 0.05 % ophthalmic emulsion Generic drug:  cycloSPORINE  
instill 1 drop into both eyes twice a day SIMBRINZA 1-0.2 % Drps Generic drug:  brinzolamide-brimonidine Apply 1 Drop to eye two (2) times a day. patient reports use of eye drops in both eyes TAGRISSO 80 mg tablet Generic drug:  osimertinib Take 80 mg by mouth daily. trimethoprim-sulfamethoxazole 160-800 mg per tablet Commonly known as:  BACTRIM DS, SEPTRA DS Take 1 Tab by mouth two (2) times a day for 3 days. Prescriptions Sent to Pharmacy Refills  
 trimethoprim-sulfamethoxazole (BACTRIM DS, SEPTRA DS) 160-800 mg per tablet 0 Sig: Take 1 Tab by mouth two (2) times a day for 3 days. Class: Normal  
 Pharmacy: [address] [place] [address] #: [phone] Route: Oral  
  
Patient Instructions Urinary Tract Infection in Women: Care Instructions Your Care Instructions A urinary tract infection, or UTI, is a general term for an infection anywhere between the kidneys and the urethra (where urine comes out). Most UTIs are bladder infections. They often cause pain or burning when you urinate. UTIs are caused by bacteria and can be cured with antibiotics. Be sure to complete your treatment so that the infection goes away. Follow-up care is a key part of your treatment and safety. Be sure to make and go to all appointments, and call your doctor if you are having problems. It's also a good idea to know your test results and keep a list of the medicines you take. How can you care for yourself at home? · Take your antibiotics as directed.  Do not stop taking them just because you feel better. You need to take the full course of antibiotics. · Drink extra water and other fluids for the next day or two. This may help wash out the bacteria that are causing the infection. (If you have kidney, heart, or liver disease and have to limit fluids, talk with your doctor before you increase your fluid intake.) · Avoid drinks that are carbonated or have caffeine. They can irritate the bladder. · Urinate often. Try to empty your bladder each time. · To relieve pain, take a hot bath or lay a heating pad set on low over your lower belly or genital area. Never go to sleep with a heating pad in place. To prevent UTIs · Drink plenty of water each day. This helps you urinate often, which clears bacteria from your system. (If you have kidney, heart, or liver disease and have to limit fluids, talk with your doctor before you increase your fluid intake.) · Urinate when you need to. · Urinate right after you have sex. · Change sanitary pads often. · Avoid douches, bubble baths, feminine hygiene sprays, and other feminine hygiene products that have deodorants. · After going to the bathroom, wipe from front to back. When should you call for help? Call your doctor now or seek immediate medical care if: 
? · Symptoms such as fever, chills, nausea, or vomiting get worse or appear for the first time. ? · You have new pain in your back just below your rib cage. This is called flank pain. ? · There is new blood or pus in your urine. ? · You have any problems with your antibiotic medicine. ? Watch closely for changes in your health, and be sure to contact your doctor if: 
? · You are not getting better after taking an antibiotic for 2 days. ? · Your symptoms go away but then come back. Where can you learn more? Go to http://quirino-zeinab.info/. Enter G449 in the search box to learn more about \"Urinary Tract Infection in Women: Care Instructions. \" Current as of: May 12, 2017 Content Version: 11.4 © 5139-1315 [employer]. Care instructions adapted under license by [employer] (which disclaims liability or warranty for this information). If you have questions about a medical condition or this instruction, always ask your healthcare professional. [address] any warranty or liability for your use of this information. Introducing [facility] HE[facility]! Dear Niall Blanco: 
Thank you for requesting a Netpulse account. Our records indicate that you already have an active Netpulse account. You can access your account anytime at [email or web address] Did you know that you can access your hospital and ER discharge instructions at any time in Netpulse? You can also review all of your test results from your hospital stay or ER visit. Additional Information If you have questions, please visit the Frequently Asked Questions section of the Netpulse website at [email or web address] Remember, Netpulse is NOT to be used for urgent needs. For medical emergencies, dial 911. Now available from your iPhone and Android! Please provide this summary of care documentation to your next provider. Your primary care clinician is listed as Daniel Villagomez. If you have any questions after today's visit, please call [phone].

## 2018-06-19 NOTE — PATIENT INSTRUCTIONS

## 2018-06-21 LAB
BACTERIA SPEC CULT: ABNORMAL
SERVICE CMNT-IMP: ABNORMAL

## 2018-06-25 ENCOUNTER — TELEPHONE (OUTPATIENT)
Dept: INTERNAL MEDICINE CLINIC | Age: 83
End: 2018-06-25

## 2018-06-25 RX ORDER — SULFAMETHOXAZOLE AND TRIMETHOPRIM 800; 160 MG/1; MG/1
1 TABLET ORAL 2 TIMES DAILY
Qty: 10 TAB | Refills: 0 | Status: SHIPPED | OUTPATIENT
Start: 2018-06-25 | End: 2018-06-30

## 2018-06-25 RX ORDER — FUROSEMIDE 20 MG/1
20 TABLET ORAL DAILY
Qty: 90 TAB | Refills: 3 | Status: SHIPPED | OUTPATIENT
Start: 2018-06-25 | End: 2019-07-04 | Stop reason: SDUPTHER

## 2018-06-25 NOTE — TELEPHONE ENCOUNTER
Contacted pt. Daughter in law answered. 2 pt identifiers confirmed. Notified that provider reordered Bactrim for her to take another course. Verbalized understanding. No further questions or concerns at this time.

## 2018-06-25 NOTE — TELEPHONE ENCOUNTER
Her culture shows the bacteria is susceptible to Bactrim so I have reordered this for an extended course.

## 2018-06-25 NOTE — TELEPHONE ENCOUNTER
Requested Prescriptions     Pending Prescriptions Disp Refills    furosemide (LASIX) 20 mg tablet         Mail Order pharmacy requesting 90 day fill

## 2018-06-25 NOTE — TELEPHONE ENCOUNTER
Returned pt.'s call in re:to below note;2 Pt Identifiers verified; Reviewed symptoms w/pt.'s daughter. States has completed Rx of Bactrim per order but is still having burning when urinating;not as bad but still burning. Would like advisement as to how to proceed. Donavan Banks 18 minutes ago (11:19 AM)                 Daughter in law called to inform staff that patient is still experiencing burning sensation after completion of medication received last week.   Daughter in [place] would like call from clinical staff to discuss.

## 2018-06-25 NOTE — TELEPHONE ENCOUNTER
Daughter in law called to inform staff that patient is still experiencing burning sensation after completion of medication received last week. Daughter in [place] would like call from clinical staff to discuss.

## 2018-07-10 ENCOUNTER — OFFICE VISIT (OUTPATIENT)
Dept: INTERNAL MEDICINE CLINIC | Age: 83
End: 2018-07-10

## 2018-07-10 ENCOUNTER — HOSPITAL ENCOUNTER (OUTPATIENT)
Dept: LAB | Age: 83
Discharge: HOME | End: 2018-07-10
Payer: MEDICARE

## 2018-07-10 VITALS
HEART RATE: 95 BPM | RESPIRATION RATE: 20 BRPM | SYSTOLIC BLOOD PRESSURE: 152 MMHG | DIASTOLIC BLOOD PRESSURE: 72 MMHG | HEIGHT: 62 IN | BODY MASS INDEX: 31.73 KG/M2 | OXYGEN SATURATION: 100 % | TEMPERATURE: 95 F | WEIGHT: 172.4 LBS

## 2018-07-10 DIAGNOSIS — R30.0 DYSURIA: ICD-10-CM

## 2018-07-10 DIAGNOSIS — R30.0 DYSURIA: Primary | ICD-10-CM

## 2018-07-10 PROCEDURE — 87077 CULTURE AEROBIC IDENTIFY: CPT | Performed by: INTERNAL MEDICINE

## 2018-07-10 PROCEDURE — 87086 URINE CULTURE/COLONY COUNT: CPT | Performed by: INTERNAL MEDICINE

## 2018-07-10 PROCEDURE — 87186 SC STD MICRODIL/AGAR DIL: CPT | Performed by: INTERNAL MEDICINE

## 2018-07-10 RX ORDER — CIPROFLOXACIN 500 MG/1
500 TABLET ORAL 2 TIMES DAILY
Qty: 10 TAB | Refills: 0 | Status: SHIPPED | OUTPATIENT
Start: 2018-07-10 | End: 2018-07-15

## 2018-07-10 NOTE — PROGRESS NOTES
ROOM # [id number]  Identified pt with two pt identifiers(name and ). Reviewed record in preparation for visit and have obtained necessary documentation. Chief Complaint   Patient presents with    Urinary Pain      [id number] [facility]  N preferred language for health care discussion is english/other. Is the patient using any DME equipment during OV? Leslie Huggins is due for:  Health Maintenance Due   Topic    DTaP/Tdap/Td series (1 - Tdap)    ZOSTER VACCINE AGE 60>     Bone Densitometry (Dexa) Screening     MEDICARE YEARLY EXAM     MICROALBUMIN Q1      Health Maintenance reviewed and discussed per provider  Please order/place referral if appropriate. Advance Directive:  1. Do you have an advance directive in place? Patient Reply: NO    2. If not, would you like material regarding how to put one in place? NO    Coordination of Care:  1. Have you been to the ER, urgent care clinic since your last visit? Hospitalized since your last visit? NO    2. Have you seen or consulted any other health care providers outside of the [address] [place] [address] since your last visit? Include any pap smears or colon screening. NO    Patient is accompanied by son I have received verbal consent from [id number] [facility]  N to discuss any/all medical information while they are present in the room.     Learning Assessment:  Learning Assessment 2013   PRIMARY LEARNER Patient Patient Patient Patient   HIGHEST LEVEL OF EDUCATION - PRIMARY LEARNER  2 YEARS OF COLLEGE 2 YEARS OF COLLEGE 2 YEARS OF COLLEGE -   BARRIERS PRIMARY LEARNER - NONE - -   [address] [place] - - > 4 YEARS [address] ENGLISH ENGLISH ENGLISH   PRIMARY LANGUAGE CO-LEARNER - - ENGLISH -    NEED - No No -   LEARNER PREFERENCE PRIMARY READING DEMONSTRATION OTHER (COMMENT) DEMONSTRATION     VIDEOS - DEMONSTRATION -   LEARNER PREFERENCE CO-LEARNER - - DEMONSTRATION -   LEARNING SPECIAL TOPICS - no no -   ANSWERED BY patient patient patient patient   RELATIONSHIP SELF SELF - SELF     Depression Screening:  PHQ over the last two weeks 7/10/2018 6/19/2018 5/17/2018 5/22/2017 4/17/2017 4/12/2017 3/30/2017   Little interest or pleasure in doing things Not at all Not at all Not at all Not at all Not at all Not at all Several days   Feeling down, depressed or hopeless Not at all Not at all Not at all Not at all Not at all Not at all Not at all   Total Score PHQ 2 0 0 0 0 0 0 1     Abuse Screening:  Abuse Screening Questionnaire 5/22/2017 6/1/2015   Do you ever feel afraid of your partner? N N   Are you in a relationship with someone who physically or mentally threatens you? N N   Is it safe for you to go home? Y Y     Fall Risk  Fall Risk Assessment, last 12 mths 7/10/2018 6/19/2018 5/17/2018 2/12/2018 5/22/2017 4/17/2017 4/12/2017   Able to walk? Yes Yes Yes Yes Yes Yes Yes   Fall in past 12 months?  No No No No No No No   Fall with injury? - - - - - - -

## 2018-07-10 NOTE — MR AVS SNAPSHOT
Hailee Anglin 
 
 
 [address] [address] 
[phone] Patient: [id number]  [address] MRN: [id number] BASIL:4/0/8932 Visit Information Date & Time Provider Department Dept. Phone Encounter #  
 7/10/2018  8:45 AM Audrey Nguyen, [address] [phone] [id number] Follow-up Instructions Return if symptoms worsen or fail to improve. Your Appointments 8/17/2018  9:15 AM  
Office Visit with MD PEACE Mendoza [facility]) Appt Note: 3 month f/u combo clinic  
 [address]  
  
   
 [address] [facility] Upcoming Health Maintenance Date Due DTaP/Tdap/Td series (1 - Tdap) 3/9/1951 ZOSTER VACCINE AGE 60> 1/9/1990 Bone Densitometry (Dexa) Screening 3/9/1995 MEDICARE YEARLY EXAM 5/23/2018 MICROALBUMIN Q1 6/1/2018 FOOT EXAM Q1 11/9/2018 LIPID PANEL Q1 11/9/2018 HEMOGLOBIN A1C Q6M 11/17/2018 EYE EXAM RETINAL OR DILATED Q1 5/3/2019 GLAUCOMA SCREENING Q2Y 5/3/2020 Allergies as of 7/10/2018  Review Complete On: 7/10/2018 By: Digna Juárez Severity Noted Reaction Type Reactions Egg  02/20/2014    Other (comments) Per Dr. Darcie Toth note on 11/20/13: She cannot take the flu shot because of egg allergy Current Immunizations  Never Reviewed Name Date Pneumococcal Polysaccharide (PPSV-23) 11/3/2012 Not reviewed this visit You Were Diagnosed With   
  
 Codes Comments Dysuria    -  Primary ICD-10-CM: R30.0 ICD-9-CM: 453. 1 Vitals BP Pulse Temp Resp Height(growth percentile) Weight(growth percentile) 152/72 (BP 1 Location: Left arm, BP Patient Position: Sitting) 95 95 °F (35 °C) (Oral) 20 5' 2\" (1.575 m) 172 lb 6.4 oz (78.2 kg) SpO2 BMI OB Status Smoking Status 100% 31.53 kg/m2 Hysterectomy Never Smoker BMI and BSA Data Body Mass Index Body Surface Area  
 31.53 kg/m 2 1.85 m 2 Preferred Pharmacy Pharmacy Name Phone [phone] [facility], [address] [place] [address] [phone] Your Updated Medication List  
  
   
This list is accurate as of 7/10/18  9:12 AM.  Always use your most recent med list.  
  
  
  
  
 acetaminophen 500 mg tablet Commonly known as:  TYLENOL Take 500 mg by mouth every six (6) hours as needed for Pain or Fever. Artifi. Tear (Hypromellose)-PF 0.3 % Drop Apply 1 Drop to eye daily as needed. atorvastatin 40 mg tablet Commonly known as:  LIPITOR  
take 1 tablet by mouth once daily Blood-Glucose Meter monitoring kit Use as directed to test blood sugar CALTRATE 600 + D PO Take 600 mg by mouth daily. CENTRUM SILVER PO Take 1 Tab by mouth daily. ciprofloxacin HCl 500 mg tablet Commonly known as:  CIPRO Take 1 Tab by mouth two (2) times a day for 5 days. cyanocobalamin 1,000 mcg tablet Take 1,000 mcg by mouth daily. furosemide 20 mg tablet Commonly known as:  LASIX Take 1 Tab by mouth daily. glucose blood VI test strips strip Commonly known as:  ONETOUCH ULTRA TEST Use as directed to test blood sugar TID  
  
 insulin glargine 100 unit/mL (3 mL) Inpn Commonly known as:  LANTUS SOLOSTAR U-100 INSULIN  
inject 20 units subcutaneously at bedtime  
  
 insulin lispro 100 unit/mL kwikpen Commonly known as:  HumaLOG KwikPen Insulin BELOW 160 TAKING 3 UNITS ABOVE 160 TAKING 1 UNITS ABOVE 200 TAKING 2 UNITS Insulin Needles (Disposable) 31 gauge x 5/16\" Ndle Commonly known as:  BD ULTRA-FINE SHORT PEN NEEDLE  
USE AS DIRECTED TWICE DAILY  
  
 lancets 28 gauge Misc Commonly known as:  FREESTYLE LANCETS Use as directed to test blood glucose TID  
  
 loperamide 1 mg/5 mL solution Commonly known as:  IMODIUM Take 1 mg by mouth three (3) times daily as needed for Diarrhea. OMEGA 3 FISH -1,200 mg Cpdr  
Generic drug:  Omega-3 Fatty Acids-Fish Oil Take 1 Tab by mouth daily. omeprazole 20 mg capsule Commonly known as:  PRILOSEC  
take 1 capsule by mouth once daily OXYGEN-AIR DELIVERY SYSTEMS  
3 L by Nasal route continuous. RESTASIS 0.05 % ophthalmic emulsion Generic drug:  cycloSPORINE  
instill 1 drop into both eyes twice a day SIMBRINZA 1-0.2 % Drps Generic drug:  brinzolamide-brimonidine Apply 1 Drop to eye two (2) times a day. patient reports use of eye drops in both eyes TAGRISSO 80 mg tablet Generic drug:  osimertinib Take 80 mg by mouth daily. Prescriptions Sent to Pharmacy Refills  
 ciprofloxacin HCl (CIPRO) 500 mg tablet 0 Sig: Take 1 Tab by mouth two (2) times a day for 5 days. Class: Normal  
 Pharmacy: [address] [place] [address] #: [phone] Route: Oral  
  
Follow-up Instructions Return if symptoms worsen or fail to improve. Patient Instructions Painful Urination (Dysuria): Care Instructions Your Care Instructions Burning pain with urination (dysuria) is a common symptom of a urinary tract infection or other urinary problems. The bladder may become inflamed. This can cause pain when the bladder fills and empties. You may also feel pain if the tube that carries urine from the bladder to the outside of the body (urethra) gets irritated or infected. Sexually transmitted infections (STIs) also may cause pain when you urinate. Sometimes the pain can be caused by things other than an infection. The urethra can be irritated by soaps, perfumes, or foreign objects in the urethra. Kidney stones can cause pain when they pass through the urethra. The cause may be hard to find. You may need tests.  Treatment for painful urination depends on the cause. Follow-up care is a key part of your treatment and safety. Be sure to make and go to all appointments, and call your doctor if you are having problems. It's also a good idea to know your test results and keep a list of the medicines you take. How can you care for yourself at home? · Drink extra water for the next day or two. This will help make the urine less concentrated. (If you have kidney, heart, or liver disease and have to limit fluids, talk with your doctor before you increase the amount of fluids you drink.) · Avoid drinks that are carbonated or have caffeine. They can irritate the bladder. · Urinate often. Try to empty your bladder each time. For women: · Urinate right after you have sex. · After going to the bathroom, wipe from front to back. · Avoid douches, bubble baths, and feminine hygiene sprays. And avoid other feminine hygiene products that have deodorants. When should you call for help? Call your doctor now or seek immediate medical care if: 
? · You have new symptoms, such as fever, nausea, or vomiting. ? · You have new or worse symptoms of a urinary problem. For example: ¨ You have blood or pus in your urine. ¨ You have chills or body aches. ¨ It hurts worse to urinate. ¨ You have groin or belly pain. ¨ You have pain in your back just below your rib cage (the flank area). ? Watch closely for changes in your health, and be sure to contact your doctor if you have any problems. Where can you learn more? Go to http://quirino-zeinab.info/. Enter G069 in the search box to learn more about \"Painful Urination (Dysuria): Care Instructions. \" Current as of: May 12, 2017 Content Version: 11.4 © 3817-4315 [employer]. Care instructions adapted under license by [employer] (which disclaims liability or warranty for this information).  If you have questions about a medical condition or this instruction, always ask your healthcare professional. [address] any warranty or liability for your use of this information. Introducing [facility]! Dear Holly Randhawa: 
Thank you for requesting a Mclowd account. Our records indicate that you already have an active Mclowd account. You can access your account anytime at [email or web address] Did you know that you can access your hospital and ER discharge instructions at any time in Mclowd? You can also review all of your test results from your hospital stay or ER visit. Additional Information If you have questions, please visit the Frequently Asked Questions section of the Mclowd website at [email or web address] Remember, Mclowd is NOT to be used for urgent needs. For medical emergencies, dial 911. Now available from your iPhone and Android! Please provide this summary of care documentation to your next provider. Your primary care clinician is listed as Daniel Villagomez. If you have any questions after today's visit, please call [phone].

## 2018-07-10 NOTE — PROGRESS NOTES
HISTORY OF PRESENT ILLNESS  Ethan Phillips is a 80 y.o. female. HPI Comments: 81 yo female with h/o recurrent UTI with c/o dysuria x 6 days. No fevers, chills. Has some side pain. Last treated for UTI last month with bactrim. UC + klebsiella. DM has been doing well. Typically 150-170 on home readings. Review of Systems   Constitutional: Negative for chills, fever and weight loss. HENT: Negative for congestion and ear pain. Eyes: Negative for blurred vision and pain. Respiratory: Negative for cough and shortness of breath. Cardiovascular: Negative for chest pain, palpitations and leg swelling. Gastrointestinal: Negative for nausea and vomiting. Genitourinary: Positive for dysuria and frequency. Negative for hematuria and urgency. Musculoskeletal: Negative for joint pain and myalgias. Skin: Negative for itching and rash. Neurological: Negative for dizziness, tingling and headaches. Psychiatric/Behavioral: Negative for depression. The patient is not nervous/anxious. Past Medical History:   Diagnosis Date    [facility]     [facility]     GERD (gastroesophageal reflux disease)     Glaucoma     Hypercholesterolemia     Hypertension     Lung [facility] ([facility]     on tarciva     Current Outpatient Prescriptions on File Prior to Visit   Medication Sig Dispense Refill    furosemide (LASIX) 20 mg tablet Take 1 Tab by mouth daily.  90 Tab 3    insulin glargine (LANTUS SOLOSTAR U-100 INSULIN) 100 unit/mL (3 mL) inpn inject 20 units subcutaneously at bedtime 15 mL 5    Insulin Needles, Disposable, (BD ULTRA-FINE SHORT PEN NEEDLE) 31 gauge x 5/16\" ndle USE AS DIRECTED TWICE DAILY 200 Pen Needle 5    insulin lispro (HUMALOG KWIKPEN INSULIN) 100 unit/mL kwikpen BELOW 160 TAKING 3 UNITS ABOVE 160 TAKING 1 UNITS ABOVE 200 TAKING 2 UNITS 5 Pen 3    Blood-Glucose Meter monitoring kit Use as directed to test blood sugar 1 Kit 0    glucose blood VI test strips (ONETOUCH ULTRA TEST) strip Use as directed to test blood sugar  Strip 5    lancets (FREESTYLE LANCETS) 28 gauge misc Use as directed to test blood glucose  Lancet 5    omeprazole (PRILOSEC) 20 mg capsule take 1 capsule by mouth once daily 90 Cap 5    atorvastatin (LIPITOR) 40 mg tablet take 1 tablet by mouth once daily 60 Tab 5    FOLIC ACID/MULTIVIT-MIN/LUTEIN (CENTRUM SILVER PO) Take 1 Tab by mouth daily.  cyanocobalamin 1,000 mcg tablet Take 1,000 mcg by mouth daily.  CALCIUM CARBONATE/VITAMIN D3 (CALTRATE 600 + D PO) Take 600 mg by mouth daily.  Omega-3 Fatty Acids-Fish Oil (OMEGA 3 FISH OIL) 684-1,200 mg cpDR Take 1 Tab by mouth daily.  loperamide (IMODIUM) 1 mg/5 mL solution Take 1 mg by mouth three (3) times daily as needed for Diarrhea.  OXYGEN-AIR DELIVERY SYSTEMS 3 L by Nasal route continuous.  acetaminophen (TYLENOL) 500 mg tablet Take 500 mg by mouth every six (6) hours as needed for Pain or Fever.  Artifi. Tear, Hypromellose,-PF 0.3 % drop Apply 1 Drop to eye daily as needed.  RESTASIS 0.05 % ophthalmic emulsion instill 1 drop into both eyes twice a day  0    TAGRISSO 80 mg tablet Take 80 mg by mouth daily.  brinzolamide-brimonidine (SIMBRINZA) 1-0.2 % drps Apply 1 Drop to eye two (2) times a day. patient reports use of eye drops in both eyes       No current facility-administered medications on file prior to visit. Physical Exam   Constitutional: She appears well-developed and well-nourished. No distress. /72 (BP 1 Location: Left arm, BP Patient Position: Sitting)  Pulse 95  Temp 95 °F (35 °C) (Oral)   Resp 20  Ht 5' 2\" (1.575 m)  Wt 172 lb 6.4 oz (78.2 kg)  SpO2 100%  BMI 31.53 kg/m2     Eyes: EOM are normal. Right eye exhibits no discharge. Left eye exhibits no discharge. No scleral icterus. Neck: Neck supple. Cardiovascular: Normal rate, regular rhythm and normal heart sounds. Exam reveals no gallop and no friction rub.     No murmur heard.  Pulmonary/Chest: Effort normal and breath sounds normal. No respiratory distress. She has no wheezes. She has no rales. Abdominal: Soft. She exhibits no distension. There is tenderness (Llq, suprapubic). There is no rebound. +/- flank pain   Musculoskeletal: She exhibits no edema or tenderness. Lymphadenopathy:     She has no cervical adenopathy. Neurological: She is alert. She exhibits normal muscle tone. Skin: Skin is warm and dry. Psychiatric: She has a normal mood and affect. Lab Results   Component Value Date/Time    Sodium 142 11/09/2017 11:19 AM    Potassium 5.0 11/09/2017 11:19 AM    Chloride 107 11/09/2017 11:19 AM    CO2 26 11/09/2017 11:19 AM    Anion gap 9 11/09/2017 11:19 AM    Glucose 102 (H) 11/09/2017 11:19 AM    BUN 29 (H) 11/09/2017 11:19 AM    Creatinine 1.85 (H) 11/09/2017 11:19 AM    BUN/Creatinine ratio 16 11/09/2017 11:19 AM    GFR est AA 31 (L) 11/09/2017 11:19 AM    GFR est non-AA 26 (L) 11/09/2017 11:19 AM    Calcium 9.1 11/09/2017 11:19 AM    Bilirubin, total 0.2 11/09/2017 11:19 AM    AST (SGOT) 48 (H) 11/09/2017 11:19 AM    Alk. phosphatase 81 11/09/2017 11:19 AM    Protein, total 7.4 11/09/2017 11:19 AM    Albumin 3.6 11/09/2017 11:19 AM    Globulin 3.8 11/09/2017 11:19 AM    A-G Ratio 0.9 11/09/2017 11:19 AM    ALT (SGPT) 63 (H) 11/09/2017 11:19 AM     ASSESSMENT and PLAN    ICD-10-CM ICD-9-CM    1. Dysuria R30.0 788.1      LE + on dipstick. Will send for UC. Cipro started today.

## 2018-07-13 LAB
BACTERIA SPEC CULT: ABNORMAL
SERVICE CMNT-IMP: ABNORMAL

## 2018-07-16 ENCOUNTER — TELEPHONE (OUTPATIENT)
Dept: INTERNAL MEDICINE CLINIC | Age: 83
End: 2018-07-16

## 2018-07-16 NOTE — TELEPHONE ENCOUNTER
Patient's daughter is calling to speak with Dr. Asha Sierra nurse in reference to a new prescriptions that is needed.     James Jux - [phone]

## 2018-07-16 NOTE — TELEPHONE ENCOUNTER
PCP: Tegan Louise MD    Last appt: 7/10/2018  Future Appointments  Date Time Provider Taras Fairi   8/17/2018 9:15 AM Tegan Louise MD [facility] SCHED       Requested Prescriptions     Pending Prescriptions Disp Refills    lancets (FREESTYLE LANCETS) 28 gauge misc 200 Lancet 5     Sig: Use as directed to test blood glucose TID       Request for a 30 or 90 day supply? Provider Discretion    Pharmacy: [facility]    Other Comments:   printed and placed in PCP medication refill review folder.      Last UDS date: N/A  Pain contract signed: N/A

## 2018-07-17 RX ORDER — LANCETS 28 GAUGE
EACH MISCELLANEOUS
Qty: 200 LANCET | Refills: 5 | Status: SHIPPED | OUTPATIENT
Start: 2018-07-17 | End: 2019-08-30

## 2018-07-19 ENCOUNTER — TELEPHONE (OUTPATIENT)
Dept: INTERNAL MEDICINE CLINIC | Age: 83
End: 2018-07-19

## 2018-07-19 NOTE — TELEPHONE ENCOUNTER
2 pt. Identifiers confirmed. Pt. Notified of below. She states she will call her daughter in law and confirm that this is still something they want. Will await call back. No other questions/concerns at this time.

## 2018-07-19 NOTE — TELEPHONE ENCOUNTER
I can order this but please be sure they are aware that I do not believe this is covered by insurance at this time.

## 2018-07-19 NOTE — TELEPHONE ENCOUNTER
Patient's daughter-in-law called to request Wojciech Blood Glucose Meter Monitoring kit. Patient requests meter because it is the one that does not require puncturing the skin.

## 2018-07-20 NOTE — TELEPHONE ENCOUNTER
Pt daughter in law contacted. 2 pt identifiers confirmed. Pt daughter in law informed that Mariah Cotton sensor has been sent to pharm and they can check pricing to see if they wish to purchase it at this time. Pt daughter in law verbalized understanding, no other questions or concerns at this time.

## 2018-10-11 ENCOUNTER — DOCUMENTATION ONLY (OUTPATIENT)
Dept: INTERNAL MEDICINE CLINIC | Age: 83
End: 2018-10-11

## 2018-10-11 ENCOUNTER — OFFICE VISIT (OUTPATIENT)
Dept: INTERNAL MEDICINE CLINIC | Age: 83
End: 2018-10-11

## 2018-10-11 VITALS
OXYGEN SATURATION: 97 % | TEMPERATURE: 96.8 F | HEIGHT: 62 IN | BODY MASS INDEX: 30.73 KG/M2 | RESPIRATION RATE: 18 BRPM | WEIGHT: 167 LBS | HEART RATE: 82 BPM | DIASTOLIC BLOOD PRESSURE: 63 MMHG | SYSTOLIC BLOOD PRESSURE: 130 MMHG

## 2018-10-11 DIAGNOSIS — Z23 ENCOUNTER FOR IMMUNIZATION: ICD-10-CM

## 2018-10-11 DIAGNOSIS — Z79.4 TYPE 2 DIABETES MELLITUS WITHOUT COMPLICATION, WITH LONG-TERM CURRENT USE OF INSULIN (HCC): ICD-10-CM

## 2018-10-11 DIAGNOSIS — Z00.00 MEDICARE ANNUAL WELLNESS VISIT, SUBSEQUENT: Primary | ICD-10-CM

## 2018-10-11 DIAGNOSIS — I10 ESSENTIAL HYPERTENSION: ICD-10-CM

## 2018-10-11 DIAGNOSIS — E11.9 TYPE 2 DIABETES MELLITUS WITHOUT COMPLICATION, WITH LONG-TERM CURRENT USE OF INSULIN (HCC): ICD-10-CM

## 2018-10-11 DIAGNOSIS — Z99.81 ON HOME O2: ICD-10-CM

## 2018-10-11 LAB — HBA1C MFR BLD HPLC: 5.4 %

## 2018-10-11 RX ORDER — PEN NEEDLE, DIABETIC 30 GX3/16"
NEEDLE, DISPOSABLE MISCELLANEOUS
Qty: 400 PEN NEEDLE | Refills: 3 | Status: SHIPPED | OUTPATIENT
Start: 2018-10-11 | End: 2019-04-16 | Stop reason: SDUPTHER

## 2018-10-11 RX ORDER — LANOLIN ALCOHOL/MO/W.PET/CERES
325 CREAM (GRAM) TOPICAL
COMMUNITY

## 2018-10-11 RX ORDER — INSULIN GLARGINE 100 [IU]/ML
INJECTION, SOLUTION SUBCUTANEOUS
Qty: 15 ML | Refills: 5
Start: 2018-10-11 | End: 2019-06-07 | Stop reason: SDUPTHER

## 2018-10-11 NOTE — MR AVS SNAPSHOT
[address] [facility] 
 
 
 [address] [facility] [address] 
[phone] Patient: [id number] [facility] 59  N MRN: [id number] Visit Information Date & Time Provider Department Dept. Phone Encounter #  
 10/11/2018  9:15 AM Benjamin Lau [facility] [phone] [id number] Follow-up Instructions Return in about 3 months (around 1/11/2019), or if symptoms worsen or fail to improve, for diabetes, hypertension. Upcoming Health Maintenance Date Due DTaP/Tdap/Td series (1 - Tdap) 3/9/1951 Shingrix Vaccine Age 50> (1 of 2) 3/9/1980 Bone Densitometry (Dexa) Screening 3/9/1995 MEDICARE YEARLY EXAM 5/23/2018 MICROALBUMIN Q1 6/1/2018 FOOT EXAM Q1 11/9/2018 LIPID PANEL Q1 11/9/2018 HEMOGLOBIN A1C Q6M 11/17/2018 EYE EXAM RETINAL OR DILATED Q1 5/3/2019 GLAUCOMA SCREENING Q2Y 6/22/2020 Allergies as of 10/11/2018  Review Complete On: 10/11/2018 By: Bernard Langford Severity Noted Reaction Type Reactions Egg  02/20/2014    Other (comments) Per Dr. Tito Dunaway note on 11/20/13: She cannot take the flu shot because of egg allergy Current Immunizations  Never Reviewed Name Date Pneumococcal Conjugate (PCV-13)  Incomplete Pneumococcal Polysaccharide (PPSV-23) 11/3/2012 Not reviewed this visit You Were Diagnosed With   
  
 Codes Comments Medicare annual wellness visit, subsequent    -  Primary ICD-10-CM: Z00.00 ICD-9-CM: V70.0 Type 2 diabetes mellitus without complication, with long-term current use of insulin (HCC)     ICD-10-CM: E11.9, Z79.4 ICD-9-CM: 250.00, V58.67 Essential hypertension     ICD-10-CM: I10 
ICD-9-CM: 401.9 Encounter for immunization     ICD-10-CM: Q78 ICD-9-CM: V03.89 On home O2     ICD-10-CM: Z99.81 ICD-9-CM: V46.2 Vitals BP Pulse Temp Resp Height(growth percentile) Weight(growth percentile) 130/63 (BP 1 Location: Right arm, BP Patient Position: Sitting) 82 96.8 °F (36 °C) (Oral) 18 5' 2\" (1.575 m) 167 lb (75.8 kg) SpO2 BMI OB Status Smoking Status 97% 30.54 kg/m2 Hysterectomy Never Smoker BMI and BSA Data Body Mass Index Body Surface Area 30.54 kg/m 2 1.82 m 2 Preferred Pharmacy Pharmacy Name Phone Tessie Carpenter, [facility] [phone] Your Updated Medication List  
  
   
This list is accurate as of 10/11/18  9:56 AM.  Always use your most recent med list.  
  
  
  
  
 acetaminophen 500 mg tablet Commonly known as:  TYLENOL Take 500 mg by mouth every six (6) hours as needed for Pain or Fever. Artifi. Tear (Hypromellose)-PF 0.3 % Drop Apply 1 Drop to eye daily as needed. atorvastatin 40 mg tablet Commonly known as:  LIPITOR  
take 1 tablet by mouth once daily Blood-Glucose Meter monitoring kit Use as directed to test blood sugar CALTRATE 600 + D PO Take 600 mg by mouth daily. CENTRUM SILVER PO Take 1 Tab by mouth daily. cyanocobalamin 1,000 mcg tablet Take 1,000 mcg by mouth daily. ferrous sulfate 325 mg (65 mg iron) tablet 325 mg.  
  
 flash glucose scanning reader Misc Commonly known as:  FREESTYLE JUNIOR 10 DAY READER Use as directed to test blood glucose TID  
  
 flash glucose sensor Kit Commonly known as:  [address] [employer] ([email or web address]) Use as directed to test blood glucose TID  
  
 furosemide 20 mg tablet Commonly known as:  LASIX Take 1 Tab by mouth daily. glucose blood VI test strips strip Commonly known as:  ONETOUCH ULTRA TEST Use as directed to test blood sugar TID  
  
 insulin glargine 100 unit/mL (3 mL) Inpn Commonly known as:  LANTUS SOLOSTAR U-100 INSULIN  
inject 15 units subcutaneously at bedtime  
  
 insulin lispro 100 unit/mL kwikpen Commonly known as:  HumaLOG KwikPen Insulin BELOW 160 TAKING 3 UNITS ABOVE 160 TAKING 1 UNITS ABOVE 200 TAKING 2 UNITS Insulin Needles (Disposable) 31 gauge x 5/16\" Ndle Commonly known as:  BD ULTRA-FINE SHORT PEN NEEDLE  
USE AS DIRECTED FOUR TIMES DAILY  
  
 lancets 28 gauge Misc Commonly known as:  FREESTYLE LANCETS Use as directed to test blood glucose TID  
  
 loperamide 1 mg/5 mL solution Commonly known as:  IMODIUM Take 1 mg by mouth three (3) times daily as needed for Diarrhea. OMEGA 3 FISH -1,200 mg Cpdr  
Generic drug:  Omega-3 Fatty Acids-Fish Oil Take 1 Tab by mouth daily. omeprazole 20 mg capsule Commonly known as:  PRILOSEC  
take 1 capsule by mouth once daily OXYGEN-AIR DELIVERY SYSTEMS  
3 L by Nasal route continuous. RESTASIS 0.05 % ophthalmic emulsion Generic drug:  cycloSPORINE  
instill 1 drop into both eyes twice a day SIMBRINZA 1-0.2 % Drps Generic drug:  brinzolamide-brimonidine Apply 1 Drop to eye two (2) times a day. patient reports use of eye drops in both eyes TAGRISSO 80 mg tablet Generic drug:  osimertinib Take 80 mg by mouth daily. varicella-zoster recombinant (PF) 50 mcg/0.5 mL Susr injection Commonly known as:  SHINGRIX (PF)  
0.5 mL by IntraMUSCular route once for 1 dose. Indications: PREVENTION OF HERPES ZOSTER Prescriptions Printed Refills  
 varicella-zoster recombinant, PF, (SHINGRIX, PF,) 50 mcg/0.5 mL susr injection 0 Si.5 mL by IntraMUSCular route once for 1 dose. Indications: PREVENTION OF HERPES ZOSTER Class: Print Route: IntraMUSCular Prescriptions Sent to Pharmacy Refills Insulin Needles, Disposable, (BD ULTRA-FINE SHORT PEN NEEDLE) 31 gauge x 5/16\" ndle 3 Sig: USE AS DIRECTED FOUR TIMES DAILY Class: Normal  
 Pharmacy: [facility] , [address] [place] [address] Ph #: [phone] We Performed the Following AMB POC HEMOGLOBIN A1C [47208 CPT(R)] PNEUMOCOCCAL CONJ VACCINE 13 VALENT IM [id number] CPT(R)] Follow-up Instructions Return in about 3 months (around 1/11/2019), or if symptoms worsen or fail to improve, for diabetes, hypertension. To-Do List   
 10/11/2018 Lab:  MICROALBUMIN, UR, RAND W/ MICROALB/CREAT RATIO Patient Instructions Medicare Wellness Visit, Female The best way to live healthy is to have a lifestyle where you eat a well-balanced diet, exercise regularly, limit alcohol use, and quit all forms of tobacco/nicotine, if applicable. Regular preventive services are another way to keep healthy. Preventive services (vaccines, screening tests, monitoring & exams) can help personalize your care plan, which helps you manage your own care. Screening tests can find health problems at the earliest stages, when they are easiest to treat. [facility] Tai follows the current, evidence-based guidelines published by the [facility] Terence Gale ([facility]) when recommending preventive services for our patients. Because we follow these guidelines, sometimes recommendations change over time as research supports it. (For example, mammograms used to be recommended annually. Even though Medicare will still pay for an annual mammogram, the newer guidelines recommend a mammogram every two years for women of average risk.) Of course, you and your doctor may decide to screen more often for some diseases, based on your risk and your health status. Preventive services for you include: - Medicare offers their members a free annual wellness visit, which is time for you and your primary care provider to discuss and plan for your preventive service needs. Take advantage of this benefit every year! 
-All adults over the age of 72 should receive the recommended pneumonia vaccines. Current USPSTF guidelines recommend a series of two vaccines for the best pneumonia protection. -All adults should have a flu vaccine yearly and a tetanus vaccine every 10 years. All adults age 61 and older should receive a shingles vaccine once in their lifetime.   
-A bone mass density test is recommended when a woman turns 65 to screen for osteoporosis. This test is only recommended one time, as a screening. Some providers will use this same test as a disease monitoring tool if you already have osteoporosis. -All adults age 38-68 who are overweight should have a diabetes screening test once every three years.  
-Other screening tests and preventive services for persons with diabetes include: an eye exam to screen for diabetic retinopathy, a kidney function test, a foot exam, and stricter control over your cholesterol.  
-Cardiovascular screening for adults with routine risk involves an electrocardiogram (ECG) at intervals determined by your doctor.  
-Colorectal cancer screenings should be done for adults age 54-65 with no increased risk factors for colorectal cancer. There are a number of acceptable methods of screening for this type of cancer. Each test has its own benefits and drawbacks. Discuss with your doctor what is most appropriate for you during your annual wellness visit. The different tests include: colonoscopy (considered the best screening method), a fecal occult blood test, a fecal DNA test, and sigmoidoscopy. -Breast cancer screenings are recommended every other year for women of normal risk, age 54-69. 
-Cervical cancer screenings for women over age 72 are only recommended with certain risk factors.  
-All adults born between [facility] should be screened once for Hepatitis C. Here is a list of your current Health Maintenance items (your personalized list of preventive services) with a due date: 
Health Maintenance Due Topic Date Due  
 DTaP/Tdap/Td  (1 - Tdap) 03/09/1951  Shingles Vaccine (1 of 2) 03/09/1980  Bone Mineral Density   03/09/1995 [facility] Annual Well Visit  05/23/2018  Albumin Urine Test  06/01/2018  Diabetic Foot Care  11/09/2018  Cholesterol Test   11/09/2018 Diabetes and Preventing Falls: Care Instructions Your Care Instructions If you are an older adult who has diabetes, you may have a higher risk of falling. Complications of diabetessuch as nerve damage, foot problems, and reduced visionmay increase your risk of a fall. Some of your medicines also may add to your risk. By making your home safer, you can lower your risk of falling. Doing things to prevent diabetes complications may also help to lower your risk. You can make your home safer with a few simple measures. Follow-up care is a key part of your treatment and safety. Be sure to make and go to all appointments, and call your doctor if you are having problems. It's also a good idea to know your test results and keep a list of the medicines you take. How can you care for yourself at home? Taking care of yourself · Keep your blood sugar at a target level (which you set with your doctor). · Exercise regularly to improve your strength, muscle tone, and balance. Walk if you can. Swimming may be a good choice if you cannot walk easily. · Have your vision checked as often as your doctor recommends. It is usually once a year or more often if you have eye problems. · Know the side effects of the medicines you take. Ask your doctor or pharmacist whether the medicines you take can affect your balance. Sleeping pills or sedatives can affect your balance. · Limit the amount of alcohol you drink. Alcohol can impair your balance and other senses. · Have your doctor check your feet during each visit. If you have a foot problem, see your doctor. Preventing falls at home · Remove raised doorway thresholds, throw rugs, and clutter. Repair loose carpet or raised areas in the floor. · Move furniture and electrical cords to keep them out of walking paths. · Use nonskid floor wax, and wipe up spills right away, especially on ceramic tile floors. · If you use a walker or cane, put rubber tips on it. If you use crutches, clean the bottoms of them regularly with an abrasive pad, such as steel wool. · Keep your house well lit, especially Debbrah Fleischer, and outside walkways. Use night-lights in areas such as hallways and bathrooms. Add extra light switches or use remote switches (such as switches that go on or off when you clap your hands) to make it easier to turn lights on if you have to get up during the night. · Install sturdy handrails on stairways. Put grab bars near your shower, bathtub, and toilet. · Store household items on low shelves so that you do not have to climb or reach high. Or use a reaching device that you can get at a medical supply store. If you have to climb for something, use a step stool with handrails, or ask someone to get it for you. · Keep a cordless phone and a flashlight with new batteries by your bed. If possible, put a phone in each of the main rooms of your house, or carry a cell phone in case you fall and cannot reach a phone. Or you can wear a device around your neck or wrist. You push a button that sends a signal for help. · Wear low-heeled shoes that fit well and give your feet good support. Use footwear with nonskid soles. Check the heels and soles of your shoes for wear. Repair or replace worn heels or soles. · Do not wear socks without shoes on wood floors. · Walk on the grass when the sidewalks are slippery. If you live in an area that gets snow and ice in the winter, sprinkle salt on slippery steps and sidewalks. Where can you learn more? Go to http://quirino-zeinab.info/. Enter W695 in the search box to learn more about \"Diabetes and Preventing Falls: Care Instructions. \" Current as of: March 16, 2018 Content Version: 11.8 © 0130-5114 Healthwise, Incorporated.  Care instructions adapted under license by Jaylene Villagomez (which disclaims liability or warranty for this information). If you have questions about a medical condition or this instruction, always ask your healthcare professional. [address] any warranty or liability for your use of this information. Introducing [facility]! Dear Serina Edge: 
Thank you for requesting a IQzone account. Our records indicate that you already have an active IQzone account. You can access your account anytime at [email or web address] Did you know that you can access your hospital and ER discharge instructions at any time in IQzone? You can also review all of your test results from your hospital stay or ER visit. Additional Information If you have questions, please visit the Frequently Asked Questions section of the IQzone website at [email or web address] Remember, IQzone is NOT to be used for urgent needs. For medical emergencies, dial 911. Now available from your iPhone and Android! Please provide this summary of care documentation to your next provider. Your primary care clinician is listed as Daniel Villagomez. If you have any questions after today's visit, please call [phone].

## 2018-10-11 NOTE — PATIENT INSTRUCTIONS
Medicare Wellness Visit, Female The best way to live healthy is to have a lifestyle where you eat a well-balanced diet, exercise regularly, limit alcohol use, and quit all forms of tobacco/nicotine, if applicable. Regular preventive services are another way to keep healthy. Preventive services (vaccines, screening tests, monitoring & exams) can help personalize your care plan, which helps you manage your own care. Screening tests can find health problems at the earliest stages, when they are easiest to treat. Ethan Tai follows the current, evidence-based guidelines published by the [facility] Terence Shahla ([facility]) when recommending preventive services for our patients. Because we follow these guidelines, sometimes recommendations change over time as research supports it. (For example, mammograms used to be recommended annually. Even though Medicare will still pay for an annual mammogram, the newer guidelines recommend a mammogram every two years for women of average risk.) Of course, you and your doctor may decide to screen more often for some diseases, based on your risk and your health status. Preventive services for you include: - Medicare offers their members a free annual wellness visit, which is time for you and your primary care provider to discuss and plan for your preventive service needs. Take advantage of this benefit every year! 
-All adults over the age of 72 should receive the recommended pneumonia vaccines. Current USPSTF guidelines recommend a series of two vaccines for the best pneumonia protection.  
-All adults should have a flu vaccine yearly and a tetanus vaccine every 10 years. All adults age 61 and older should receive a shingles vaccine once in their lifetime.   
-A bone mass density test is recommended when a woman turns 65 to screen for osteoporosis. This test is only recommended one time, as a screening. Some providers will use this same test as a disease monitoring tool if you already have osteoporosis. -All adults age 38-68 who are overweight should have a diabetes screening test once every three years.  
-Other screening tests and preventive services for persons with diabetes include: an eye exam to screen for diabetic retinopathy, a kidney function test, a foot exam, and stricter control over your cholesterol.  
-Cardiovascular screening for adults with routine risk involves an electrocardiogram (ECG) at intervals determined by your doctor.  
-Colorectal cancer screenings should be done for adults age 54-65 with no increased risk factors for colorectal cancer. There are a number of acceptable methods of screening for this type of cancer. Each test has its own benefits and drawbacks. Discuss with your doctor what is most appropriate for you during your annual wellness visit. The different tests include: colonoscopy (considered the best screening method), a fecal occult blood test, a fecal DNA test, and sigmoidoscopy. -Breast cancer screenings are recommended every other year for women of normal risk, age 54-69. 
-Cervical cancer screenings for women over age 72 are only recommended with certain risk factors.  
-All adults born between [facility] should be screened once for Hepatitis C. Here is a list of your current Health Maintenance items (your personalized list of preventive services) with a due date: 
Health Maintenance Due Topic Date Due  
 DTaP/Tdap/Td  (1 - Tdap) 03/09/1951  Shingles Vaccine (1 of 2) 03/09/1980  Bone Mineral Density   03/09/1995 [email or web address] Annual Well Visit  05/23/2018  Albumin Urine Test  06/01/2018  Diabetic Foot Care  11/09/2018  Cholesterol Test   11/09/2018 Diabetes and Preventing Falls: Care Instructions Your Care Instructions If you are an older adult who has diabetes, you may have a higher risk of falling. Complications of diabetessuch as nerve damage, foot problems, and reduced visionmay increase your risk of a fall. Some of your medicines also may add to your risk. By making your home safer, you can lower your risk of falling. Doing things to prevent diabetes complications may also help to lower your risk. You can make your home safer with a few simple measures. Follow-up care is a key part of your treatment and safety. Be sure to make and go to all appointments, and call your doctor if you are having problems. It's also a good idea to know your test results and keep a list of the medicines you take. How can you care for yourself at home? Taking care of yourself · Keep your blood sugar at a target level (which you set with your doctor). · Exercise regularly to improve your strength, muscle tone, and balance. Walk if you can. Swimming may be a good choice if you cannot walk easily. · Have your vision checked as often as your doctor recommends. It is usually once a year or more often if you have eye problems. · Know the side effects of the medicines you take. Ask your doctor or pharmacist whether the medicines you take can affect your balance. Sleeping pills or sedatives can affect your balance. · Limit the amount of alcohol you drink. Alcohol can impair your balance and other senses. · Have your doctor check your feet during each visit. If you have a foot problem, see your doctor. Preventing falls at home · Remove raised doorway thresholds, throw rugs, and clutter. Repair loose carpet or raised areas in the floor. · Move furniture and electrical cords to keep them out of walking paths. · Use nonskid floor wax, and wipe up spills right away, especially on ceramic tile floors. · If you use a walker or cane, put rubber tips on it. If you use crutches, clean the bottoms of them regularly with an abrasive pad, such as steel wool. · Keep your house well lit, especially Aakash Ket, and outside walkways. Use night-lights in areas such as hallways and bathrooms. Add extra light switches or use remote switches (such as switches that go on or off when you clap your hands) to make it easier to turn lights on if you have to get up during the night. · Install sturdy handrails on stairways. Put grab bars near your shower, bathtub, and toilet. · Store household items on low shelves so that you do not have to climb or reach high. Or use a reaching device that you can get at a medical supply store. If you have to climb for something, use a step stool with handrails, or ask someone to get it for you. · Keep a cordless phone and a flashlight with new batteries by your bed. If possible, put a phone in each of the main rooms of your house, or carry a cell phone in case you fall and cannot reach a phone. Or you can wear a device around your neck or wrist. You push a button that sends a signal for help. · Wear low-heeled shoes that fit well and give your feet good support. Use footwear with nonskid soles. Check the heels and soles of your shoes for wear. Repair or replace worn heels or soles. · Do not wear socks without shoes on wood floors. · Walk on the grass when the sidewalks are slippery. If you live in an area that gets snow and ice in the winter, sprinkle salt on slippery steps and sidewalks. Where can you learn more? Go to http://quirino-zeinab.info/. Enter S483 in the search box to learn more about \"Diabetes and Preventing Falls: Care Instructions. \" Current as of: March 16, 2018 Content Version: 11.8 © 5233-0255 Healthwise, Qumu. Care instructions adapted under license by [employer] (which disclaims liability or warranty for this information).  If you have questions about a medical condition or this instruction, always ask your healthcare professional. [employer] disclaims any warranty or liability for your use of this information.

## 2018-10-11 NOTE — PROGRESS NOTES
HISTORY OF PRESENT ILLNESS Tonio Butts is a 80 y.o. female. HPI Comments: 79 yo female here for [address], f/u of DM, HTN. No complaints other than noticing bruising/lesion L antecubital area. No pain or itching Not having GERD. Wants to taper off PPI 
DM avg 175. Checks QID. Adjusts insulin dose based on this. HTN is well controlled. Has home O2. Uses prn. Occasionally feels SOB when anxious and this helps. Continues to f/u with oncology for her lung CA. Review of Systems Constitutional: Negative for chills, fever and weight loss. HENT: Negative for congestion and ear pain. Eyes: Negative for blurred vision and pain. Respiratory: Positive for shortness of breath (intermittent). Negative for cough. Cardiovascular: Negative for chest pain, palpitations and leg swelling. Gastrointestinal: Negative for nausea and vomiting. Genitourinary: Negative for frequency and urgency. Musculoskeletal: Negative for joint pain and myalgias. Skin: Negative for itching and rash. Neurological: Negative for dizziness, tingling and headaches. Psychiatric/Behavioral: Negative for depression. The patient is not nervous/anxious. Past Medical History:  
Diagnosis Date  Cancer ([facility]  Diabetes ([facility]  GERD (gastroesophageal reflux disease)  Glaucoma  Hypercholesterolemia  Hypertension  Lung cancer ([facility]   
 on tarciva Current Outpatient Prescriptions on File Prior to Visit Medication Sig Dispense Refill  flash glucose scanning reader (FREESTYLE JUNIOR READER) misc Use as directed to test blood glucose TID 1 Each 0  
 flash glucose sensor (FREESTYLE JUNIOR SENSOR) kit Use as directed to test blood glucose TID 1 Kit 3  
 lancets (FREESTYLE LANCETS) 28 gauge misc Use as directed to test blood glucose  Lancet 5  furosemide (LASIX) 20 mg tablet Take 1 Tab by mouth daily.  90 Tab 3  
 insulin glargine (LANTUS SOLOSTAR U-100 INSULIN) 100 unit/mL (3 mL) inpn inject 20 units subcutaneously at bedtime 15 mL 5  
 insulin lispro (HUMALOG KWIKPEN INSULIN) 100 unit/mL kwikpen BELOW 160 TAKING 3 UNITS ABOVE 160 TAKING 1 UNITS ABOVE 200 TAKING 2 UNITS 5 Pen 3  Blood-Glucose Meter monitoring kit Use as directed to test blood sugar 1 Kit 0  
 glucose blood VI test strips (ONETOUCH ULTRA TEST) strip Use as directed to test blood sugar  Strip 5  
 omeprazole (PRILOSEC) 20 mg capsule take 1 capsule by mouth once daily 90 Cap 5  
 atorvastatin (LIPITOR) 40 mg tablet take 1 tablet by mouth once daily 60 Tab 5  
 FOLIC ACID/MULTIVIT-MIN/LUTEIN (CENTRUM SILVER PO) Take 1 Tab by mouth daily.  cyanocobalamin 1,000 mcg tablet Take 1,000 mcg by mouth daily.  CALCIUM CARBONATE/VITAMIN D3 (CALTRATE 600 + D PO) Take 600 mg by mouth daily.  Omega-3 Fatty Acids-Fish Oil (OMEGA 3 FISH OIL) 684-1,200 mg cpDR Take 1 Tab by mouth daily.  loperamide (IMODIUM) 1 mg/5 mL solution Take 1 mg by mouth three (3) times daily as needed for Diarrhea.  OXYGEN-AIR DELIVERY SYSTEMS 3 L by Nasal route continuous.  acetaminophen (TYLENOL) 500 mg tablet Take 500 mg by mouth every six (6) hours as needed for Pain or Fever.  Artifi. Tear, Hypromellose,-PF 0.3 % drop Apply 1 Drop to eye daily as needed.  RESTASIS 0.05 % ophthalmic emulsion instill 1 drop into both eyes twice a day  0  
 TAGRISSO 80 mg tablet Take 80 mg by mouth daily.  brinzolamide-brimonidine (SIMBRINZA) 1-0.2 % drps Apply 1 Drop to eye two (2) times a day. patient reports use of eye drops in both eyes  Insulin Needles, Disposable, (BD ULTRA-FINE SHORT PEN NEEDLE) 31 gauge x 5/16\" ndle USE AS DIRECTED TWICE DAILY 200 Pen Needle 5 No current facility-administered medications on file prior to visit. Physical Exam  
Constitutional: She appears well-developed and well-nourished. No distress.   
/63 (BP 1 Location: Right arm, BP Patient Position: Sitting)  Pulse 82  Temp 96.8 °F (36 °C) (Oral)   Resp 18  Ht 5' 2\" (1.575 m)  Wt 167 lb (75.8 kg)  SpO2 97%  BMI 30.54 kg/m2 Eyes: EOM are normal. Right eye exhibits no discharge. Left eye exhibits no discharge. No scleral icterus. Neck: Neck supple. Cardiovascular: Normal rate, regular rhythm and normal heart sounds. Exam reveals no gallop and no friction rub. No murmur heard. Pulmonary/Chest: Effort normal and breath sounds normal. No respiratory distress. She has no wheezes. She has no rales. Musculoskeletal: She exhibits no edema or tenderness. Lymphadenopathy:  
  She has no cervical adenopathy. Neurological: She is alert. She exhibits normal muscle tone. Skin: Skin is warm and dry. Bruising (L antecubital fossa) noted. Psychiatric: She has a normal mood and affect. Lab Results Component Value Date/Time Sodium 142 11/09/2017 11:19 AM  
 Potassium 5.0 11/09/2017 11:19 AM  
 Chloride 107 11/09/2017 11:19 AM  
 CO2 26 11/09/2017 11:19 AM  
 Anion gap 9 11/09/2017 11:19 AM  
 Glucose 102 (H) 11/09/2017 11:19 AM  
 BUN 29 (H) 11/09/2017 11:19 AM  
 Creatinine 1.85 (H) 11/09/2017 11:19 AM  
 BUN/Creatinine ratio 16 11/09/2017 11:19 AM  
 GFR est AA 31 (L) 11/09/2017 11:19 AM  
 GFR est non-AA 26 (L) 11/09/2017 11:19 AM  
 Calcium 9.1 11/09/2017 11:19 AM  
 Bilirubin, total 0.2 11/09/2017 11:19 AM  
 AST (SGOT) 48 (H) 11/09/2017 11:19 AM  
 Alk. phosphatase 81 11/09/2017 11:19 AM  
 Protein, total 7.4 11/09/2017 11:19 AM  
 Albumin 3.6 11/09/2017 11:19 AM  
 Globulin 3.8 11/09/2017 11:19 AM  
 A-G Ratio 0.9 11/09/2017 11:19 AM  
 ALT (SGPT) 63 (H) 11/09/2017 11:19 AM  
 
Lab Results Component Value Date/Time WBC 4.0 (L) 11/09/2017 11:19 AM  
 WBC 6.4 07/02/2012 01:54 PM  
 HGB 11.5 (L) 11/09/2017 11:19 AM  
 HCT 35.8 11/09/2017 11:19 AM  
 PLATELET 016 (L) 15/05/4374 11:19 AM  
 .0 (H) 11/09/2017 11:19 AM  
 
Lab Results Component Value Date/Time Hemoglobin A1c 6.4 (H) 11/09/2017 11:19 AM  
 Hemoglobin A1c (POC) 6.7 01/08/2016 11:30 AM  
 
ASSESSMENT and PLAN 
  ICD-10-CM ICD-9-CM 1. Medicare annual wellness visit, subsequent Z00.00 V70.0 2. Type 2 diabetes mellitus without complication, with long-term current use of insulin (HCC) E11.9 250.00 AMB POC HEMOGLOBIN A1C  
 Z79.4 V58.67 MICROALBUMIN, UR, RAND W/ MICROALB/CREAT RATIO 3. Essential hypertension I10 401.9 4. Encounter for immunization Z23 V03.89 PNEUMOCOCCAL CONJ VACCINE 13 VALENT IM  
5. On home O2 Z99.81 V46.2 A1c stable. Microalbumin ordered but she does not feel she can give urine sample today BP controlled Will continue with current medication. Will review prior testing for home O2 which is used prn Prevnar 13 given today.

## 2018-10-11 NOTE — PROGRESS NOTES
ROOM # 16 Identified pt with two pt identifiers(name and ). Reviewed record in preparation for visit and have obtained necessary documentation. Chief Complaint Patient presents with [address] [place] [address] Annual Wellness Visit Jamie Ambrosio preferred language for health care discussion is english/other. Is the patient using any DME equipment during OV? YES Jamie Ambrosio is due for: 
Health Maintenance Due Topic  DTaP/Tdap/Td series (1 - Tdap)  Shingrix Vaccine Age 50> (1 of 2)  Bone Densitometry (Dexa) Screening  MEDICARE YEARLY EXAM   
 MICROALBUMIN Q1   
 FOOT EXAM Q1   
 LIPID PANEL Q1 Health Maintenance reviewed and discussed per provider Please order/place referral if appropriate. Advance Directive: 1. Do you have an advance directive in place? Patient Reply: NO 
 
2. If not, would you like material regarding how to put one in place? NO Coordination of Care: 1. Have you been to the ER, urgent care clinic since your last visit? Hospitalized since your last visit? NO 
 
2. Have you seen or consulted any other health care providers outside of the [address] [place] [address] since your last visit? Include any pap smears or colon screening. YES Patient is accompanied by son I have received verbal consent from Jamie Ambrosio to discuss any/all medical information while they are present in the room. Learning Assessment: 
Learning Assessment 2013 PRIMARY LEARNER Patient Patient Patient Patient HIGHEST LEVEL OF EDUCATION - PRIMARY LEARNER  2 YEARS OF COLLEGE 2 YEARS OF COLLEGE 2 YEARS OF COLLEGE -  
BARRIERS PRIMARY LEARNER - NONE - -  
CO-LEARNER CAREGIVER - - Yes -  
[address] - - > 4 [id number] [facility] - - ENGLISH -  
 NEED - No No -  
 LEARNER PREFERENCE PRIMARY READING DEMONSTRATION OTHER (COMMENT) DEMONSTRATION  
  VIDEOS - DEMONSTRATION -  
LEARNER PREFERENCE CO-LEARNER - - DEMONSTRATION -  
LEARNING SPECIAL TOPICS - no no -  
ANSWERED BY patient patient patient patient RELATIONSHIP SELF SELF - SELF Depression Screening: PHQ over the last two weeks 10/11/2018 7/10/2018 6/19/2018 5/17/2018 5/22/2017 4/17/2017 4/12/2017 Little interest or pleasure in doing things Not at all Not at all Not at all Not at all Not at all Not at all Not at all Feeling down, depressed, irritable, or hopeless Not at all Not at all Not at all Not at all Not at all Not at all Not at all Total Score PHQ 2 0 0 0 0 0 0 0 Abuse Screening: 
Abuse Screening Questionnaire 5/22/2017 6/1/2015 Do you ever feel afraid of your partner? Fredy Kline Are you in a relationship with someone who physically or mentally threatens you? Fredy Kline Is it safe for you to go home? Cheli Salinas Fall Risk Fall Risk Assessment, last 12 mths 10/11/2018 7/10/2018 6/19/2018 5/17/2018 2/12/2018 5/22/2017 4/17/2017 Able to walk? Yes Yes Yes Yes Yes Yes Yes Fall in past 12 months?  No No No No No No No  
Fall with injury? - - - - - - -

## 2018-10-11 NOTE — PROGRESS NOTES
This is the Subsequent Medicare Annual Wellness Exam, performed 12 months or more after the Initial AWV or the last Subsequent AWV I have reviewed the patient's medical history in detail and updated the computerized patient record. History 81 yo female here for [address] Independent in [place] but does not drive. Good support from family. Lives alone. Past Medical History:  
Diagnosis Date  Cancer ([address]  Diabetes ([address]  GERD (gastroesophageal reflux disease)  Glaucoma  Hypercholesterolemia  Hypertension  Lung cancer ([address]   
 on tarciva Past Surgical History:  
Procedure Laterality Date  DELIVERY     
 HX BREAST BIOPSY Right X 3  
 all benign  HX HYSTERECTOMY  years ago  
 partial via abdmen  HX OTHER SURGICAL    
 ulcer on toe  HX OTHER SURGICAL    
 removal \"boil\" from chest  
 
Current Outpatient Prescriptions Medication Sig Dispense Refill  flash glucose scanning reader (FREESTYLE JUNIOR READER) misc Use as directed to test blood glucose TID 1 Each 0  
 flash glucose sensor (FREESTYLE JUNIOR SENSOR) kit Use as directed to test blood glucose TID 1 Kit 3  
 lancets (FREESTYLE LANCETS) 28 gauge misc Use as directed to test blood glucose  Lancet 5  furosemide (LASIX) 20 mg tablet Take 1 Tab by mouth daily. 90 Tab 3  
 insulin glargine (LANTUS SOLOSTAR U-100 INSULIN) 100 unit/mL (3 mL) inpn inject 20 units subcutaneously at bedtime 15 mL 5  
 insulin lispro (HUMALOG KWIKPEN INSULIN) 100 unit/mL kwikpen BELOW 160 TAKING 3 UNITS ABOVE 160 TAKING 1 UNITS ABOVE 200 TAKING 2 UNITS 5 Pen 3  Blood-Glucose Meter monitoring kit Use as directed to test blood sugar 1 Kit 0  
 glucose blood VI test strips (ONETOUCH ULTRA TEST) strip Use as directed to test blood sugar  Strip 5  
 omeprazole (PRILOSEC) 20 mg capsule take 1 capsule by mouth once daily 90 Cap 5  
 atorvastatin (LIPITOR) 40 mg tablet take 1 tablet by mouth once daily 60 Tab 5  FOLIC ACID/MULTIVIT-MIN/LUTEIN (CENTRUM SILVER PO) Take 1 Tab by mouth daily.  cyanocobalamin 1,000 mcg tablet Take 1,000 mcg by mouth daily.  CALCIUM CARBONATE/VITAMIN D3 (CALTRATE 600 + D PO) Take 600 mg by mouth daily.  Omega-3 Fatty Acids-Fish Oil (OMEGA 3 FISH OIL) 684-1,200 mg cpDR Take 1 Tab by mouth daily.  loperamide (IMODIUM) 1 mg/5 mL solution Take 1 mg by mouth three (3) times daily as needed for Diarrhea.  OXYGEN-AIR DELIVERY SYSTEMS 3 L by Nasal route continuous.  acetaminophen (TYLENOL) 500 mg tablet Take 500 mg by mouth every six (6) hours as needed for Pain or Fever.  Artifi. Tear, Hypromellose,-PF 0.3 % drop Apply 1 Drop to eye daily as needed.  RESTASIS 0.05 % ophthalmic emulsion instill 1 drop into both eyes twice a day  0  
 TAGRISSO 80 mg tablet Take 80 mg by mouth daily.  brinzolamide-brimonidine (SIMBRINZA) 1-0.2 % drps Apply 1 Drop to eye two (2) times a day. patient reports use of eye drops in both eyes  ferrous sulfate 325 mg (65 mg iron) tablet 325 mg.    
 Insulin Needles, Disposable, (BD ULTRA-FINE SHORT PEN NEEDLE) 31 gauge x 5/16\" ndle USE AS DIRECTED TWICE DAILY 200 Pen Needle 5 Allergies Allergen Reactions  Egg Other (comments) Per Dr. Twanna Lefort note on 11/20/13: She cannot take the flu shot because of egg allergy Family History Problem Relation Age of Onset  No Known Problems Other Social History Substance Use Topics  Smoking status: Never Smoker  Smokeless tobacco: Never Used  Alcohol use No  
 
Patient Active Problem List  
Diagnosis Code  Pure hypercholesterolemia E78.00  Hypertension I10  
 Diabetes mellitus ([address] E11.9  Burn T30.0  Lung cancer ([facility] C34.90  
 Diabetic polyneuropathy associated with diabetes mellitus due to underlying condition (HCC) E08.42  
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  
 
 
 Depression Risk Factor Screening: PHQ over the last two weeks 10/11/2018 Little interest or pleasure in doing things Not at all Feeling down, depressed, irritable, or hopeless Not at all Total Score PHQ 2 0 Alcohol Risk Factor Screening: You do not drink alcohol or very rarely. Functional Ability and Level of Safety:  
Hearing Loss Hearing is good. Activities of Daily Living The home contains: no safety equipment. Patient needs help with:  transportation Fall Risk Fall Risk Assessment, last 12 mths 10/11/2018 Able to walk? Yes Fall in past 12 months? No  
Fall with injury? -  
 
 
Abuse Screen Patient is not abused Cognitive Screening Evaluation of Cognitive Function: 
Has your family/caregiver stated any concerns about your memory: no 
Normal, Mini Cog test 
 
Patient Care Team  
Patient Care Team: 
Dominic Jerez MD as PCP - General (Internal Medicine) Jeffrey Almaraz MD (General Surgery) Devin Cruz MD (Ophthalmology) Nuria Cortes MD (Cardiology) Aracelis Vicente DPM (Podiatry) Bora Fenton MD (Internal Medicine) Katie Alcazar RN as Nurse Navigator Khloe Wong MD (Hematology and Oncology) Assessment/Plan Education and counseling provided: 
Are appropriate based on today's review and evaluation End-of-Life planning (with patient's consent) Pneumococcal Vaccine Diagnoses and all orders for this visit: 
 
1. Medicare annual wellness visit, subsequent Health Maintenance Due Topic Date Due  
 DTaP/Tdap/Td series (1 - Tdap) 03/09/1951  Shingrix Vaccine Age 50> (1 of 2) 03/09/1980  Bone Densitometry (Dexa) Screening  03/09/1995  MEDICARE YEARLY EXAM  05/23/2018  MICROALBUMIN Q1  06/01/2018  
 FOOT EXAM Q1  11/09/2018  LIPID PANEL Q1  11/09/2018 Discussed ACP. Has paperwork completed and can bring copy for ehr in future. Prevnar 13 given today. Rx printed for Shingrix

## 2018-10-11 NOTE — PROGRESS NOTES
PCV13 vaccine first dose administered in RT deltoid with patient's consent. Patient observed for 10 minutes, no reaction noted at present time. Patient tolerated procedure well and left without any complaints. Patient received PCV13 VIS sheet and verbalized understanding.

## 2018-10-11 NOTE — ACP (ADVANCE CARE PLANNING)
Advance Care Planning (ACP) Provider [facility] Date of ACP Conversation: 10/11/18 Persons included in Conversation:  patient and family Length of ACP Conversation in minutes:  <16 minutes (Non-Billable) Authorized Decision Maker (if patient is incapable of making informed decisions): This person is:  
Healthcare Agent/Medical Power of  under Advance Directive For Patients with Decision Making Capacity:  
Values/Goals: Exploration of values, goals, and preferences if recovery is not expected, even with continued medical treatment in the event of:  Imminent death Severe, permanent brain injury Conversation Outcomes / Follow-Up Plan:  
Recommended communicating the plan and making copies for the healthcare agent, personal physician, and others as appropriate (e.g., health system) Recommended review of completed ACP document annually or upon change in health status

## 2018-10-26 ENCOUNTER — OFFICE VISIT (OUTPATIENT)
Dept: INTERNAL MEDICINE CLINIC | Age: 83
End: 2018-10-26

## 2018-10-26 VITALS
SYSTOLIC BLOOD PRESSURE: 181 MMHG | WEIGHT: 167 LBS | HEIGHT: 62 IN | OXYGEN SATURATION: 100 % | TEMPERATURE: 95.7 F | BODY MASS INDEX: 30.73 KG/M2 | DIASTOLIC BLOOD PRESSURE: 86 MMHG | RESPIRATION RATE: 20 BRPM | HEART RATE: 94 BPM

## 2018-10-26 DIAGNOSIS — T30.0 BURN: Primary | ICD-10-CM

## 2018-10-26 NOTE — PROGRESS NOTES
ROOM # 18 Identified pt with two pt identifiers(name and ). Reviewed record in preparation for visit and have obtained necessary documentation. Chief Complaint Patient presents with  Burn  
  finger Jace Be preferred language for health care discussion is english/other. Is the patient using any DME equipment during OV? YES Jace Be is due for: 
Health Maintenance Due Topic  DTaP/Tdap/Td series (1 - Tdap)  Shingrix Vaccine Age 50> (1 of 2)  Bone Densitometry (Dexa) Screening  MICROALBUMIN Q1   
 FOOT EXAM Q1   
 LIPID PANEL Q1 Health Maintenance reviewed and discussed per provider Please order/place referral if appropriate. Advance Directive: 1. Do you have an advance directive in place? Patient Reply: NO 
 
2. If not, would you like material regarding how to put one in place? NO Coordination of Care: 1. Have you been to the ER, urgent care clinic since your last visit? Hospitalized since your last visit? NO 
 
2. Have you seen or consulted any other health care providers outside of the [address] [place] [address] since your last visit? Include any pap smears or colon screening. NO Patient is accompanied by son I have received verbal consent from Jace Be to discuss any/all medical information while they are present in the room. Learning Assessment: 
Learning Assessment 2013 PRIMARY LEARNER Patient Patient Patient Patient HIGHEST LEVEL OF EDUCATION - PRIMARY LEARNER  2 YEARS OF COLLEGE 2 YEARS OF COLLEGE 2 YEARS OF COLLEGE -  
BARRIERS PRIMARY LEARNER - NONE - -  
CO-LEARNER CAREGIVER - - Yes -  
[address] - - > 4 [id number] [place] [address] - - ENGLISH -  
 NEED - No No -  
 LEARNER PREFERENCE PRIMARY READING DEMONSTRATION OTHER (COMMENT) DEMONSTRATION  
  VIDEOS - DEMONSTRATION -  
LEARNER PREFERENCE CO-LEARNER - - DEMONSTRATION -  
LEARNING SPECIAL TOPICS - no no -  
ANSWERED BY patient patient patient patient RELATIONSHIP SELF SELF - SELF Depression Screening: PHQ over the last two weeks 10/26/2018 10/11/2018 7/10/2018 6/19/2018 5/17/2018 5/22/2017 4/17/2017 Little interest or pleasure in doing things Not at all Not at all Not at all Not at all Not at all Not at all Not at all Feeling down, depressed, irritable, or hopeless Not at all Not at all Not at all Not at all Not at all Not at all Not at all Total Score PHQ 2 0 0 0 0 0 0 0 Abuse Screening: 
Abuse Screening Questionnaire 5/22/2017 6/1/2015 Do you ever feel afraid of your partner? Toure Lowing Are you in a relationship with someone who physically or mentally threatens you? Toure Lowing Is it safe for you to go home? Elvira Roblero Fall Risk Fall Risk Assessment, last 12 mths 10/26/2018 10/11/2018 7/10/2018 6/19/2018 5/17/2018 2/12/2018 5/22/2017 Able to walk? Yes Yes Yes Yes Yes Yes Yes Fall in past 12 months?  No No No No No No No  
Fall with injury? - - - - - - -

## 2018-10-26 NOTE — PROGRESS NOTES
HISTORY OF PRESENT ILLNESS Sonia Brar is a 80 y.o. female. 81 yo female here for evaluation of burn to L forefinger tip. Burned this one week ago when taking out hot item from her microwave. Has been applying OTC silver based product. No pain but has decreased sensation. No drainage. Review of Systems Constitutional: Negative for chills, fever and weight loss. HENT: Negative for congestion and ear pain. Eyes: Negative for blurred vision and pain. Respiratory: Negative for cough and shortness of breath. Cardiovascular: Negative for chest pain, palpitations and leg swelling. Gastrointestinal: Negative for nausea and vomiting. Genitourinary: Negative for frequency and urgency. Musculoskeletal: Negative for joint pain and myalgias. Skin: Negative for itching and rash. Neurological: Negative for dizziness, tingling and headaches. Psychiatric/Behavioral: Negative for depression. The patient is not nervous/anxious. Past Medical History:  
Diagnosis Date  Cancer ([facility] [address]  [facility]  GERD (gastroesophageal reflux disease)  Glaucoma  Hypercholesterolemia  Hypertension  Lung cancer ([facility]   
 on tarciva Current Outpatient Medications on File Prior to Visit Medication Sig Dispense Refill  ferrous sulfate 325 mg (65 mg iron) tablet 325 mg.    
 insulin glargine (LANTUS SOLOSTAR U-100 INSULIN) 100 unit/mL (3 mL) inpn inject 15 units subcutaneously at bedtime 15 mL 5  
 Insulin Needles, Disposable, (BD ULTRA-FINE SHORT PEN NEEDLE) 31 gauge x 5/16\" ndle USE AS DIRECTED FOUR TIMES DAILY 400 Pen Needle 3  
 flash glucose scanning reader (FREESTYLE JUNIOR READER) misc Use as directed to test blood glucose TID 1 Each 0  
 flash glucose sensor (FREESTYLE JUNIOR SENSOR) kit Use as directed to test blood glucose TID 1 Kit 3  
 lancets (FREESTYLE LANCETS) 28 gauge misc Use as directed to test blood glucose  Lancet 5  
  furosemide (LASIX) 20 mg tablet Take 1 Tab by mouth daily. 90 Tab 3  
 insulin lispro (HUMALOG KWIKPEN INSULIN) 100 unit/mL kwikpen BELOW 160 TAKING 3 UNITS ABOVE 160 TAKING 1 UNITS ABOVE 200 TAKING 2 UNITS 5 Pen 3  Blood-Glucose Meter monitoring kit Use as directed to test blood sugar 1 Kit 0  
 glucose blood VI test strips (ONETOUCH ULTRA TEST) strip Use as directed to test blood sugar  Strip 5  
 omeprazole (PRILOSEC) 20 mg capsule take 1 capsule by mouth once daily 90 Cap 5  
 atorvastatin (LIPITOR) 40 mg tablet take 1 tablet by mouth once daily 60 Tab 5  
 FOLIC ACID/MULTIVIT-MIN/LUTEIN (CENTRUM SILVER PO) Take 1 Tab by mouth daily.  cyanocobalamin 1,000 mcg tablet Take 1,000 mcg by mouth daily.  CALCIUM CARBONATE/VITAMIN D3 (CALTRATE 600 + D PO) Take 600 mg by mouth daily.  Omega-3 Fatty Acids-Fish Oil (OMEGA 3 FISH OIL) 684-1,200 mg cpDR Take 1 Tab by mouth daily.  loperamide (IMODIUM) 1 mg/5 mL solution Take 1 mg by mouth three (3) times daily as needed for Diarrhea.  OXYGEN-AIR DELIVERY SYSTEMS 3 L by Nasal route continuous.  acetaminophen (TYLENOL) 500 mg tablet Take 500 mg by mouth every six (6) hours as needed for Pain or Fever.  Artifi. Tear, Hypromellose,-PF 0.3 % drop Apply 1 Drop to eye daily as needed.  RESTASIS 0.05 % ophthalmic emulsion instill 1 drop into both eyes twice a day  0  
 TAGRISSO 80 mg tablet Take 80 mg by mouth daily.  brinzolamide-brimonidine (SIMBRINZA) 1-0.2 % drps Apply 1 Drop to eye two (2) times a day. patient reports use of eye drops in both eyes No current facility-administered medications on file prior to visit. Physical Exam  
Constitutional: She appears well-developed and well-nourished. No distress. Pulmonary/Chest: Effort normal. No respiratory distress. Skin: Burn (1 cm,, tip of L forefinger) noted. No erythema. Vitals reviewed. Lab Results Component Value Date/Time Sodium 142 11/09/2017 11:19 AM  
 Potassium 5.0 11/09/2017 11:19 AM  
 Chloride 107 11/09/2017 11:19 AM  
 CO2 26 11/09/2017 11:19 AM  
 Anion gap 9 11/09/2017 11:19 AM  
 Glucose 102 (H) 11/09/2017 11:19 AM  
 BUN 29 (H) 11/09/2017 11:19 AM  
 Creatinine 1.85 (H) 11/09/2017 11:19 AM  
 BUN/Creatinine ratio 16 11/09/2017 11:19 AM  
 GFR est AA 31 (L) 11/09/2017 11:19 AM  
 GFR est non-AA 26 (L) 11/09/2017 11:19 AM  
 Calcium 9.1 11/09/2017 11:19 AM  
 Bilirubin, total 0.2 11/09/2017 11:19 AM  
 AST (SGOT) 48 (H) 11/09/2017 11:19 AM  
 Alk. phosphatase 81 11/09/2017 11:19 AM  
 Protein, total 7.4 11/09/2017 11:19 AM  
 Albumin 3.6 11/09/2017 11:19 AM  
 Globulin 3.8 11/09/2017 11:19 AM  
 A-G Ratio 0.9 11/09/2017 11:19 AM  
 ALT (SGPT) 63 (H) 11/09/2017 11:19 AM  
 
 
ASSESSMENT and PLAN Diagnoses and all orders for this visit: 1. Burn Discussed applying antibiotic ointment BID. Monitor for signs of infection

## 2018-12-10 RX ORDER — OMEPRAZOLE 20 MG/1
CAPSULE, DELAYED RELEASE ORAL
Qty: 90 CAP | Refills: 5 | Status: SHIPPED | OUTPATIENT
Start: 2018-12-10

## 2018-12-22 RX ORDER — ATORVASTATIN CALCIUM 40 MG/1
40 TABLET, FILM COATED ORAL DAILY
Qty: 90 TAB | Refills: 3 | Status: SHIPPED | OUTPATIENT
Start: 2018-12-22 | End: 2019-01-17 | Stop reason: DRUGHIGH

## 2018-12-22 NOTE — TELEPHONE ENCOUNTER
Son called in to request a refill, last OV 10/26/18, next scheduled 1/17/19    Requested Prescriptions     Pending Prescriptions Disp Refills    atorvastatin (LIPITOR) 40 mg tablet 60 Tab 5

## 2019-01-17 ENCOUNTER — OFFICE VISIT (OUTPATIENT)
Dept: INTERNAL MEDICINE CLINIC | Age: 84
End: 2019-01-17

## 2019-01-17 ENCOUNTER — HOSPITAL ENCOUNTER (OUTPATIENT)
Dept: LAB | Age: 84
Discharge: HOME | End: 2019-01-17
Payer: MEDICARE

## 2019-01-17 VITALS
DIASTOLIC BLOOD PRESSURE: 71 MMHG | HEART RATE: 95 BPM | OXYGEN SATURATION: 100 % | SYSTOLIC BLOOD PRESSURE: 135 MMHG | HEIGHT: 62 IN | WEIGHT: 159.2 LBS | BODY MASS INDEX: 29.3 KG/M2 | TEMPERATURE: 97.3 F | RESPIRATION RATE: 20 BRPM

## 2019-01-17 DIAGNOSIS — E11.9 TYPE 2 DIABETES MELLITUS WITHOUT COMPLICATION, WITH LONG-TERM CURRENT USE OF INSULIN (HCC): ICD-10-CM

## 2019-01-17 DIAGNOSIS — Z79.4 TYPE 2 DIABETES MELLITUS WITHOUT COMPLICATION, WITH LONG-TERM CURRENT USE OF INSULIN (HCC): ICD-10-CM

## 2019-01-17 DIAGNOSIS — Z79.4 TYPE 2 DIABETES MELLITUS WITHOUT COMPLICATION, WITH LONG-TERM CURRENT USE OF INSULIN (HCC): Primary | ICD-10-CM

## 2019-01-17 DIAGNOSIS — E11.9 TYPE 2 DIABETES MELLITUS WITHOUT COMPLICATION, WITH LONG-TERM CURRENT USE OF INSULIN (HCC): Primary | ICD-10-CM

## 2019-01-17 DIAGNOSIS — E78.2 MIXED HYPERLIPIDEMIA: ICD-10-CM

## 2019-01-17 DIAGNOSIS — R25.1 TREMOR: ICD-10-CM

## 2019-01-17 DIAGNOSIS — R19.7 DIARRHEA, UNSPECIFIED TYPE: ICD-10-CM

## 2019-01-17 LAB
ANION GAP SERPL CALC-SCNC: 3 MMOL/L (ref 3–18)
BUN SERPL-MCNC: 26 MG/DL (ref 7–18)
BUN/CREAT SERPL: 18 (ref 12–20)
CALCIUM SERPL-MCNC: 9 MG/DL (ref 8.5–10.1)
CHLORIDE SERPL-SCNC: 111 MMOL/L (ref 100–108)
CHOLEST SERPL-MCNC: 172 MG/DL
CO2 SERPL-SCNC: 27 MMOL/L (ref 21–32)
CREAT SERPL-MCNC: 1.45 MG/DL (ref 0.6–1.3)
EST. AVERAGE GLUCOSE BLD GHB EST-MCNC: 134 MG/DL
GLUCOSE SERPL-MCNC: 94 MG/DL (ref 74–99)
HBA1C MFR BLD: 6.3 % (ref 4.2–5.6)
HDLC SERPL-MCNC: 90 MG/DL (ref 40–60)
HDLC SERPL: 1.9 {RATIO} (ref 0–5)
LDLC SERPL CALC-MCNC: 63.6 MG/DL (ref 0–100)
LIPID PROFILE,FLP: ABNORMAL
POTASSIUM SERPL-SCNC: 4 MMOL/L (ref 3.5–5.5)
SODIUM SERPL-SCNC: 141 MMOL/L (ref 136–145)
T4 FREE SERPL-MCNC: 1.2 NG/DL (ref 0.7–1.5)
TRIGL SERPL-MCNC: 92 MG/DL (ref ?–150)
TSH SERPL DL<=0.05 MIU/L-ACNC: 0.28 UIU/ML (ref 0.36–3.74)
VLDLC SERPL CALC-MCNC: 18.4 MG/DL

## 2019-01-17 PROCEDURE — 84443 ASSAY THYROID STIM HORMONE: CPT

## 2019-01-17 PROCEDURE — 80061 LIPID PANEL: CPT

## 2019-01-17 PROCEDURE — 83036 HEMOGLOBIN GLYCOSYLATED A1C: CPT

## 2019-01-17 PROCEDURE — 80048 BASIC METABOLIC PNL TOTAL CA: CPT

## 2019-01-17 PROCEDURE — 36415 COLL VENOUS BLD VENIPUNCTURE: CPT

## 2019-01-17 PROCEDURE — 84439 ASSAY OF FREE THYROXINE: CPT

## 2019-01-17 RX ORDER — ATORVASTATIN CALCIUM 20 MG/1
20 TABLET, FILM COATED ORAL DAILY
Qty: 90 TAB | Refills: 3 | Status: SHIPPED | OUTPATIENT
Start: 2019-01-17

## 2019-01-17 NOTE — PROGRESS NOTES
HISTORY OF PRESENT ILLNESS Esau Yoo is a 80 y.o. female. 79 yo female here for f/u of DM. DM flucutating. As high as 400 once, 113 last night. Lowest reading 80. Denies change in diet. HLD: due for repeat labs. Lipitor should be 20 mg which she has been taking with stable labs in past.  
She notes continued diarrhea. Oncologist told her this was side effect of her medication, but medication has been controlling her cancer. No BRBPR She notes tremor when at rest or activity such as cutting her food. Not sig bothered by it, but doesn't like that people point it out to her. Review of Systems Constitutional: Negative for chills, fever and weight loss. HENT: Negative for congestion and ear pain. Eyes: Negative for blurred vision and pain. Respiratory: Negative for cough and shortness of breath. Cardiovascular: Negative for chest pain, palpitations and leg swelling. Gastrointestinal: Positive for diarrhea. Negative for abdominal pain, blood in stool, melena, nausea and vomiting. Genitourinary: Negative for frequency and urgency. Musculoskeletal: Negative for joint pain and myalgias. Skin: Negative for itching and rash. Neurological: Positive for tremors. Negative for dizziness, tingling and headaches. Psychiatric/Behavioral: Negative for depression. The patient is not nervous/anxious. Past Medical History:  
Diagnosis Date  Cancer ([facility] [address]  Diabetes ([facility] [address]  GERD (gastroesophageal reflux disease)  Glaucoma  Hypercholesterolemia  Hypertension  [facility]   
 on tarciva Current Outpatient Medications on File Prior to Visit Medication Sig Dispense Refill  atorvastatin (LIPITOR) 40 mg tablet Take 1 Tab by mouth daily. 90 Tab 3  
 omeprazole (PRILOSEC) 20 mg capsule TAKE 1 CAPSULE DAILY 90 Cap 5  ferrous sulfate 325 mg (65 mg iron) tablet 325 mg.    
 insulin glargine (LANTUS SOLOSTAR U-100 INSULIN) 100 unit/mL (3 mL) inpn inject 15 units subcutaneously at bedtime 15 mL 5  
 Insulin Needles, Disposable, (BD ULTRA-FINE SHORT PEN NEEDLE) 31 gauge x 5/16\" ndle USE AS DIRECTED FOUR TIMES DAILY 400 Pen Needle 3  
 flash glucose scanning reader (FREESTYLE JUNIOR READER) misc Use as directed to test blood glucose TID 1 Each 0  
 flash glucose sensor (FREESTYLE JUNIOR SENSOR) kit Use as directed to test blood glucose TID 1 Kit 3  
 lancets (FREESTYLE LANCETS) 28 gauge misc Use as directed to test blood glucose  Lancet 5  furosemide (LASIX) 20 mg tablet Take 1 Tab by mouth daily. 90 Tab 3  
 insulin lispro (HUMALOG KWIKPEN INSULIN) 100 unit/mL kwikpen BELOW 160 TAKING 3 UNITS ABOVE 160 TAKING 1 UNITS ABOVE 200 TAKING 2 UNITS 5 Pen 3  Blood-Glucose Meter monitoring kit Use as directed to test blood sugar 1 Kit 0  
 glucose blood VI test strips (ONETOUCH ULTRA TEST) strip Use as directed to test blood sugar  Strip 5  
 FOLIC ACID/MULTIVIT-MIN/LUTEIN (CENTRUM SILVER PO) Take 1 Tab by mouth daily.  cyanocobalamin 1,000 mcg tablet Take 1,000 mcg by mouth daily.  CALCIUM CARBONATE/VITAMIN D3 (CALTRATE 600 + D PO) Take 600 mg by mouth daily.  Omega-3 Fatty Acids-Fish Oil (OMEGA 3 FISH OIL) 684-1,200 mg cpDR Take 1 Tab by mouth daily.  loperamide (IMODIUM) 1 mg/5 mL solution Take 1 mg by mouth three (3) times daily as needed for Diarrhea.  OXYGEN-AIR DELIVERY SYSTEMS 3 L by Nasal route continuous.  acetaminophen (TYLENOL) 500 mg tablet Take 500 mg by mouth every six (6) hours as needed for Pain or Fever.  Artifi. Tear, Hypromellose,-PF 0.3 % drop Apply 1 Drop to eye daily as needed.  RESTASIS 0.05 % ophthalmic emulsion instill 1 drop into both eyes twice a day  0  
 TAGRISSO 80 mg tablet Take 80 mg by mouth daily.  brinzolamide-brimonidine (SIMBRINZA) 1-0.2 % drps Apply 1 Drop to eye two (2) times a day. patient reports use of eye drops in both eyes No current facility-administered medications on file prior to visit. Physical Exam  
Constitutional: She appears well-developed and well-nourished. No distress. /71 (BP 1 Location: Right arm, BP Patient Position: Sitting)   Pulse 95   Temp 97.3 °F (36.3 °C) (Oral)   Resp 20   Ht 5' 2\" (1.575 m)   Wt 159 lb 3.2 oz (72.2 kg)   SpO2 100%   BMI 29.12 kg/m² Eyes: EOM are normal. Right eye exhibits no discharge. Left eye exhibits no discharge. No scleral icterus. Neck: Neck supple. Cardiovascular: Normal rate, regular rhythm and normal heart sounds. Exam reveals no gallop and no friction rub. No murmur heard. Pulmonary/Chest: Effort normal and breath sounds normal. No respiratory distress. She has no wheezes. She has no rales. Musculoskeletal: She exhibits no edema or tenderness. Lymphadenopathy:  
  She has no cervical adenopathy. Neurological: She is alert. She exhibits normal muscle tone. Skin: Skin is warm and dry. Psychiatric: She has a normal mood and affect. Lab Results Component Value Date/Time Sodium 142 11/09/2017 11:19 AM  
 Potassium 5.0 11/09/2017 11:19 AM  
 Chloride 107 11/09/2017 11:19 AM  
 CO2 26 11/09/2017 11:19 AM  
 Anion gap 9 11/09/2017 11:19 AM  
 Glucose 102 (H) 11/09/2017 11:19 AM  
 BUN 29 (H) 11/09/2017 11:19 AM  
 Creatinine 1.85 (H) 11/09/2017 11:19 AM  
 BUN/Creatinine ratio 16 11/09/2017 11:19 AM  
 GFR est AA 31 (L) 11/09/2017 11:19 AM  
 GFR est non-AA 26 (L) 11/09/2017 11:19 AM  
 Calcium 9.1 11/09/2017 11:19 AM  
 Bilirubin, total 0.2 11/09/2017 11:19 AM  
 AST (SGOT) 48 (H) 11/09/2017 11:19 AM  
 Alk. phosphatase 81 11/09/2017 11:19 AM  
 Protein, total 7.4 11/09/2017 11:19 AM  
 Albumin 3.6 11/09/2017 11:19 AM  
 Globulin 3.8 11/09/2017 11:19 AM  
 A-G Ratio 0.9 11/09/2017 11:19 AM  
 ALT (SGPT) 63 (H) 11/09/2017 11:19 AM  
 
Lab Results Component Value Date/Time  Hemoglobin A1c 6.4 (H) 11/09/2017 11:19 AM  
 Hemoglobin A1c (POC) 5.4 10/11/2018 10:14 AM  
 
Lab Results Component Value Date/Time WBC 4.0 (L) 11/09/2017 11:19 AM  
 WBC 6.4 07/02/2012 01:54 PM  
 HGB 11.5 (L) 11/09/2017 11:19 AM  
 HCT 35.8 11/09/2017 11:19 AM  
 PLATELET 351 (L) 44/42/1287 11:19 AM  
 .0 (H) 11/09/2017 11:19 AM  
 
 
ASSESSMENT and PLAN 
  ICD-10-CM ICD-9-CM 1. Type 2 diabetes mellitus without complication, with long-term current use of insulin (HCC) E11.9 250.00 HEMOGLOBIN A1C WITH EAG  
 E34.8 M68.50 METABOLIC PANEL, BASIC MICROALBUMIN, UR, RAND W/ MICROALB/CREAT RATIO 2. Mixed hyperlipidemia E78.2 272.2 LIPID PANEL 3. Tremor R25.1 781.0 TSH 3RD GENERATION  
   T4, FREE 4. Diarrhea, unspecified type R19.7 787.91 One glc reading 400, but typically < 200 on current regimen. Repeat labs today. Will continue with current medication for now. She will look into [facility] sensor/reader Reordered Lipitor 20 mg which has been her usual dose. Discussed tremor which is resting/intentional. Not impacting her function significantly so she wishes to hold on further medication or neurology evaluation. Can try adding metamucil for her diarrhea sx.

## 2019-01-17 NOTE — PROGRESS NOTES
ROOM # 17 Identified pt with two pt identifiers(name and ). Reviewed record in preparation for visit and have obtained necessary documentation. Chief Complaint Patient presents with  Diabetes Drew Maciel preferred language for health care discussion is english/other. Is the patient using any DME equipment during OV? YES Drew Maciel is due for: 
Health Maintenance Due Topic  DTaP/Tdap/Td series (1 - Tdap)  Shingrix Vaccine Age 50> (1 of 2)  Bone Densitometry (Dexa) Screening  MICROALBUMIN Q1   
 FOOT EXAM Q1   
 LIPID PANEL Q1 Health Maintenance reviewed and discussed per provider Please order/place referral if appropriate. Advance Directive: 1. Do you have an advance directive in place? Patient Reply: NO 
 
2. If not, would you like material regarding how to put one in place? NO Coordination of Care: 1. Have you been to the ER, urgent care clinic since your last visit? Hospitalized since your last visit? NO 
 
2. Have you seen or consulted any other health care providers outside of the [address] [place] [address] since your last visit? Include any pap smears or colon screening. YES Patient is accompanied by son I have received verbal consent from Drew Hurdcheryl to discuss any/all medical information while they are present in the room. Learning Assessment: 
Learning Assessment 2013 PRIMARY LEARNER Patient Patient Patient Patient HIGHEST LEVEL OF EDUCATION - PRIMARY LEARNER  2 YEARS OF COLLEGE 2 YEARS OF COLLEGE 2 YEARS OF COLLEGE -  
BARRIERS PRIMARY LEARNER - NONE - -  
CO-LEARNER CAREGIVER - - Yes -  
[address] - - > 4 [phone] [facility] - - ENGLISH -  
 NEED - No No -  
 LEARNER PREFERENCE PRIMARY READING DEMONSTRATION OTHER (COMMENT) DEMONSTRATION  
  [employer]IDE[employer] - DEMONSTRATION -  
LEARNER PREFERENCE CO-LEARNER - - DEMONSTRATION -  
LEARNING SPECIAL TOPICS - no no -  
ANSWERED BY patient patient patient patient RELATIONSHIP SELF SELF - SELF Depression Screening: PHQ over the last two weeks 1/17/2019 10/26/2018 10/11/2018 7/10/2018 6/19/2018 5/17/2018 5/22/2017 Little interest or pleasure in doing things Not at all Not at all Not at all Not at all Not at all Not at all Not at all Feeling down, depressed, irritable, or hopeless Not at all Not at all Not at all Not at all Not at all Not at all Not at all Total Score PHQ 2 0 0 0 0 0 0 0 Abuse Screening: 
Abuse Screening Questionnaire 5/22/2017 6/1/2015 Do you ever feel afraid of your partner? Ruth Sheridan Are you in a relationship with someone who physically or mentally threatens you? Ruth Sheridan Is it safe for you to go home? Thelma Zamarripa Fall Risk Fall Risk Assessment, last 12 mths 1/17/2019 10/26/2018 10/11/2018 7/10/2018 6/19/2018 5/17/2018 2/12/2018 Able to walk? Yes Yes Yes Yes Yes Yes Yes Fall in past 12 months?  No No No No No No No  
Fall with injury? - - - - - - -

## 2019-01-17 NOTE — PATIENT INSTRUCTIONS
Diarrhea: Care Instructions Your Care Instructions Diarrhea is loose, watery stools (bowel movements). The exact cause is often hard to find. Sometimes diarrhea is your body's way of getting rid of what caused an upset stomach. Viruses, food poisoning, and many medicines can cause diarrhea. Some people get diarrhea in response to emotional stress, anxiety, or certain foods. Almost everyone has diarrhea now and then. It usually isn't serious, and your stools will return to normal soon. The important thing to do is replace the fluids you have lost, so you can prevent dehydration. The doctor has checked you carefully, but problems can develop later. If you notice any problems or new symptoms, get medical treatment right away. Follow-up care is a key part of your treatment and safety. Be sure to make and go to all appointments, and call your doctor if you are having problems. It's also a good idea to know your test results and keep a list of the medicines you take. How can you care for yourself at home? · Watch for signs of dehydration, which means your body has lost too much water. Dehydration is a serious condition and should be treated right away. Signs of dehydration are: ? Increasing thirst and dry eyes and mouth. ? Feeling faint or lightheaded. ? Darker urine, and a smaller amount of urine than normal. 
· To prevent dehydration, drink plenty of fluids, enough so that your urine is light yellow or clear like water. Choose water and other caffeine-free clear liquids until you feel better. If you have kidney, heart, or liver disease and have to limit fluids, talk with your doctor before you increase the amount of fluids you drink. · Begin eating small amounts of mild foods the next day, if you feel like it. ? Try yogurt that has live cultures of Lactobacillus. (Check the label.) ? Avoid spicy foods, fruits, alcohol, and caffeine until 48 hours after all symptoms are gone. ? Avoid chewing gum that contains sorbitol. ? Avoid dairy products (except for yogurt with Lactobacillus) while you have diarrhea and for 3 days after symptoms are gone. · The doctor may recommend that you take over-the-counter medicine, such as loperamide (Imodium), if you still have diarrhea after 6 hours. Read and follow all instructions on the label. Do not use this medicine if you have bloody diarrhea, a high fever, or other signs of serious illness. Call your doctor if you think you are having a problem with your medicine. When should you call for help? Call 911 anytime you think you may need emergency care. For example, call if: 
  · You passed out (lost consciousness).  
  · Your stools are maroon or very bloody.  
 Call your doctor now or seek immediate medical care if: 
  · You are dizzy or lightheaded, or you feel like you may faint.  
  · Your stools are black and look like tar, or they have streaks of blood.  
  · You have new or worse belly pain.  
  · You have symptoms of dehydration, such as: 
? Dry eyes and a dry mouth. ? Passing only a little dark urine. ? Feeling thirstier than usual.  
  · You have a new or higher fever.  
 Watch closely for changes in your health, and be sure to contact your doctor if: 
  · Your diarrhea is getting worse.  
  · You see pus in the diarrhea.  
  · You are not getting better after 2 days (48 hours). Where can you learn more? Go to http://quirino-zeinab.info/. Enter U559 in the search box to learn more about \"Diarrhea: Care Instructions. \" Current as of: November 20, 2017 Content Version: 11.8 © 7207-9324 [employer]. Care instructions adapted under license by [employer] (which disclaims liability or warranty for this information).  If you have questions about a medical condition or this instruction, always ask your healthcare professional. [employer] disclaims any warranty or liability for your use of this information. Benign Essential Tremor: Care Instructions Your Care Instructions Benign essential tremor is a medical term for shaking that you can't control. Your hand or fingers may shake when you lift a cup or point at something. Or your voice may shake when you speak. This type of tremor is not harmful. It is not caused by a stroke or Parkinson's disease. Some things can affect how much you shake. For example, drinking or eating something with caffeine may make tremors worse for a while. Some medicines also can increase tremors. These include antidepressants and too much thyroid replacement. Talk to your doctor if you think one of your medicines makes your tremors worse. If you are self-conscious about your tremors, there are some things you can do to reduce them or make them less noticeable. This includes taking medicine. Follow-up care is a key part of your treatment and safety. Be sure to make and go to all appointments, and call your doctor if you are having problems. It's also a good idea to know your test results and keep a list of the medicines you take. How can you care for yourself at home? · Take your medicines exactly as prescribed. Call your doctor if you think you are having a problem with your medicine. Some medicines that help control tremors have to be taken every day, even if you are not having tremors. You will get more details on the specific medicines your doctor prescribes. · Get plenty of rest. 
· Eat a balanced, healthy diet. · Try to reduce stress. Regular exercise and massages may help. · Limit alcohol. Heavy drinking can make your tremors worse. · Avoid drinks or foods with caffeine if they make your tremors worse. These include tea, cola, coffee, and chocolate. · Wear a heavy bracelet or watch. This adds a little weight to your hand. The extra weight may reduce tremors. · Drink from cups or glasses that are only half full. You may also want to try drinking with a straw. When should you call for help? Watch closely for changes in your health, and be sure to contact your doctor if: 
  · You notice your tremors are getting worse.  
  · You can't do your everyday activities because of your tremors.  
  · You are sad and embarrassed about your shaking. Where can you learn more? Go to http://quirino-zeinab.info/. Enter B746 in the search box to learn more about \"Benign Essential Tremor: Care Instructions. \" Current as of: June 4, 2018 Content Version: 11.8 © 3990-9175 [employer]. Care instructions adapted under license by [employer] (which disclaims liability or warranty for this information). If you have questions about a medical condition or this instruction, always ask your healthcare professional. [address] any warranty or liability for your use of this information.

## 2019-01-29 NOTE — TELEPHONE ENCOUNTER
Requested Prescriptions     Pending Prescriptions Disp Refills    glucose blood VI test strips (ONETOUCH ULTRA TEST) strip 200 Strip 5     Sig: Use as directed to test blood sugar TID    patient testing 4 times per day

## 2019-01-31 NOTE — TELEPHONE ENCOUNTER
Requested Prescriptions     Pending Prescriptions Disp Refills    glucose blood VI test strips (ONETOUCH ULTRA TEST) strip 400 Strip 3     Sig: Use as directed to test blood sugar qid       *Please resubmitted to the correct pharmacy

## 2019-02-06 ENCOUNTER — TELEPHONE (OUTPATIENT)
Dept: INTERNAL MEDICINE CLINIC | Age: 84
End: 2019-02-06

## 2019-02-06 NOTE — TELEPHONE ENCOUNTER
Patient's daughter-in-law, called to see status of paperwork from pharmacy for written order for test strips. Patient is out of testing strips and family would like this completed today. Family requests callback when completed.

## 2019-02-06 NOTE — TELEPHONE ENCOUNTER
Patient's daughter in-law is calling to find out what is going on with the refill/paperwork for the test strips. States the pharmacy is telling her they have sent medicare paperwork to be completed so they can get the strips covered but keep getting incomplete paperwork back.

## 2019-02-06 NOTE — TELEPHONE ENCOUNTER
I discussed with family. Please fax form again and call company to confirm that I have not missed anything they are requesting.  I have also reordered to [facility]]

## 2019-04-08 ENCOUNTER — OFFICE VISIT (OUTPATIENT)
Dept: INTERNAL MEDICINE CLINIC | Age: 84
End: 2019-04-08

## 2019-04-08 VITALS
TEMPERATURE: 97.5 F | DIASTOLIC BLOOD PRESSURE: 87 MMHG | SYSTOLIC BLOOD PRESSURE: 145 MMHG | BODY MASS INDEX: 27.79 KG/M2 | WEIGHT: 151 LBS | HEART RATE: 113 BPM | HEIGHT: 62 IN | RESPIRATION RATE: 16 BRPM | OXYGEN SATURATION: 99 %

## 2019-04-08 DIAGNOSIS — R30.0 DYSURIA: Primary | ICD-10-CM

## 2019-04-08 DIAGNOSIS — R63.4 WEIGHT LOSS: ICD-10-CM

## 2019-04-08 DIAGNOSIS — R15.9 INCONTINENCE OF FECES, UNSPECIFIED FECAL INCONTINENCE TYPE: ICD-10-CM

## 2019-04-08 DIAGNOSIS — I10 ESSENTIAL HYPERTENSION: ICD-10-CM

## 2019-04-08 RX ORDER — CIPROFLOXACIN 250 MG/1
250 TABLET, FILM COATED ORAL EVERY 12 HOURS
Qty: 10 TAB | Refills: 0 | Status: SHIPPED | OUTPATIENT
Start: 2019-04-08 | End: 2019-04-13

## 2019-04-08 NOTE — PROGRESS NOTES
ROOM # 16 Shanteladria Zuluaga presents today for Chief Complaint Patient presents with  Diabetes  Diarrhea  
  x 3 weeks  Dysuria Shantel Zuluaga preferred language for health care discussion is english/other. Is someone accompanying this pt? Yes son Is the patient using any DME equipment during OV? Yes rollator Depression Screening: 
3 most recent [facility] 36 Screens 4/8/2019 1/17/2019 10/26/2018 10/11/2018 7/10/2018 6/19/2018 5/17/2018 Little interest or pleasure in doing things Not at all Not at all Not at all Not at all Not at all Not at all Not at all Feeling down, depressed, irritable, or hopeless Not at all Not at all Not at all Not at all Not at all Not at all Not at all Total Score PHQ 2 0 0 0 0 0 0 0 Learning Assessment: 
Learning Assessment 6/1/2015 11/20/2013 8/19/2013 6/6/2013 PRIMARY LEARNER Patient Patient Patient Patient HIGHEST LEVEL OF EDUCATION - PRIMARY LEARNER  2 YEARS OF COLLEGE 2 YEARS OF COLLEGE 2 YEARS OF COLLEGE -  
BARRIERS PRIMARY LEARNER - NONE - -  
CO-LEARNER CAREGIVER - - Yes -  
[employer] - - > 4 YEARS [employer] ENGLISH  
PRIMARY LANGUAGE CO-LEARNER - - ENGLISH -  
 NEED - No No -  
LEARNER PREFERENCE PRIMARY READING DEMONSTRATION OTHER (COMMENT) DEMONSTRATION  
  VIDEOS - DEMONSTRATION -  
LEARNER PREFERENCE [address] [place] [address] Rd - no no -  
ANSWERED BY patient patient patient patient RELATIONSHIP SELF SELF - SELF Abuse Screening: 
Abuse Screening Questionnaire 5/22/2017 6/1/2015 Do you ever feel afraid of your partner? Melissa Kaiser Are you in a relationship with someone who physically or mentally threatens you? Melissa Kaiser Is it safe for you to go home? Ralph George Fall Risk Fall Risk Assessment, last 12 mths 4/8/2019 1/17/2019 10/26/2018 10/11/2018 7/10/2018 6/19/2018 5/17/2018 Able to walk? Yes Yes Yes Yes Yes Yes Yes Fall in past 12 months? No No No No No No No  
Fall with injury? - - - - - - - Health Maintenance reviewed and discussed per provider. Yes Gamaliel Love is due for Health Maintenance Due Topic Date Due  
 EYE EXAM RETINAL OR DILATED  03/09/1940  
 DTaP/Tdap/Td series (1 - Tdap) 03/09/1951  Shingrix Vaccine Age 50> (1 of 2) 03/09/1980  Bone Densitometry (Dexa) Screening  03/09/1995  MICROALBUMIN Q1  06/01/2018  
 FOOT EXAM Q1  11/09/2018 Please order/place referral if appropriate. Advance Directive: 1. Do you have an advance directive in place? Patient Reply: No 
 
2. If not, would you like material regarding how to put one in place? Patient Reply: No 
 
Coordination of Care: 1. Have you been to the ER, urgent care clinic since your last visit? Hospitalized since your last visit? No 
 
2. Have you seen or consulted any other health care providers outside of the [address] [place] [address] since your last visit? Include any pap smears or colon screening.  No

## 2019-04-08 NOTE — PROGRESS NOTES
HISTORY OF PRESENT ILLNESS Jayde Cevallos is a 80 y.o. female. Dysuria: occurring over past few weeks. H/o UTI, last treated July. No fevers, chills. Some back pain 
Continues to have frequent loose stools. Will occur for 3 days, resolve for a few days then recur. Seems worse. Has lost 16 lbs since OCT. Recent PET ordered by her oncologist. No evidence of metastatic dz. She does note appetite has been down which she attributes to her diarrhea, intermittent nausea. Son notes some cognitive decline. Recent MRI with chronic small vessel changes. HTN and DM have been stable. Review of Systems Constitutional: Positive for weight loss. Negative for chills and fever. HENT: Negative for congestion and ear pain. Eyes: Negative for blurred vision and pain. Respiratory: Negative for cough and shortness of breath. Cardiovascular: Negative for chest pain, palpitations and leg swelling. Gastrointestinal: Positive for diarrhea. Negative for blood in stool, melena, nausea and vomiting. Genitourinary: Negative for frequency and urgency. Musculoskeletal: Negative for joint pain and myalgias. Skin: Negative for itching and rash. Neurological: Negative for dizziness, tingling and headaches. Psychiatric/Behavioral: Negative for depression. The patient is not nervous/anxious. Past Medical History:  
Diagnosis Date  Cancer ([facility]  Diabetes ([facility]  GERD (gastroesophageal reflux disease)  Glaucoma  Hypercholesterolemia  Hypertension  [facility]   
 on tarciva Current Outpatient Medications on File Prior to Visit Medication Sig Dispense Refill  glucose blood VI test strips (ONETOUCH ULTRA TEST) strip Use as directed to test blood sugar qid 400 Strip 3  
 flash glucose scanning reader (FREESTYLE JUNIOR 14 DAY READER) misc Use as directed to test blood glucose tid 1 Each 0  
 flash glucose sensor (FREESTYLE JUNIOR 14 DAY SENSOR) kit Use as directed to test blood glucose TID 1 Kit 5  
 atorvastatin (LIPITOR) 20 mg tablet Take 1 Tab by mouth daily. 90 Tab 3  
 omeprazole (PRILOSEC) 20 mg capsule TAKE 1 CAPSULE DAILY 90 Cap 5  ferrous sulfate 325 mg (65 mg iron) tablet 325 mg.    
 insulin glargine (LANTUS SOLOSTAR U-100 INSULIN) 100 unit/mL (3 mL) inpn inject 15 units subcutaneously at bedtime 15 mL 5  
 Insulin Needles, Disposable, (BD ULTRA-FINE SHORT PEN NEEDLE) 31 gauge x 5/16\" ndle USE AS DIRECTED FOUR TIMES DAILY 400 Pen Needle 3  
 flash glucose scanning reader (FREESTYLE JUNIOR READER) misc Use as directed to test blood glucose TID 1 Each 0  
 flash glucose sensor (FREESTYLE JUNIOR SENSOR) kit Use as directed to test blood glucose TID 1 Kit 3  
 lancets (FREESTYLE LANCETS) 28 gauge misc Use as directed to test blood glucose  Lancet 5  furosemide (LASIX) 20 mg tablet Take 1 Tab by mouth daily. 90 Tab 3  
 insulin lispro (HUMALOG KWIKPEN INSULIN) 100 unit/mL kwikpen BELOW 160 TAKING 3 UNITS ABOVE 160 TAKING 1 UNITS ABOVE 200 TAKING 2 UNITS 5 Pen 3  Blood-Glucose Meter monitoring kit Use as directed to test blood sugar 1 Kit 0  
 FOLIC ACID/MULTIVIT-MIN/LUTEIN (CENTRUM SILVER PO) Take 1 Tab by mouth daily.  cyanocobalamin 1,000 mcg tablet Take 1,000 mcg by mouth daily.  CALCIUM CARBONATE/VITAMIN D3 (CALTRATE 600 + D PO) Take 600 mg by mouth daily.  Omega-3 Fatty Acids-Fish Oil (OMEGA 3 FISH OIL) 684-1,200 mg cpDR Take 1 Tab by mouth daily.  loperamide (IMODIUM) 1 mg/5 mL solution Take 1 mg by mouth three (3) times daily as needed for Diarrhea.  OXYGEN-AIR DELIVERY SYSTEMS 3 L by Nasal route continuous.  acetaminophen (TYLENOL) 500 mg tablet Take 500 mg by mouth every six (6) hours as needed for Pain or Fever.  Artifi. Tear, Hypromellose,-PF 0.3 % drop Apply 1 Drop to eye daily as needed.     
 RESTASIS 0.05 % ophthalmic emulsion instill 1 drop into both eyes twice a day  0  
  TAGRISSO 80 mg tablet Take 80 mg by mouth daily.  brinzolamide-brimonidine (SIMBRINZA) 1-0.2 % drps Apply 1 Drop to eye two (2) times a day. patient reports use of eye drops in both eyes No current facility-administered medications on file prior to visit. Physical Exam  
Constitutional: She appears well-developed and well-nourished. No distress. /87 (BP 1 Location: Right arm, BP Patient Position: Sitting)   Pulse (!) 113   Temp 97.5 °F (36.4 °C) (Oral)   Resp 16   Ht 5' 2\" (1.575 m)   Wt 151 lb (68.5 kg)   SpO2 99%   BMI 27.62 kg/m² Eyes: EOM are normal. Right eye exhibits no discharge. Left eye exhibits no discharge. No scleral icterus. Neck: Neck supple. Cardiovascular: Normal rate, regular rhythm and normal heart sounds. Exam reveals no gallop and no friction rub. No murmur heard. Pulmonary/Chest: Effort normal and breath sounds normal. No respiratory distress. She has no wheezes. She has no rales. Abdominal: Soft. She exhibits no distension. There is no tenderness. Musculoskeletal: She exhibits no edema or tenderness. Lymphadenopathy:  
  She has no cervical adenopathy. Neurological: She is alert. She exhibits normal muscle tone. Skin: Skin is warm and dry. Psychiatric: She has a normal mood and affect. Lab Results Component Value Date/Time Sodium 141 01/17/2019 11:54 AM  
 Potassium 4.0 01/17/2019 11:54 AM  
 Chloride 111 (H) 01/17/2019 11:54 AM  
 CO2 27 01/17/2019 11:54 AM  
 Anion gap 3 01/17/2019 11:54 AM  
 Glucose 94 01/17/2019 11:54 AM  
 BUN 26 (H) 01/17/2019 11:54 AM  
 Creatinine 1.45 (H) 01/17/2019 11:54 AM  
 BUN/Creatinine ratio 18 01/17/2019 11:54 AM  
 GFR est AA 41 (L) 01/17/2019 11:54 AM  
 GFR est non-AA 34 (L) 01/17/2019 11:54 AM  
 Calcium 9.0 01/17/2019 11:54 AM  
 Bilirubin, total 0.2 11/09/2017 11:19 AM  
 AST (SGOT) 48 (H) 11/09/2017 11:19 AM  
 Alk.  phosphatase 81 11/09/2017 11:19 AM  
 Protein, total 7.4 11/09/2017 11:19 AM  
 Albumin 3.6 11/09/2017 11:19 AM  
 Globulin 3.8 11/09/2017 11:19 AM  
 A-G Ratio 0.9 11/09/2017 11:19 AM  
 ALT (SGPT) 63 (H) 11/09/2017 11:19 AM  
 
Lab Results Component Value Date/Time Hemoglobin A1c 6.3 (H) 01/17/2019 11:54 AM  
 Hemoglobin A1c (POC) 5.4 10/11/2018 10:14 AM  
 
ASSESSMENT and PLAN 
  ICD-10-CM ICD-9-CM 1. Dysuria R30.0 788.1 2. Incontinence of feces, unspecified fecal incontinence type R15.9 787.60 REFERRAL TO GASTROENTEROLOGY 3. Essential hypertension I10 401.9 4. Weight loss R63.4 783.21 REFERRAL TO GASTROENTEROLOGY Will treat empirically for UTI based on most recent UC results. Discussed adding daily metamucil, referral entered to GI for fecal incontinence Suspect weight loss impart due to decreased PO intake. Can increase glucerna after meals. Discussed adequate hydration, particularly with her frequent loose stools. BP stable at this time. Will monitor memory

## 2019-04-08 NOTE — PATIENT INSTRUCTIONS
Fecal Incontinence: Care Instructions Your Care Instructions Fecal incontinence is the loss of normal control of your bowels. You may not be able to reach the toilet in time for a bowel movement, or stool may leak from your anus. Fecal incontinence can be caused by constipation, diarrhea, or anxiety or other emotional stress. It can also result from nerve injury, muscle damage (especially from childbirth), lack of exercise, or poor diet. Treatment of fecal incontinence depends on what caused it and how bad it is. It may include changes to your diet, medicine, bowel training, or surgery. More than one treatment may be needed. Loss of bowel control can be hard to deal with. You may feel ashamed or embarrassed, and you may not want to leave the house because you fear that you might have an accident in public. But treatment can help you better control your bowels and manage your incontinence. Follow-up care is a key part of your treatment and safety. Be sure to make and go to all appointments, and call your doctor if you are having problems. It's also a good idea to know your test results and keep a list of the medicines you take. How can you care for yourself at home? · Keep a food diary of what you eat. This will help you learn which foods make your incontinence worse. · Eat small, frequent meals. Large meals may cause diarrhea. · Avoid constipation: 
? Include fruits, vegetables, beans, and whole grains in your diet each day. These foods are high in fiber. ? Drink plenty of fluids, enough so that your urine is light yellow or clear like water. If you have kidney, heart, or liver disease and have to limit fluids, talk with your doctor before you increase the amount of fluids you drink. ? Get some exercise every day. Build up slowly to 30 to 60 minutes a day on 5 or more days of the week.  
? Take a fiber supplement, such as Citrucel or Metamucil, every day if needed. Read and follow all instructions on the label. ? Schedule time each day for a bowel movement. Having a daily routine may help. Take your time and do not strain when having a bowel movement. · Take your medicines exactly as prescribed. Call your doctor if you think you are having a problem with your medicine. You will get more details on the specific medicines your doctor prescribes. · Do pelvic floor (Kegel) exercises, which tighten and strengthen the pelvic muscles. To do Kegel exercises: 
? Squeeze the same muscles you would use to stop your urine. Your belly and thighs should not move. ? Hold the squeeze for 3 seconds, and then relax for 3 seconds. ? Start with 3 seconds. Then add 1 second each week until you are able to squeeze for 10 seconds. ? Repeat the exercise 10 to 15 times a session. Do three or more sessions a day. When should you call for help? Call your doctor now or seek immediate medical care if: 
  · You have new or worse pain.  
  · You have new or worse bleeding from the rectum.  
 Watch closely for changes in your health, and be sure to contact your doctor if: 
  · You cannot pass stools or gas.  
  · You do not get better as expected. Where can you learn more? Go to http://quirino-zeinab.info/. Enter A035 in the search box to learn more about \"Fecal Incontinence: Care Instructions. \" Current as of: March 27, 2018 Content Version: 11.9 © 0897-2525 [employer]. Care instructions adapted under license by [employer] (which disclaims liability or warranty for this information). If you have questions about a medical condition or this instruction, always ask your healthcare professional. [address] any warranty or liability for your use of this information.

## 2019-04-15 ENCOUNTER — TELEPHONE (OUTPATIENT)
Dept: INTERNAL MEDICINE CLINIC | Age: 84
End: 2019-04-15

## 2019-04-15 NOTE — TELEPHONE ENCOUNTER
Patient daughter in law calling to report the UTI medication not working she requesting alterative medication

## 2019-04-16 RX ORDER — PEN NEEDLE, DIABETIC 30 GX3/16"
NEEDLE, DISPOSABLE MISCELLANEOUS
Qty: 400 PEN NEEDLE | Refills: 3 | Status: SHIPPED | OUTPATIENT
Start: 2019-04-16 | End: 2019-04-16 | Stop reason: SDUPTHER

## 2019-04-16 RX ORDER — PEN NEEDLE, DIABETIC 30 GX3/16"
NEEDLE, DISPOSABLE MISCELLANEOUS
Qty: 400 PEN NEEDLE | Refills: 3 | Status: SHIPPED | OUTPATIENT
Start: 2019-04-16 | End: 2019-08-30

## 2019-04-16 NOTE — TELEPHONE ENCOUNTER
Ms. Lasha Irwin called to apologize but she forgot to say she needed [facility] not the [facility].

## 2019-04-16 NOTE — TELEPHONE ENCOUNTER
Patient's daughter in [address] calling I for a refill. States the Rx is written for 2 times a day but it needs to be for 4 times a day.     Requested Prescriptions     Pending Prescriptions Disp Refills    Insulin Needles, Disposable, (BD ULTRA-FINE SHORT PEN NEEDLE) 31 gauge x 5/16\" ndle 400 Pen Needle 3     Sig: USE AS DIRECTED FOUR TIMES DAILY

## 2019-04-16 NOTE — TELEPHONE ENCOUNTER
Patient contacted at home number. 2 patient identifiers confirmed. Spoke with patient son and he reports his wife has already been notified and they are trying to collect a urine sample. No other questions or concerns at this time.

## 2019-04-17 ENCOUNTER — HOSPITAL ENCOUNTER (OUTPATIENT)
Dept: LAB | Age: 84
Discharge: HOME | End: 2019-04-17
Payer: MEDICARE

## 2019-04-17 DIAGNOSIS — Z79.4 TYPE 2 DIABETES MELLITUS WITHOUT COMPLICATION, WITH LONG-TERM CURRENT USE OF INSULIN (HCC): ICD-10-CM

## 2019-04-17 DIAGNOSIS — E11.9 TYPE 2 DIABETES MELLITUS WITHOUT COMPLICATION, WITH LONG-TERM CURRENT USE OF INSULIN (HCC): ICD-10-CM

## 2019-04-17 PROCEDURE — 82043 UR ALBUMIN QUANTITATIVE: CPT

## 2019-04-18 LAB
CREAT UR-MCNC: 154 MG/DL (ref 30–125)
MICROALBUMIN UR-MCNC: 95.5 MG/DL (ref 0–3)
MICROALBUMIN/CREAT UR-RTO: 620 MG/G (ref 0–30)

## 2019-04-22 ENCOUNTER — TELEPHONE (OUTPATIENT)
Dept: INTERNAL MEDICINE CLINIC | Age: 84
End: 2019-04-22

## 2019-04-22 DIAGNOSIS — N39.0 RECURRENT UTI: Primary | ICD-10-CM

## 2019-04-22 NOTE — TELEPHONE ENCOUNTER
Patient's son is calling to find out the results of the UA/Culture. Patient's daughter in-law dropped of urine last Thur (4/18), and they have not heard anything.

## 2019-04-25 NOTE — TELEPHONE ENCOUNTER
Attempted to contact patient at  number, no answer. Lvm for patient to return call to office at [phone]. Will continue to try to contact patient.

## 2019-04-26 NOTE — TELEPHONE ENCOUNTER
Spoke with patient daughter in law. 2 patient identifiers confirmed. Informed patient daughter in law that urinalysis was not complete for patient. Patient daughter asked that an order be entered to check patient for a UTI (she states this was discussed with MD at last office visit). Patient daughter in law will bring in sample on Monday 04/29/2019.

## 2019-04-29 ENCOUNTER — HOSPITAL ENCOUNTER (OUTPATIENT)
Dept: LAB | Age: 84
Discharge: HOME | End: 2019-04-29
Payer: MEDICARE

## 2019-04-29 DIAGNOSIS — N39.0 RECURRENT UTI: ICD-10-CM

## 2019-04-29 LAB
APPEARANCE UR: CLEAR
BACTERIA URNS QL MICRO: ABNORMAL /HPF
BILIRUB UR QL: NEGATIVE
COLOR UR: ABNORMAL
EPITH CASTS URNS QL MICRO: ABNORMAL /LPF (ref 0–5)
GLUCOSE UR STRIP.AUTO-MCNC: NEGATIVE MG/DL
HGB UR QL STRIP: ABNORMAL
KETONES UR QL STRIP.AUTO: NEGATIVE MG/DL
LEUKOCYTE ESTERASE UR QL STRIP.AUTO: ABNORMAL
NITRITE UR QL STRIP.AUTO: NEGATIVE
PH UR STRIP: 7.5 [PH] (ref 5–8)
PROT UR STRIP-MCNC: 100 MG/DL
RBC #/AREA URNS HPF: ABNORMAL /HPF (ref 0–5)
SP GR UR REFRACTOMETRY: 1.01 (ref 1–1.03)
UROBILINOGEN UR QL STRIP.AUTO: 0.2 EU/DL (ref 0.2–1)
WBC URNS QL MICRO: ABNORMAL /HPF (ref 0–4)

## 2019-04-29 PROCEDURE — 81001 URINALYSIS AUTO W/SCOPE: CPT

## 2019-04-29 PROCEDURE — 87086 URINE CULTURE/COLONY COUNT: CPT

## 2019-05-01 LAB
BACTERIA SPEC CULT: ABNORMAL
SERVICE CMNT-IMP: ABNORMAL

## 2019-05-02 ENCOUNTER — TELEPHONE (OUTPATIENT)
Dept: INTERNAL MEDICINE CLINIC | Age: 84
End: 2019-05-02

## 2019-05-02 RX ORDER — AMOXICILLIN AND CLAVULANATE POTASSIUM 500; 125 MG/1; MG/1
1 TABLET, FILM COATED ORAL 2 TIMES DAILY
Qty: 10 TAB | Refills: 0 | Status: SHIPPED | OUTPATIENT
Start: 2019-05-02 | End: 2019-05-02 | Stop reason: SDUPTHER

## 2019-05-02 RX ORDER — AMOXICILLIN AND CLAVULANATE POTASSIUM 500; 125 MG/1; MG/1
1 TABLET, FILM COATED ORAL 2 TIMES DAILY
Qty: 10 TAB | Refills: 0 | Status: SHIPPED | OUTPATIENT
Start: 2019-05-02 | End: 2019-05-07

## 2019-05-02 NOTE — TELEPHONE ENCOUNTER
Patient's daughter in law is calling to check the results of the UA & culture. States her current symptoms are frequent urination, burning, and blood in her urine.

## 2019-05-02 NOTE — PROGRESS NOTES
Please clarify what symptoms she is having. She has small amount of bacteria in her urine which is different from prior cultures.

## 2019-05-02 NOTE — TELEPHONE ENCOUNTER
Please let her know I sent augmentin to her pharmacy. We may need her seen by urology or infectious disease in the future due to her recurrent infections.

## 2019-05-04 NOTE — TELEPHONE ENCOUNTER
Patient daughter in law contacted at home number. 2 patient identifiers confirmed. Patient daughter in law informed of below. Patient verbalized understanding. No other questions at this time.

## 2019-05-04 NOTE — PROGRESS NOTES
Patient daughter in law contacted at home number. 2 patient identifiers confirmed. Patient daughter in law informed of below. Daughter in law states patient mainly has burning with urination and frequency. No other questions at this time.

## 2019-05-16 ENCOUNTER — TELEPHONE (OUTPATIENT)
Dept: INTERNAL MEDICINE CLINIC | Age: 84
End: 2019-05-16

## 2019-05-16 DIAGNOSIS — N39.0 URINARY TRACT INFECTION WITH HEMATURIA, SITE UNSPECIFIED: ICD-10-CM

## 2019-05-16 DIAGNOSIS — R31.9 URINARY TRACT INFECTION WITH HEMATURIA, SITE UNSPECIFIED: ICD-10-CM

## 2019-05-16 DIAGNOSIS — N39.0 RECURRENT UTI: Primary | ICD-10-CM

## 2019-05-16 NOTE — TELEPHONE ENCOUNTER
Received call from Vanessa Jewell (daughter n law). Vanessa Jewell states that her mother n law is still experiencing burning and would like for Dr. Cheri Bagley to put a referral in the Urology. I advised Vanessa Jewell that I would send her request to Dr. Cheri Bagley.     Vanessa Jewell had no further concerns

## 2019-06-04 NOTE — MR AVS SNAPSHOT
Visit Information Date & Time Provider Department Dept. Phone Encounter #  
 2/6/2017  9:30 AM Gale Mckeon, [address] [id number] Follow-up Instructions Return in about 3 months (around 5/6/2017), or if symptoms worsen or fail to improve. Your Appointments 3/1/2017 11:15 AM  
CONSULT with Wanda Burgess MD  
[facility] ([address]) Appt Note: Pt never f/u from surgery back in april 2016  
 [address], [address] [address] [facility] [address]  
  
   
 [address], [address] [place] [address] Upcoming Health Maintenance Date Due  
 FOOT EXAM Q1 3[birth year]9/1940 DTaP/Tdap/Td series (1 - Tdap) 3/9/1951 ZOSTER VACCINE AGE 60> 3/9/1990 OSTEOPOROSIS SCREENING (DEXA) 3/9/1995 Pneumococcal 65+ High/Highest Risk (2 of 2 - PCV13) 11/3/2013 MEDICARE YEARLY EXAM 1/8/2017 MICROALBUMIN Q1 4/28/2017 HEMOGLOBIN A1C Q6M 5/28/2017 LIPID PANEL Q1 11/28/2017 EYE EXAM RETINAL OR DILATED Q1 12/27/2017 GLAUCOMA SCREENING Q2Y 12/27/2018 Allergies as of 2/6/2017  Review Complete On: 2/6/2017 By: Gale Mckeon MD  
  
 Severity Noted Reaction Type Reactions Egg  02/20/2014    Other (comments) Per Dr. 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   
  
 Codes Comments Epistaxis, recurrent    -  Primary ICD-10-CM: R04.0 ICD-9-CM: 784.7 Lower abdominal pain     ICD-10-CM: R10.30 ICD-9-CM: 789.09 Edema, unspecified type     ICD-10-CM: R60.9 ICD-9-CM: 052. 3 Vitals BP Pulse Temp Resp Height(growth percentile) Weight(growth percentile)  146/70 (BP 1 Location: Left arm, BP Patient Position: Sitting) 98 97.4 °F (36.3 °C) (Oral) 16 5' 2\" (1.575 m) 190 lb 3.2 oz (86.3 kg) SpO2 BMI OB Status Smoking Status 99% 34.79 kg/m2 Hysterectomy Never Smoker Vitals History BMI and BSA Data Body Mass Index Body Surface Area 34.79 kg/m 2 1.94 m 2 Preferred Pharmacy Pharmacy Name Phone [address] [facility], [address] [place] [address] [address] [phone] Your Updated Medication List  
  
   
This list is accurate as of: 2/6/17 10:28 AM.  Always use your most recent med list.  
  
  
  
  
 ASPIR-81 PO Take 81 mg by mouth daily. BD INSULIN PEN NEEDLE UF SHORT 31 gauge x 5/16\" Ndle Generic drug:  Insulin Needles (Disposable) USE AS DIRECTED TWICE DAILY CALTRATE PLUS PO Take  by mouth. CENTRUM SILVER WOMEN PO Take  by mouth.  
  
 cyanocobalamin 500 mcg tablet Commonly known as:  VITAMIN B12 Take 1,000 mcg by mouth daily. FERROUS SULFATE  
by Does Not Apply route. glucose blood VI test strips strip Commonly known as:  ONETOUCH ULTRA TEST Use as directed to test blood sugar HumaLOG KwikPen 100 unit/mL kwikpen Generic drug:  insulin lispro INJECT 6 UNITS BEFORE DINNER INCREASE TO 8 UNITS IF SUGAR IS OVER 200  
  
 insulin glargine 100 unit/mL (3 mL) pen Commonly known as:  LANTUS SOLOSTAR Use as directed based on endocrinology recommendations  
  
 ipratropium 0.03 % nasal spray Commonly known as:  ATROVENT  
2 Sprays by Both Nostrils route three (3) times daily. OMEGA 3 FISH OIL PO Take 500 mg by mouth two (2) times a day. omeprazole 20 mg capsule Commonly known as:  PRILOSEC  
take 1 capsule by mouth once daily  
  
 prazosin 1 mg capsule Commonly known as:  MINIPRESS  
1tid  Indications: HYPERTENSION  
  
 RESTASIS OP Apply 1 Drop to eye two (2) times a day. SIMBRINZA 1-0.2 % Drps Generic drug:  brinzolamide-brimonidine Apply 1 Drop to eye two (2) times a day. patient reports use of eye drops in both eyes TAGRISSO 80 mg tablet Generic drug:  osimertinib Take 80 mg by mouth daily. TARCEVA 100 mg tablet Generic drug:  erlotinib Take 150 mg by mouth daily. Follow-up Instructions Return in about 3 months (around 5/6/2017), or if symptoms worsen or fail to improve. Patient Instructions Nosebleeds: Care Instructions Your Care Instructions Nosebleeds are common, especially if you have colds or allergies. Many things can cause a nosebleed. Some nosebleeds stop on their own with pressure. Others need packing. Some get cauterized (sealed). If you have gauze or other packing materials in your nose, you will need to follow up with your doctor to have the packing removed. You may need more treatment if you get nosebleeds a lot. The doctor has checked you carefully, but problems can develop later. If you notice any problems or new symptoms, get medical treatment right away. Follow-up care is a key part of your treatment and safety. Be sure to make and go to all appointments, and call your doctor if you are having problems. It's also a good idea to know your test results and keep a list of the medicines you take. How can you care for yourself at home? · If you get another nosebleed: ¨ Sit up and tilt your head slightly forward. This keeps blood from going down your throat. ¨ Use your thumb and index finger to pinch your nose shut for 10 minutes. Use a clock. Do not check to see if the bleeding has stopped before the 10 minutes are up. If the bleeding has not stopped, pinch your nose shut for another 10 minutes. ¨ When the bleeding has stopped, try not to pick, rub, or blow your nose for 12 hours. Avoiding these things helps keep your nose from bleeding again. · If your doctor prescribed antibiotics, take them as directed.  Do not stop taking them just because you feel better. You need to take the full course of antibiotics. To prevent nosebleeds · Do not blow your nose too hard. · Try not to lift or strain after a nosebleed. · Raise your head on a pillow while you sleep. · Put a thin layer of a saline- or water-based nasal gel, such as NasoGel, inside your nose. Put it on the septum, which divides your nostrils. This will prevent dryness that can cause nosebleeds. · Use a vaporizer or humidifier to add moisture to your bedroom. Follow the directions for cleaning the machine. · Do not use aspirin, ibuprofen (Advil, Motrin), or naproxen (Aleve) for 36 to 48 hours after a nosebleed unless your doctor tells you to. You can use acetaminophen (Tylenol) for pain relief. · Talk to your doctor about stopping any other medicines you are taking. Some medicines may make you more likely to get a nosebleed. · Do not use cold medicines or nasal sprays without first talking to your doctor. They can make your nose dry. When should you call for help? Call 911 anytime you think you may need emergency care. For example, call if: 
· You passed out (lost consciousness). Call your doctor now or seek immediate medical care if: 
· You get another nosebleed and your nose is still bleeding after you have applied pressure 3 times for 10 minutes each time (30 minutes total). · There is a lot of blood running down the back of your throat even after you pinch your nose and tilt your head forward. · You have a fever. · You have sinus pain. Watch closely for changes in your health, and be sure to contact your doctor if: 
· You get nosebleeds often, even if they stop. · You do not get better as expected. Where can you learn more? Go to http://quirino-zeinab.info/. Enter S156 in the search box to learn more about \"Nosebleeds: Care Instructions. \" Current as of: May 27, 2016 Content Version: 11.1 © 1241-0203 Healthwise, Incorporated. Care instructions adapted under license by [employer] (which disclaims liability or warranty for this information). If you have questions about a medical condition or this instruction, always ask your healthcare professional. [address] any warranty or liability for your use of this information. Introducing [facility]! Dear Honey Avina: 
Thank you for requesting a [employer] account. Our records indicate that you have previously registered for a [employer] account but its currently inactive. Please call our [employer] support line at [phone]. Additional Information If you have questions, please visit the Frequently Asked Questions section of the [employer] website at [email or web address] Remember, [employer] is NOT to be used for urgent needs. For medical emergencies, dial 911. Now available from your iPhone and Android! Please provide this summary of care documentation to your next provider. Your primary care clinician is listed as Daniel Villagomez. If you have any questions after today's visit, please call [phone]. [phone]

## 2019-06-07 RX ORDER — INSULIN GLARGINE 100 [IU]/ML
INJECTION, SOLUTION SUBCUTANEOUS
Qty: 15 ML | Refills: 5 | Status: SHIPPED | OUTPATIENT
Start: 2019-06-07

## 2019-06-07 RX ORDER — INSULIN LISPRO 100 [IU]/ML
INJECTION, SOLUTION INTRAVENOUS; SUBCUTANEOUS
Qty: 5 PEN | Refills: 3 | Status: SHIPPED | OUTPATIENT
Start: 2019-06-07 | End: 2019-06-12 | Stop reason: SDUPTHER

## 2019-06-12 ENCOUNTER — TELEPHONE (OUTPATIENT)
Dept: INTERNAL MEDICINE CLINIC | Age: 84
End: 2019-06-12

## 2019-06-12 RX ORDER — INSULIN LISPRO 100 [IU]/ML
INJECTION, SOLUTION INTRAVENOUS; SUBCUTANEOUS
Qty: 5 PEN | Refills: 3 | Status: SHIPPED | OUTPATIENT
Start: 2019-06-12

## 2019-06-12 RX ORDER — INSULIN LISPRO 100 [IU]/ML
INJECTION, SOLUTION INTRAVENOUS; SUBCUTANEOUS
Qty: 5 PEN | Refills: 3 | Status: SHIPPED | OUTPATIENT
Start: 2019-06-12 | End: 2019-06-12 | Stop reason: SDUPTHER

## 2019-06-12 NOTE — TELEPHONE ENCOUNTER
Please resend to the correct pharmacy       · insulin lispro (HUMALOG KWIKPEN INSULIN) 100 unit/mL kwikpen

## 2019-06-12 NOTE — TELEPHONE ENCOUNTER
Requested Prescriptions     Pending Prescriptions Disp Refills    insulin lispro (HUMALOG KWIKPEN INSULIN) 100 unit/mL kwikpen 5 Pen 3     Sig: BELOW 160 TAKING 3 UNITS ABOVE 160 TAKING 1 UNITS ABOVE 200 TAKING 2 UNITS

## 2019-06-17 ENCOUNTER — TELEPHONE (OUTPATIENT)
Dept: INTERNAL MEDICINE CLINIC | Age: 84
End: 2019-06-17

## 2019-06-17 NOTE — TELEPHONE ENCOUNTER
Rep from [facility] is calling in to verify the directions of the Humalog quick pens.     [phone]  Ref# [id number]

## 2019-06-19 PROBLEM — N39.0 URINARY TRACT INFECTION: Status: ACTIVE | Noted: 2017-03-30

## 2019-07-05 RX ORDER — FUROSEMIDE 20 MG/1
TABLET ORAL
Qty: 90 TAB | Refills: 3 | Status: SHIPPED | OUTPATIENT
Start: 2019-07-05

## 2019-07-22 ENCOUNTER — OFFICE VISIT (OUTPATIENT)
Dept: INTERNAL MEDICINE CLINIC | Age: 84
End: 2019-07-22

## 2019-07-22 VITALS
SYSTOLIC BLOOD PRESSURE: 119 MMHG | WEIGHT: 143 LBS | TEMPERATURE: 98.6 F | HEIGHT: 61 IN | RESPIRATION RATE: 20 BRPM | BODY MASS INDEX: 27 KG/M2 | OXYGEN SATURATION: 99 % | DIASTOLIC BLOOD PRESSURE: 70 MMHG | HEART RATE: 97 BPM

## 2019-07-22 DIAGNOSIS — N20.0 RENAL LITHIASIS: ICD-10-CM

## 2019-07-22 DIAGNOSIS — Z01.818 PRE-OP EXAM: Primary | ICD-10-CM

## 2019-07-22 DIAGNOSIS — E11.21 TYPE 2 DIABETES WITH NEPHROPATHY (HCC): ICD-10-CM

## 2019-07-22 DIAGNOSIS — Z98.890 HISTORY OF STENT INSERTION OF RENAL ARTERY: ICD-10-CM

## 2019-07-22 NOTE — PROGRESS NOTES
HISTORY OF PRESENT ILLNESS  Annette Melgar is a 80 y.o. female. Visit Vitals  /70   Pulse 97   Temp 98.6 °F (37 °C) (Oral)   Resp 20   Ht 5' 1\" (1.549 m)   Wt 143 lb (64.9 kg)   SpO2 99%   BMI 27.02 kg/m²           Pt has a h/o left ureteral stent. Done years ago. She also has renal lithiasis    She has been having recurrent UTIs so that urology wants to remove the stone and then stent (with possible replacement)    She has lung cancer in remission. Blood sugar is controlled          Past Medical History:  No date: Cancer (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 [address]  2016: Ureteral calculus  Past Surgical History:  No date: DELIVERY   X 3: HX BREAST BIOPSY; Right      Comment:  all benign  years ago: HX HYSTERECTOMY      Comment:  partial via abdmen  No date: HX OTHER SURGICAL      Comment:  ulcer on toe  No date: HX OTHER SURGICAL      Comment:  removal \"boil\" from chest  Current Outpatient Medications:  trimethoprim-sulfamethoxazole (BACTRIM DS, SEPTRA DS) 160-800 mg per tablet, Take 1 Tab by mouth two (2) times a day.   furosemide (LASIX) 20 mg tablet, TAKE 1 TABLET DAILY  insulin lispro (HUMALOG KWIKPEN INSULIN) 100 unit/mL kwikpen, BELOW 160 TAKING 3 UNITS ABOVE 160 TAKING 1 UNITS ABOVE 200 TAKING 2 UNITS TID PRN  insulin glargine (LANTUS SOLOSTAR U-100 INSULIN) 100 unit/mL (3 mL) inpn, INJECT 20 UNITS UNDER THE SKIN AT BEDTIME  psyllium 3.4 gram/5.8 gram powd, Use as directed once daily for loose stools   Insulin Needles, Disposable, (BD ULTRA-FINE SHORT PEN NEEDLE) 31 gauge x 5/16\" ndle, USE AS DIRECTED FOUR TIMES DAILY  glucose blood VI test strips (ONETOUCH ULTRA TEST) strip, Use as directed to test blood sugar qid  flash glucose scanning reader (FREESTYLE JUNIOR 14 DAY READER) misc, Use as directed to test blood glucose tid  flash glucose sensor (FREESTYLE JUNIOR 14 DAY SENSOR) kit, Use as directed to test blood glucose TID  atorvastatin (LIPITOR) 20 mg tablet, Take 1 Tab by mouth daily. omeprazole (PRILOSEC) 20 mg capsule, TAKE 1 CAPSULE DAILY  ferrous sulfate 325 mg (65 mg iron) tablet, 325 mg.  flash glucose scanning reader (FREESTYLE JUNIOR READER) [facility], Use as directed to test blood glucose TID  flash glucose sensor (FREESTYLE JUNIOR SENSOR) kit, Use as directed to test blood glucose TID  lancets (FREESTYLE LANCETS) 28 gauge misc, Use as directed to test blood glucose TID  Blood-Glucose Meter monitoring kit, Use as directed to test blood sugar  FOLIC ACID/MULTIVIT-MIN/LUTEIN (CENTRUM SILVER PO), Take 1 Tab by mouth daily. cyanocobalamin 1,000 mcg tablet, Take 1,000 mcg by mouth daily. CALCIUM CARBONATE/VITAMIN D3 (CALTRATE 600 + D PO), Take 600 mg by mouth daily. Omega-3 Fatty Acids-Fish Oil (OMEGA 3 FISH OIL) 684-1,200 mg cpDR, Take 1 Tab by mouth daily. loperamide (IMODIUM) 1 mg/5 mL solution, Take 1 mg by mouth three (3) times daily as needed for Diarrhea. OXYGEN-AIR DELIVERY SYSTEMS, 3 L by Nasal route continuous. acetaminophen (TYLENOL) 500 mg tablet, Take 500 mg by mouth every six (6) hours as needed for Pain or Fever. Artifi. Tear, Hypromellose,-PF 0.3 % drop, Apply 1 Drop to eye daily as needed. RESTASIS 0.05 % ophthalmic emulsion, instill 1 drop into both eyes twice a day  TAGRISSO 80 mg tablet, Take 80 mg by mouth daily. brinzolamide-brimonidine (SIMBRINZA) 1-0.2 % drps, Apply 1 Drop to eye two (2) times a day. patient reports use of eye drops in both eyes    No current facility-administered medications for this visit.          Allergies and Intolerances:    -- Egg -- Other (comments)    --  Per Dr. Kayla Rosario note on 11/20/13: She cannot take             the flu shot because of egg allergy    Family History:   Review of patient's family history indicates:  Problem: No Known Problems      Relation: Other          Age of Onset: (Not Specified)      Social History:   She  reports that she has never smoked. She has never used smokeless tobacco.  She  reports that she does not drink alcohol. Pre-op Exam   The history is provided by the patient. This is a new problem. Associated symptoms include shortness of breath (on walking. No change in the last several years). Pertinent negatives include no chest pain. Review of Systems   Constitutional: Negative for chills and fever. HENT: Negative for sore throat. Respiratory: Positive for shortness of breath (on walking. No change in the last several years). Negative for cough. Cardiovascular: Negative for chest pain and palpitations. Genitourinary:        Painful urination  No hematuria at present, but has been there before. Physical Exam   Constitutional: She is oriented to person, place, and time. She appears well-developed and well-nourished. No distress. HENT:   Mouth/Throat: Uvula is midline, oropharynx is clear and moist and mucous membranes are normal.   Bilateral cerumen impaction   Eyes: Conjunctivae are normal.   pupils non reactive (see meds)   Cardiovascular: Normal rate and regular rhythm. Pulmonary/Chest: Effort normal and breath sounds normal.   Musculoskeletal: She exhibits no edema. Neurological: She is alert and oriented to person, place, and time. Skin: Skin is warm and dry. She is not diaphoretic. Psychiatric: She has a normal mood and affect. Nursing note and vitals reviewed. ASSESSMENT and PLAN    ICD-10-CM ICD-9-CM    1. Pre-op exam Z01.818 V72.84    2. Renal lithiasis N20.0 592.0    3. History of stent insertion of renal artery Z98.890 V45.89    4.  Type 2 diabetes with nephropathy (HCC) E11.21 250.40      583.81        She actually does not look her age    DM is controlled    HTN controlled    No significant uncontrolled risk factors identified today  Lab reviewed    Moderate risk primarily due to age    Cleared for surgery    F/u here 2 weeks after surgery for recheck

## 2019-07-22 NOTE — PROGRESS NOTES
ROOM # 5    Shireen Ramirez presents today for   Chief Complaint   Patient presents with    Surgical Clearance       [address]  N preferred language for health care discussion is english/other. Is someone accompanying this pt? son    Is the patient using any DME equipment during [address]? rollator    Depression Screening:  3 most recent [facility] Screens 4/8/2019 1/17/2019 10/26/2018 10/11/2018 7/10/2018 6/19/2018 5/17/2018   Little interest or pleasure in doing things Not at all Not at all Not at all Not at all Not at all Not at all Not at all   Feeling down, depressed, irritable, or hopeless Not at all Not at all Not at all Not at all Not at all Not at all Not at all   Total Score PHQ 2 0 0 0 0 0 0 0       Learning Assessment:  Learning Assessment 6/1/2015 11/20/2013 8/19/2013 6/6/2013   PRIMARY LEARNER Patient Patient Patient Patient   HIGHEST LEVEL OF EDUCATION - PRIMARY LEARNER  [address] [address] [facility] CAREGIVER - - Yes -   [address] [facility] - - > 4 [address] [place] [address] Avenue - No No -   LEARNER [address] [employer] (COMMENT) DEMONSTRATION     VIDEOS - DEMONSTRATION -   LEARNER [address] [facility] [address] - no no -   ANSWERED BY patient patient patient patient   RELATIONSHIP SELF SELF - SELF       Abuse Screening:  Abuse Screening Questionnaire 5/22/2017 6/1/2015   Do you ever feel afraid of your partner? N N   Are you in a relationship with someone who physically or mentally threatens you? N N   Is it safe for you to go home? Y Y       Fall Risk  Fall Risk Assessment, last 12 mths 4/8/2019 1/17/2019 10/26/2018 10/11/2018 7/10/2018 6/19/2018 5/17/2018   Able to walk? Yes Yes Yes Yes Yes Yes Yes   Fall in past 12 months? No No No No No No No   Fall with injury? - - - - - - -       Health Maintenance reviewed and discussed per provider. Yes    Lalita Christie is due for   Health Maintenance Due   Topic Date Due    EYE EXAM RETINAL OR DILATED  03[birth year]09/1940    DTaP/Tdap/Td series (1 - Tdap) 03/09/1951    Shingrix Vaccine Age 50> (1 of 2) 03/09/1980    Bone Densitometry (Dexa) Screening  03/09/1995    FOOT EXAM Q1  11/09/2018    HEMOGLOBIN A1C Q6M  07/17/2019     Please order/place referral if appropriate. Advance Directive:  1. Do you have an advance directive in place? Patient Reply: no    2. If not, would you like material regarding how to put one in place? Patient Reply: no    Coordination of Care:  1. Have you been to the ER, urgent care clinic since your last visit? Hospitalized since your last visit? no    2. Have you seen or consulted any other health care providers outside of the [address] [place] [address] since your last visit? Include any pap smears or colon screening.  Urologist and eye doctor

## 2019-09-10 ENCOUNTER — TELEPHONE (OUTPATIENT)
Dept: INTERNAL MEDICINE CLINIC | Age: 84
End: 2019-09-10

## 2019-09-10 ENCOUNTER — HOSPITAL ENCOUNTER (OUTPATIENT)
Dept: LAB | Age: 84
Discharge: HOME | End: 2019-09-10
Payer: MEDICARE

## 2019-09-10 ENCOUNTER — OFFICE VISIT (OUTPATIENT)
Dept: INTERNAL MEDICINE CLINIC | Age: 84
End: 2019-09-10

## 2019-09-10 VITALS
WEIGHT: 146 LBS | OXYGEN SATURATION: 98 % | TEMPERATURE: 98.4 F | RESPIRATION RATE: 16 BRPM | DIASTOLIC BLOOD PRESSURE: 60 MMHG | SYSTOLIC BLOOD PRESSURE: 127 MMHG | HEART RATE: 80 BPM | HEIGHT: 61 IN | BODY MASS INDEX: 27.56 KG/M2

## 2019-09-10 DIAGNOSIS — R79.89 ABNORMAL CBC: ICD-10-CM

## 2019-09-10 DIAGNOSIS — E11.9 DIABETES MELLITUS, STABLE (HCC): Primary | ICD-10-CM

## 2019-09-10 DIAGNOSIS — I10 BENIGN HYPERTENSION WITHOUT CHF: ICD-10-CM

## 2019-09-10 DIAGNOSIS — E78.5 DYSLIPIDEMIA: ICD-10-CM

## 2019-09-10 DIAGNOSIS — E11.9 DIABETES MELLITUS, STABLE (HCC): ICD-10-CM

## 2019-09-10 LAB
ALBUMIN SERPL-MCNC: 3.4 G/DL (ref 3.4–5)
ALBUMIN/GLOB SERPL: 1 {RATIO} (ref 0.8–1.7)
ALP SERPL-CCNC: 74 U/L (ref 45–117)
ALT SERPL-CCNC: 50 U/L (ref 13–56)
ANION GAP SERPL CALC-SCNC: 4 MMOL/L (ref 3–18)
AST SERPL-CCNC: 58 U/L (ref 10–38)
BASOPHILS # BLD: 0 K/UL (ref 0–0.1)
BASOPHILS NFR BLD: 0 % (ref 0–2)
BILIRUB SERPL-MCNC: 0.3 MG/DL (ref 0.2–1)
BUN SERPL-MCNC: 29 MG/DL (ref 7–18)
BUN/CREAT SERPL: 14 (ref 12–20)
CALCIUM SERPL-MCNC: 9.3 MG/DL (ref 8.5–10.1)
CHLORIDE SERPL-SCNC: 110 MMOL/L (ref 100–111)
CO2 SERPL-SCNC: 26 MMOL/L (ref 21–32)
CREAT SERPL-MCNC: 2.01 MG/DL (ref 0.6–1.3)
DIFFERENTIAL METHOD BLD: ABNORMAL
EOSINOPHIL # BLD: 0 K/UL (ref 0–0.4)
EOSINOPHIL NFR BLD: 2 % (ref 0–5)
ERYTHROCYTE [DISTWIDTH] IN BLOOD BY AUTOMATED COUNT: 17.1 % (ref 11.6–14.5)
EST. AVERAGE GLUCOSE BLD GHB EST-MCNC: 108 MG/DL
GLOBULIN SER CALC-MCNC: 3.5 G/DL (ref 2–4)
GLUCOSE SERPL-MCNC: 70 MG/DL (ref 74–99)
HBA1C MFR BLD: 5.4 % (ref 4.2–5.6)
HCT VFR BLD AUTO: 31.1 % (ref 35–45)
HGB BLD-MCNC: 9.7 G/DL (ref 12–16)
LYMPHOCYTES # BLD: 0.8 K/UL (ref 0.9–3.6)
LYMPHOCYTES NFR BLD: 37 % (ref 21–52)
MCH RBC QN AUTO: 31 PG (ref 24–34)
MCHC RBC AUTO-ENTMCNC: 31.2 G/DL (ref 31–37)
MCV RBC AUTO: 99.4 FL (ref 74–97)
MONOCYTES # BLD: 0.3 K/UL (ref 0.05–1.2)
MONOCYTES NFR BLD: 14 % (ref 3–10)
NEUTS SEG # BLD: 0.9 K/UL (ref 1.8–8)
NEUTS SEG NFR BLD: 47 % (ref 40–73)
PLATELET # BLD AUTO: 102 K/UL (ref 135–420)
PMV BLD AUTO: 11.4 FL (ref 9.2–11.8)
POTASSIUM SERPL-SCNC: 5.1 MMOL/L (ref 3.5–5.5)
PROT SERPL-MCNC: 6.9 G/DL (ref 6.4–8.2)
RBC # BLD AUTO: 3.13 M/UL (ref 4.2–5.3)
SODIUM SERPL-SCNC: 140 MMOL/L (ref 136–145)
TSH SERPL DL<=0.05 MIU/L-ACNC: 1.13 UIU/ML (ref 0.36–3.74)
WBC # BLD AUTO: 2 K/UL (ref 4.6–13.2)

## 2019-09-10 PROCEDURE — 84443 ASSAY THYROID STIM HORMONE: CPT

## 2019-09-10 PROCEDURE — 80061 LIPID PANEL: CPT

## 2019-09-10 PROCEDURE — 36415 COLL VENOUS BLD VENIPUNCTURE: CPT

## 2019-09-10 PROCEDURE — 85025 COMPLETE CBC W/AUTO DIFF WBC: CPT

## 2019-09-10 PROCEDURE — 80053 COMPREHEN METABOLIC PANEL: CPT

## 2019-09-10 PROCEDURE — 83036 HEMOGLOBIN GLYCOSYLATED A1C: CPT

## 2019-09-10 NOTE — PROGRESS NOTES
Chief Complaint   Patient presents with    Establish Care       HPI:     Loreta Garcia is a 80 y.o.  female with history of type 2 DM, hypertension and dyslipidemia   here for the above complaint. Type 2 DM: 106 this AM. She checks 4 times a day. Sugar range: 40's-200. On average . She has not had a lot of lows and it has been months. She gets symptomatic with diaphoresis and sweating when sugars are low. She denies any chest pain, abdominal pain, headaches, dizziness. She has some shortness of breath. She is accompanied by her son. Past Medical History:   Diagnosis Date    Cancer [facility])     Cervical spondylosis 3/26/2016    Diabetes (HCC)     GERD (gastroesophageal reflux disease)     Glaucoma     Hypercholesterolemia     Hypertension     Lung cancer ([facility]     on tarciva    Stroke (cerebrum) ([facility] 3/26/2016    Ureteral calculus 2016    UTI (urinary tract infection)      Past Surgical History:   Procedure Laterality Date    DELIVERY       HX BREAST BIOPSY Right X 3    all benign    HX HYSTERECTOMY  years ago    partial via abdmen    HX OTHER SURGICAL      ulcer on toe    HX OTHER SURGICAL      removal \"boil\" from chest    HX UROLOGICAL  2019    Cysto, Cystolitholapaxy, L RPG, L distal ureteroscopy and laser lithotripsy. [address]  Dr Nix Due     Current Outpatient Medications   Medication Sig    trimethoprim-sulfamethoxazole (BACTRIM DS) 160-800 mg per tablet Take 1 Tab by mouth two (2) times a day.  trimethoprim-sulfamethoxazole (BACTRIM DS, SEPTRA DS) 160-800 mg per tablet Take 1 Tab by mouth two (2) times a day.     furosemide (LASIX) 20 mg tablet TAKE 1 TABLET DAILY    insulin lispro (HUMALOG KWIKPEN INSULIN) 100 unit/mL kwikpen BELOW 160 TAKING 3 UNITS ABOVE 160 TAKING 1 UNITS ABOVE 200 TAKING 2 UNITS TID PRN    insulin glargine (LANTUS SOLOSTAR U-100 INSULIN) 100 unit/mL (3 mL) inpn INJECT 20 UNITS UNDER THE SKIN AT BEDTIME  psyllium 3.4 gram/5.8 gram powd Use as directed once daily for loose stools    atorvastatin (LIPITOR) 20 mg tablet Take 1 Tab by mouth daily.  omeprazole (PRILOSEC) 20 mg capsule TAKE 1 CAPSULE DAILY    ferrous sulfate 325 mg (65 mg iron) tablet 414 mg.    FOLIC ACID/MULTIVIT-MIN/LUTEIN (CENTRUM SILVER PO) Take 1 Tab by mouth daily.  cyanocobalamin 1,000 mcg tablet Take 1,000 mcg by mouth daily.  CALCIUM CARBONATE/VITAMIN D3 (CALTRATE 600 + D PO) Take 600 mg by mouth daily.  Omega-3 Fatty Acids-Fish Oil (OMEGA 3 FISH OIL) 684-1,200 mg cpDR Take 1 Tab by mouth daily.  loperamide (IMODIUM) 1 mg/5 mL solution Take 1 mg by mouth three (3) times daily as needed for Diarrhea.  OXYGEN-AIR DELIVERY SYSTEMS 3 L by Nasal route continuous.  acetaminophen (TYLENOL) 500 mg tablet Take 500 mg by mouth every six (6) hours as needed for Pain or Fever.  Artifi. Tear, Hypromellose,-PF 0.3 % drop Apply 1 Drop to eye daily as needed.  RESTASIS 0.05 % ophthalmic emulsion instill 1 drop into both eyes twice a day    TAGRISSO 80 mg tablet Take 80 mg by mouth daily.  brinzolamide-brimonidine (SIMBRINZA) 1-0.2 % drps Apply 1 Drop to eye two (2) times a day. patient reports use of eye drops in both eyes    doxycycline (ADOXA) 100 mg tablet Take 1 Tab by mouth two (2) times a day. No current facility-administered medications for this visit.       Health Maintenance   Topic Date Due    EYE EXAM RETINAL OR DILATED  03[birth year]/1940    DTaP/Tdap/Td series (1 - Tdap) 03/09/1951    Shingrix Vaccine Age 50> (1 of 2) 03/09/1980    Bone Densitometry (Dexa) Screening  03/09/1995    FOOT EXAM Q1  11/09/2018    HEMOGLOBIN A1C Q6M  07/17/2019    MEDICARE YEARLY EXAM  10/12/2019    LIPID PANEL Q1  01/17/2020    MICROALBUMIN Q1  04/17/2020    GLAUCOMA SCREENING Q2Y  06/28/2021    Pneumococcal 65+ years  Completed     Immunization History   Administered Date(s) Administered    Pneumococcal Conjugate (PCV-13) 10/11/2018    Pneumococcal Polysaccharide (PPSV-23) 11/03/2012     No LMP recorded. Patient has had a hysterectomy. Allergies and Intolerances: Allergies   Allergen Reactions    Egg Other (comments)     Per Dr. Esteban Cash note on 11/20/13: She cannot take the flu shot because of egg allergy         Family History:   Family History   Problem Relation Age of Onset    No Known Problems Other        Social History:   She  reports that she has never smoked. She has never used smokeless tobacco.  She  reports that she does not drink alcohol. OBJECTIVE:   Physical exam:   Visit Vitals  /60 (BP 1 Location: Right arm, BP Patient Position: Sitting)   Pulse 80   Temp 98.4 °F (36.9 °C) (Oral)   Resp 16   Ht 5' 1\" (1.549 m)   Wt 146 lb (66.2 kg)   SpO2 98%   BMI 27.59 kg/m²        Generally: Pleasant female in no acute distress, examined in wheelchair  Cardiac Exam: regular, rate, and rhythm. Normal S1 and S2. No murmurs, gallops, or rubs  Pulmonary exam: Clear to auscultation bilaterally  Abdominal exam: Positive bowel sounds in all four quadrants, soft, nondistended, nontender  Extremities: 2+ dorsalis pedis pulses bilaterally.  No pedal edema    bilaterally    LABS/RADIOLOGICAL TESTS:  Lab Results   Component Value Date/Time    WBC 2.9 (L) 08/19/2019 10:44 AM    HGB 8.6 (L) 08/19/2019 10:44 AM    HCT 26.7 (L) 08/19/2019 10:44 AM    PLATELET 907 (L) 03/08/8429 10:44 AM     Lab Results   Component Value Date/Time    Sodium 138 08/19/2019 10:44 AM    Potassium 4.9 08/19/2019 10:44 AM    Chloride 104 08/19/2019 10:44 AM    CO2 24 08/19/2019 10:44 AM    Glucose 112 (H) 08/19/2019 10:44 AM    BUN 29 (H) 08/19/2019 10:44 AM    Creatinine 1.9 (H) 08/19/2019 10:44 AM     Lab Results   Component Value Date/Time    Cholesterol, total 172 01/17/2019 11:54 AM    HDL Cholesterol 90 (H) 01/17/2019 11:54 AM    LDL, calculated 63.6 01/17/2019 11:54 AM    Triglyceride 92 01/17/2019 11:54 AM     No results found for: GPT    Previous labs    ASSESSMENT/PLAN:    1. Diabetes mellitus, stable ([facility]): We will see what the labs show. Continue diet, exercise, and humalog and lantus.   -     HEMOGLOBIN A1C WITH EAG; Future  -     TSH 3RD GENERATION; Future    2. Dyslipidemia:we will see what the labs show. Continue diet, exercise and fish oil  And lipitor.   -     METABOLIC PANEL, COMPREHENSIVE; Future  -     LIPID PANEL; Future    3. Benign hypertension without CHF: stable. Continue diet, exercise and lasix. 4. Abnormal CBC  -     CBC WITH AUTOMATED DIFF; Future    5. Patient verbalized understanding and agreement with the plan. 6. Patient was given an after-visit summary. 7.   Follow-up and Dispositions    · Return in about 3 months (around 12/10/2019) for f/u DM/HTN/lipids  or sooner if worsening symptoms.                Kassi Dickerson M.D.

## 2019-09-10 NOTE — PROGRESS NOTES
ROOM # 1    Shireen Hebert presents today for   Chief Complaint   Patient presents with   [address] preferred language for health care discussion is english/other. Is someone accompanying this pt? Yes son    Is the patient using any DME equipment during OV? Yes Wheelchair    Depression Screening:  3 most recent [facility] Screens 9/10/2019 4/8/2019 1/17/2019 10/26/2018 10/11/2018 7/10/2018 6/19/2018   Little interest or pleasure in doing things Not at all Not at all Not at all Not at all Not at all Not at all Not at all   Feeling down, depressed, irritable, or hopeless Not at all Not at all Not at all Not at all Not at all Not at all Not at all   Total Score PHQ 2 0 0 0 0 0 0 0       Learning Assessment:  Learning Assessment 6/1/2015 11/20/2013 8/19/2013 6/6/2013   PRIMARY LEARNER Patient Patient Patient Patient   HIGHEST LEVEL OF EDUCATION - PRIMARY LEARNER  [address] [id number] [facility] CAREGIVER - - Yes -   [address] [facility] - - > [address] [place] [address] - No No -   LEARNER [id number] [address] (COMMENT) DEMONSTRATION     VIDEOS - DEMONSTRATION -   LEARNER [id number] [facility] 18 - no no -   ANSWERED BY patient patient patient patient   RELATIONSHIP SELF SELF - SELF       Abuse Screening:  Abuse Screening Questionnaire 5/22/2017 6/1/2015   Do you ever feel afraid of your partner? N N   Are you in a relationship with someone who physically or mentally threatens you? N N   Is it safe for you to go home?  Y Y       Fall Risk  Fall Risk Assessment, last 12 mths 9/10/2019 9/10/2019 4/8/2019 1/17/2019 10/26/2018 10/11/2018 7/10/2018   Able to walk? Yes No Yes Yes Yes Yes Yes   Fall in past 12 months? Yes - No No No No No   Fall with injury? No - - - - - -   Number of falls in past 12 months 1 - - - - - -   Fall Risk Score 1 - - - - - -       Health Maintenance reviewed and discussed per provider. Yes    Bassam Cueto is due for   Health Maintenance Due   Topic Date Due    EYE EXAM RETINAL OR DILATED  03/09/1940    DTaP/Tdap/Td series (1 - Tdap) 03/09/1951    Shingrix Vaccine Age 50> (1 of 2) 03/09/1980    Bone Densitometry (Dexa) Screening  03/09/1995    FOOT EXAM Q1  11/09/2018    HEMOGLOBIN A1C Q6M  07/17/2019     Please order/place referral if appropriate. Advance Directive:  1. Do you have an advance directive in place? Patient Reply: No    2. If not, would you like material regarding how to put one in place? Patient Reply: No    Coordination of Care:  1. Have you been to the ER, urgent care clinic since your last visit? Hospitalized since your last visit? No    2. Have you seen or consulted any other health care providers outside of the [address] [place] [address] since your last visit? Include any pap smears or colon screening.  Yes [facility]

## 2019-09-10 NOTE — TELEPHONE ENCOUNTER
Mikaela Dee from [facility] called and stated that the patient fell down lat week and that an order is needed for PT or OT if possible

## 2019-09-11 LAB
CHOLEST SERPL-MCNC: 174 MG/DL
HDLC SERPL-MCNC: 100 MG/DL (ref 40–60)
HDLC SERPL: 1.7 {RATIO} (ref 0–5)
LDLC SERPL CALC-MCNC: 62.8 MG/DL (ref 0–100)
LIPID PROFILE,FLP: ABNORMAL
TRIGL SERPL-MCNC: 56 MG/DL (ref ?–150)
VLDLC SERPL CALC-MCNC: 11.2 MG/DL

## 2019-09-12 NOTE — TELEPHONE ENCOUNTER
Why does she need PT? Where is she having pain? Is this home PT or outpt PT? She nor her son had mentioned about the fall at the [facility].

## 2019-09-12 NOTE — TELEPHONE ENCOUNTER
Please advise if this is possible. Patient was seen in office to establish care 09/10/2019. Did not mention recent fall.

## 2019-09-13 NOTE — TELEPHONE ENCOUNTER
Return call made to pt, pt states that her son said that she need to wait until after surgery to have PT. I asked pt what kind of surgery is she getting pt stated they are removing stones from her kidneys.  I asked pt why didn't she mention fall during office visit and pt stated \" I did tell the nurse that I fell over a suitcase\"

## 2019-09-13 NOTE — TELEPHONE ENCOUNTER
It was never mentioned. If they want to wait till after surgery, then they need to notify the [facility] people, [facility].

## 2019-09-18 DIAGNOSIS — D72.819 LEUKOPENIA, UNSPECIFIED TYPE: Primary | ICD-10-CM

## 2019-09-18 NOTE — PROGRESS NOTES
Please let pt know that labs were normal except:    1) WBC low at 2.0. We will refer to hematology. 2) kidney function up. We will monitor. Keep hydrated with water. 3) AST up. Is he having ETOH, tylenol, herbals, IVDU, tattoos, RUQ pain, yellowing of eyes or skin?

## 2019-09-30 NOTE — PROGRESS NOTES
Patient contacted at home number. Spoke with patient son. 2 patient identifiers confirmed. Patient son informed of below. Patient son verbalized understanding. Patient son denies any use of ETOH, Tylenol, herbals, IVDU, tattoos, RUQ pain, yellowing of eyes or skin. No other questions at this time.

## 2022-07-27 NOTE — PROGRESS NOTES
EGD:  1. REFLUX ESOPHAGITIS / ESOPHAGEAL CANDIDA - photo 1  2. GASTRITIS (photo 2)  3. DUODENAL ANGIODYSPLASIA (photo 3)    COLON:  1. NO RECURRENT POLYPS    Plan:  1. Pantoprazole 40 mg daily - prescription sent to pharmcy  2. Nystatin suspension qid x 7 days - prescription sent to pharmacy    OK for discharge after dinner today.     HISTORY OF PRESENT ILLNESS  Nura Chang is a 80 y.o. female. HPI Comments: 79 yo female here for f/u of DM, HTN, HLD. Continues to be followed by oncology for lung CA which is stable on Tagrissa. Repeat imaging scheduled. Uses O2 at home. DM Has been doing better on monitoring. Typically < 200. No recent hypoglycemia. No longer seeing endo. HTN is well controlled No CP, SOB. HLD has been stable. Review of Systems   Constitutional: Negative for chills, fever and weight loss. HENT: Negative for congestion, ear discharge and ear pain. Eyes: Negative for blurred vision and pain. Respiratory: Negative for cough and shortness of breath. Cardiovascular: Negative for chest pain, palpitations and leg swelling. Gastrointestinal: Negative for nausea and vomiting. Musculoskeletal: Negative for joint pain and myalgias. Skin: Negative for itching and rash. Neurological: Positive for sensory change. Negative for dizziness and headaches. Psychiatric/Behavioral: Negative for depression. The patient is not nervous/anxious. Past Medical History:   Diagnosis Date    [facility]     [facility]     GERD (gastroesophageal reflux disease)     Glaucoma     Hypercholesterolemia     Hypertension     [facility]     on tarciva     Current Outpatient Prescriptions on File Prior to Visit   Medication Sig Dispense Refill    omeprazole (PRILOSEC) 20 mg capsule take 1 capsule by mouth once daily 90 Cap 5    atorvastatin (LIPITOR) 40 mg tablet take 1 tablet by mouth once daily 60 Tab 5    furosemide (LASIX) 20 mg tablet take 1 tablet by mouth once daily for edema 30 Tab 1    FOLIC ACID/MULTIVIT-MIN/LUTEIN (CENTRUM SILVER PO) Take 1 Tab by mouth daily.  cyanocobalamin 1,000 mcg tablet Take 1,000 mcg by mouth daily.  CALCIUM CARBONATE/VITAMIN D3 (CALTRATE 600 + D PO) Take 600 mg by mouth daily.       Omega-3 Fatty Acids-Fish Oil (OMEGA 3 FISH OIL) 684-1,200 mg cpDR Take 1 Tab by mouth daily.  loperamide (IMODIUM) 1 mg/5 mL solution Take 1 mg by mouth three (3) times daily as needed for Diarrhea.  OXYGEN-AIR DELIVERY SYSTEMS 3 L by Nasal route continuous.  acetaminophen (TYLENOL) 500 mg tablet Take 500 mg by mouth every six (6) hours as needed for Pain or Fever.  Artifi. Tear, Hypromellose,-PF 0.3 % drop Apply 1 Drop to eye daily as needed.  RESTASIS 0.05 % ophthalmic emulsion instill 1 drop into both eyes twice a day  0    BD INSULIN PEN NEEDLE UF SHORT 31 gauge x 5/16\" ndle USE AS DIRECTED TWICE DAILY 200 Pen Needle 5    TAGRISSO 80 mg tablet Take 80 mg by mouth daily.  insulin glargine (LANTUS SOLOSTAR) 100 unit/mL (3 mL) pen Use as directed based on endocrinology recommendations (Patient taking differently: 20 Units by SubCUTAneous route nightly. SLIDING SCALE   0-120=0  121-160= 10 UNITS  161-249= 22 UNITS  OVER 250= 26 UNITS) 1 Each 5    glucose blood VI test strips (ONETOUCH ULTRA TEST) strip Use as directed to test blood sugar 100 Strip 5    brinzolamide-brimonidine (SIMBRINZA) 1-0.2 % drps Apply 1 Drop to eye two (2) times a day. patient reports use of eye drops in both eyes      HUMALOG KWIKPEN 100 unit/mL kwikpen INJECT 6 UNITS BEFORE DINNER INCREASE TO 8 UNITS IF SUGAR IS OVER 200 (Patient taking differently: BELOW 160 TAKING 3 UNITS ABOVE 160 TAKING 1 UNITS ABOVE 200 TAKING 2 UNITS) 15 mL 11    atorvastatin (LIPITOR) 20 mg tablet Take 20 mg by mouth daily. No current facility-administered medications on file prior to visit. Social History   Substance Use Topics    Smoking status: Never Smoker    Smokeless tobacco: Never Used    Alcohol use No     Physical Exam   Constitutional: She appears well-developed and well-nourished. No distress.    /65 (BP 1 Location: Left arm, BP Patient Position: Sitting)  Pulse 86  Temp 97.5 °F (36.4 °C) (Oral)   Resp 14  Ht 5' 2\" (1.575 m)  Wt 167 lb (75.8 kg)  SpO2 97%  BMI 30.54 kg/m2     HENT:   + cerumen debris B   Eyes: EOM are normal. Right eye exhibits no discharge. Left eye exhibits no discharge. No scleral icterus. Neck: Neck supple. Cardiovascular: Normal rate, regular rhythm and normal heart sounds. Exam reveals no gallop and no friction rub. No murmur heard. Pulmonary/Chest: Effort normal and breath sounds normal. No respiratory distress. She has no wheezes. She has no rales. Musculoskeletal: She exhibits no edema or tenderness. Lymphadenopathy:     She has no cervical adenopathy. Neurological: She is alert. She exhibits normal muscle tone. Skin: Skin is warm and dry. Psychiatric: She has a normal mood and affect. Her behavior is normal.   Diabetic Foot exam: 2+ dorsalis pedis pulses bilaterally. Positive monofilament in all toes R foot, bottoms of both feet. Decreased sensation L first 3 toes. . Negative for lesions, signs of infection, or foot abnormalities. Lab Results   Component Value Date/Time    Hemoglobin A1c 6.3 05/22/2017 10:31 AM    Hemoglobin A1c (POC) 6.7 01/08/2016 11:30 AM     Lab Results   Component Value Date/Time    Cholesterol, total 146 06/01/2015 12:10 PM    HDL Cholesterol 77 06/01/2015 12:10 PM    LDL, calculated 53 06/01/2015 12:10 PM    VLDL, calculated 16 06/01/2015 12:10 PM    Triglyceride 80 06/01/2015 12:10 PM     Lab Results   Component Value Date/Time    Sodium 141 05/22/2017 10:31 AM    Potassium 4.8 05/22/2017 10:31 AM    Chloride 104 05/22/2017 10:31 AM    CO2 28 05/22/2017 10:31 AM    Anion gap 9 05/22/2017 10:31 AM    Glucose 159 05/22/2017 10:31 AM    BUN 29 05/22/2017 10:31 AM    Creatinine 1.90 05/22/2017 10:31 AM    BUN/Creatinine ratio 15 05/22/2017 10:31 AM    GFR est AA 30 05/22/2017 10:31 AM    GFR est non-AA 25 05/22/2017 10:31 AM    Calcium 9.7 05/22/2017 10:31 AM    Bilirubin, total 0.3 09/08/2014 10:29 AM    AST (SGOT) 30 09/08/2014 10:29 AM    Alk.  phosphatase 83 09/08/2014 10:29 AM    Protein, total 6.3 09/08/2014 10:29 AM    Albumin 3.5 09/08/2014 10:29 AM    A-G Ratio 1.3 09/08/2014 10:29 AM    ALT (SGPT) 24 09/08/2014 10:29 AM     ASSESSMENT and PLAN    ICD-10-CM ICD-9-CM    1. Diabetic polyneuropathy associated with diabetes mellitus due to underlying condition ([facility]) E08.42 249.60 HM DIABETES FOOT EXAM     357.2 HEMOGLOBIN A1C W/O EAG   2. Essential hypertension Y02 185.2 METABOLIC PANEL, COMPREHENSIVE      CBC W/O DIFF   3. Pure hypercholesterolemia E78.00 272.0 LIPID PANEL   4. Cerumen debris on tympanic membrane of both ears H61.23 380.4      Repeat labs today. Will continue with current medication for now. Cerumen irrigated. Test strips, lancets refilled.

## 2022-12-24 NOTE — PATIENT INSTRUCTIONS
Painful Urination (Dysuria): Care Instructions Your Care Instructions Burning pain with urination (dysuria) is a common symptom of a urinary tract infection or other urinary problems. The bladder may become inflamed. This can cause pain when the bladder fills and empties. You may also feel pain if the tube that carries urine from the bladder to the outside of the body (urethra) gets irritated or infected. Sexually transmitted infections (STIs) also may cause pain when you urinate. Sometimes the pain can be caused by things other than an infection. The urethra can be irritated by soaps, perfumes, or foreign objects in the urethra. Kidney stones can cause pain when they pass through the urethra. The cause may be hard to find. You may need tests. Treatment for painful urination depends on the cause. Follow-up care is a key part of your treatment and safety. Be sure to make and go to all appointments, and call your doctor if you are having problems. It's also a good idea to know your test results and keep a list of the medicines you take. How can you care for yourself at home? · Drink extra water for the next day or two. This will help make the urine less concentrated. (If you have kidney, heart, or liver disease and have to limit fluids, talk with your doctor before you increase the amount of fluids you drink.) · Avoid drinks that are carbonated or have caffeine. They can irritate the bladder. · Urinate often. Try to empty your bladder each time. For women: · Urinate right after you have sex. · After going to the bathroom, wipe from front to back. · Avoid douches, bubble baths, and feminine hygiene sprays. And avoid other feminine hygiene products that have deodorants. When should you call for help? Call your doctor now or seek immediate medical care if: 
? · You have new symptoms, such as fever, nausea, or vomiting. ? · You have new or worse symptoms of a urinary problem. For example: ¨ You have blood or pus in your urine. ¨ You have chills or body aches. ¨ It hurts worse to urinate. ¨ You have groin or belly pain. ¨ You have pain in your back just below your rib cage (the flank area). ? Watch closely for changes in your health, and be sure to contact your doctor if you have any problems. Where can you learn more? Go to http://quirino-zeinab.info/. Enter C725 in the search box to learn more about \"Painful Urination (Dysuria): Care Instructions. \" Current as of: May 12, 2017 Content Version: 11.4 © 5088-0740 [employer]. Care instructions adapted under license by [employer] (which disclaims liability or warranty for this information). If you have questions about a medical condition or this instruction, always ask your healthcare professional. [address] any warranty or liability for your use of this information. 24-Dec-2022

## 2023-07-15 NOTE — TELEPHONE ENCOUNTER
Problem: Pressure Injury, Risk for  Goal: # Skin remains intact  Outcome: Outcome Met, Continue evaluating goal progress toward completion     Problem: VTE, Risk for  Goal: # No s/s of VTE  Outcome: Outcome Met, Continue evaluating goal progress toward completion     Problem: At Risk for Falls  Goal: # Patient does not fall  Outcome: Outcome Met, Continue evaluating goal progress toward completion  Patient remains free from falls. Has call light within reach, calls appropriately. Uses proper footwear, appropriate mobility device.     Referral entered